# Patient Record
Sex: FEMALE | Race: WHITE | Employment: OTHER | URBAN - NONMETROPOLITAN AREA
[De-identification: names, ages, dates, MRNs, and addresses within clinical notes are randomized per-mention and may not be internally consistent; named-entity substitution may affect disease eponyms.]

---

## 2017-01-25 ENCOUNTER — COMMUNICATION - GICH (OUTPATIENT)
Dept: FAMILY MEDICINE | Facility: OTHER | Age: 82
End: 2017-01-25

## 2017-01-25 DIAGNOSIS — M19.90 OSTEOARTHRITIS: ICD-10-CM

## 2017-02-23 ENCOUNTER — OFFICE VISIT - GICH (OUTPATIENT)
Dept: FAMILY MEDICINE | Facility: OTHER | Age: 82
End: 2017-02-23

## 2017-02-23 ENCOUNTER — HISTORY (OUTPATIENT)
Dept: FAMILY MEDICINE | Facility: OTHER | Age: 82
End: 2017-02-23

## 2017-02-23 DIAGNOSIS — R05.9 COUGH: ICD-10-CM

## 2017-02-23 DIAGNOSIS — I10 ESSENTIAL (PRIMARY) HYPERTENSION: ICD-10-CM

## 2017-02-23 DIAGNOSIS — J06.9 ACUTE UPPER RESPIRATORY INFECTION: ICD-10-CM

## 2017-02-23 DIAGNOSIS — Z00.00 ENCOUNTER FOR GENERAL ADULT MEDICAL EXAMINATION WITHOUT ABNORMAL FINDINGS: ICD-10-CM

## 2017-02-23 DIAGNOSIS — R73.9 HYPERGLYCEMIA: ICD-10-CM

## 2017-02-23 DIAGNOSIS — M62.81 MUSCLE WEAKNESS (GENERALIZED): ICD-10-CM

## 2017-02-23 LAB
A/G RATIO - HISTORICAL: 1.1 (ref 1–2)
ABSOLUTE BASOPHILS - HISTORICAL: 0.1 THOU/CU MM
ABSOLUTE EOSINOPHILS - HISTORICAL: 0.3 THOU/CU MM
ABSOLUTE LYMPHOCYTES - HISTORICAL: 1.6 THOU/CU MM (ref 0.9–2.9)
ABSOLUTE MONOCYTES - HISTORICAL: 0.7 THOU/CU MM
ABSOLUTE NEUTROPHILS - HISTORICAL: 5.7 THOU/CU MM (ref 1.7–7)
ALBUMIN SERPL-MCNC: 3.8 G/DL (ref 3.5–5.7)
ALP SERPL-CCNC: 54 IU/L (ref 34–104)
ALT (SGPT) - HISTORICAL: 14 IU/L (ref 7–52)
ANION GAP - HISTORICAL: 8 (ref 5–18)
AST SERPL-CCNC: 16 IU/L (ref 13–39)
BASOPHILS # BLD AUTO: 1 %
BILIRUB SERPL-MCNC: 0.6 MG/DL (ref 0.3–1)
BUN SERPL-MCNC: 18 MG/DL (ref 7–25)
BUN/CREAT RATIO - HISTORICAL: 29
CALCIUM SERPL-MCNC: 10 MG/DL (ref 8.6–10.3)
CHLORIDE SERPLBLD-SCNC: 103 MMOL/L (ref 98–107)
CO2 SERPL-SCNC: 26 MMOL/L (ref 21–31)
CREAT SERPL-MCNC: 0.63 MG/DL (ref 0.7–1.3)
EOSINOPHIL NFR BLD AUTO: 3.6 %
ERYTHROCYTE [DISTWIDTH] IN BLOOD BY AUTOMATED COUNT: 14 % (ref 11.5–15.5)
GFR IF NOT AFRICAN AMERICAN - HISTORICAL: >60 ML/MIN/1.73M2
GLOBULIN - HISTORICAL: 3.5 G/DL (ref 2–3.7)
GLUCOSE SERPL-MCNC: 109 MG/DL (ref 70–105)
HCT VFR BLD AUTO: 45.1 % (ref 33–51)
HEMOGLOBIN: 14.4 G/DL (ref 12–16)
LYMPHOCYTES NFR BLD AUTO: 19.3 % (ref 20–44)
MCH RBC QN AUTO: 27.7 PG (ref 26–34)
MCHC RBC AUTO-ENTMCNC: 31.9 G/DL (ref 32–36)
MCV RBC AUTO: 87 FL (ref 80–100)
MONOCYTES NFR BLD AUTO: 8.8 %
NEUTROPHILS NFR BLD AUTO: 67.4 % (ref 42–72)
PLATELET # BLD AUTO: 308 THOU/CU MM (ref 140–440)
PMV BLD: 7.1 FL (ref 6.5–11)
POTASSIUM SERPL-SCNC: 4.3 MMOL/L (ref 3.5–5.1)
PROT SERPL-MCNC: 7.3 G/DL (ref 6.4–8.9)
RED BLOOD COUNT - HISTORICAL: 5.18 MIL/CU MM (ref 4–5.2)
SODIUM SERPL-SCNC: 137 MMOL/L (ref 133–143)
WHITE BLOOD COUNT - HISTORICAL: 8.4 THOU/CU MM (ref 4.5–11)

## 2017-03-06 ENCOUNTER — COMMUNICATION - GICH (OUTPATIENT)
Dept: FAMILY MEDICINE | Facility: OTHER | Age: 82
End: 2017-03-06

## 2017-03-06 DIAGNOSIS — I10 ESSENTIAL (PRIMARY) HYPERTENSION: ICD-10-CM

## 2017-03-30 ENCOUNTER — AMBULATORY - GICH (OUTPATIENT)
Dept: SCHEDULING | Facility: OTHER | Age: 82
End: 2017-03-30

## 2017-04-24 ENCOUNTER — COMMUNICATION - GICH (OUTPATIENT)
Dept: FAMILY MEDICINE | Facility: OTHER | Age: 82
End: 2017-04-24

## 2017-04-24 DIAGNOSIS — M19.90 OSTEOARTHRITIS: ICD-10-CM

## 2017-05-25 ENCOUNTER — OFFICE VISIT - GICH (OUTPATIENT)
Dept: FAMILY MEDICINE | Facility: OTHER | Age: 82
End: 2017-05-25

## 2017-05-25 ENCOUNTER — HISTORY (OUTPATIENT)
Dept: FAMILY MEDICINE | Facility: OTHER | Age: 82
End: 2017-05-25

## 2017-05-25 DIAGNOSIS — M79.601 PAIN OF RIGHT ARM: ICD-10-CM

## 2017-05-25 DIAGNOSIS — R53.83 OTHER FATIGUE: ICD-10-CM

## 2017-05-25 ASSESSMENT — PATIENT HEALTH QUESTIONNAIRE - PHQ9: SUM OF ALL RESPONSES TO PHQ QUESTIONS 1-9: 0

## 2017-06-27 ENCOUNTER — COMMUNICATION - GICH (OUTPATIENT)
Dept: FAMILY MEDICINE | Facility: OTHER | Age: 82
End: 2017-06-27

## 2017-06-27 DIAGNOSIS — I10 ESSENTIAL (PRIMARY) HYPERTENSION: ICD-10-CM

## 2017-06-28 ENCOUNTER — HISTORY (OUTPATIENT)
Dept: EMERGENCY MEDICINE | Facility: OTHER | Age: 82
End: 2017-06-28

## 2017-06-30 ENCOUNTER — HISTORY (OUTPATIENT)
Dept: MEDSURG UNIT | Facility: OTHER | Age: 82
End: 2017-06-30

## 2017-07-03 ENCOUNTER — HISTORY (OUTPATIENT)
Dept: MEDSURG UNIT | Facility: OTHER | Age: 82
End: 2017-07-03

## 2017-07-05 ENCOUNTER — AMBULATORY - GICH (OUTPATIENT)
Dept: SCHEDULING | Facility: OTHER | Age: 82
End: 2017-07-05

## 2017-07-06 ENCOUNTER — HISTORY (OUTPATIENT)
Dept: FAMILY MEDICINE | Facility: OTHER | Age: 82
End: 2017-07-06

## 2017-07-06 ENCOUNTER — OFFICE VISIT - GICH (OUTPATIENT)
Dept: FAMILY MEDICINE | Facility: OTHER | Age: 82
End: 2017-07-06

## 2017-07-06 ENCOUNTER — COMMUNICATION - GICH (OUTPATIENT)
Dept: FAMILY MEDICINE | Facility: OTHER | Age: 82
End: 2017-07-06

## 2017-07-06 DIAGNOSIS — R53.1 WEAKNESS: ICD-10-CM

## 2017-07-06 DIAGNOSIS — A41.51 SEPSIS DUE TO ESCHERICHIA COLI (E. COLI) (H): ICD-10-CM

## 2017-07-06 ASSESSMENT — PATIENT HEALTH QUESTIONNAIRE - PHQ9: SUM OF ALL RESPONSES TO PHQ QUESTIONS 1-9: 0

## 2017-07-07 ENCOUNTER — COMMUNICATION - GICH (OUTPATIENT)
Dept: FAMILY MEDICINE | Facility: OTHER | Age: 82
End: 2017-07-07

## 2017-07-19 ENCOUNTER — AMBULATORY - GICH (OUTPATIENT)
Dept: FAMILY MEDICINE | Facility: OTHER | Age: 82
End: 2017-07-19

## 2017-07-19 DIAGNOSIS — B96.20 UNSPECIFIED ESCHERICHIA COLI (E. COLI) AS THE CAUSE OF DISEASES CLASSIFIED ELSEWHERE: ICD-10-CM

## 2017-07-19 DIAGNOSIS — N39.0 URINARY TRACT INFECTION: ICD-10-CM

## 2017-07-27 ENCOUNTER — COMMUNICATION - GICH (OUTPATIENT)
Dept: FAMILY MEDICINE | Facility: OTHER | Age: 82
End: 2017-07-27

## 2017-09-01 ENCOUNTER — OFFICE VISIT - GICH (OUTPATIENT)
Dept: FAMILY MEDICINE | Facility: OTHER | Age: 82
End: 2017-09-01

## 2017-09-01 ENCOUNTER — HISTORY (OUTPATIENT)
Dept: FAMILY MEDICINE | Facility: OTHER | Age: 82
End: 2017-09-01

## 2017-09-01 DIAGNOSIS — N39.0 URINARY TRACT INFECTION: ICD-10-CM

## 2017-09-01 DIAGNOSIS — A41.51 SEPSIS DUE TO ESCHERICHIA COLI (E. COLI) (H): ICD-10-CM

## 2017-09-01 DIAGNOSIS — B96.20 UNSPECIFIED ESCHERICHIA COLI (E. COLI) AS THE CAUSE OF DISEASES CLASSIFIED ELSEWHERE: ICD-10-CM

## 2017-09-01 ASSESSMENT — PATIENT HEALTH QUESTIONNAIRE - PHQ9: SUM OF ALL RESPONSES TO PHQ QUESTIONS 1-9: 0

## 2017-09-26 ENCOUNTER — COMMUNICATION - GICH (OUTPATIENT)
Dept: FAMILY MEDICINE | Facility: OTHER | Age: 82
End: 2017-09-26

## 2017-09-26 DIAGNOSIS — I10 ESSENTIAL (PRIMARY) HYPERTENSION: ICD-10-CM

## 2017-10-13 ENCOUNTER — OFFICE VISIT - GICH (OUTPATIENT)
Dept: FAMILY MEDICINE | Facility: OTHER | Age: 82
End: 2017-10-13

## 2017-10-13 ENCOUNTER — COMMUNICATION - GICH (OUTPATIENT)
Dept: FAMILY MEDICINE | Facility: OTHER | Age: 82
End: 2017-10-13

## 2017-10-13 ENCOUNTER — HISTORY (OUTPATIENT)
Dept: FAMILY MEDICINE | Facility: OTHER | Age: 82
End: 2017-10-13

## 2017-10-13 DIAGNOSIS — N39.0 URINARY TRACT INFECTION: ICD-10-CM

## 2017-10-13 DIAGNOSIS — R10.2 PELVIC AND PERINEAL PAIN: ICD-10-CM

## 2017-10-13 DIAGNOSIS — B37.31 CANDIDIASIS OF VULVA AND VAGINA: ICD-10-CM

## 2017-10-13 LAB
BACTERIA URINE: ABNORMAL BACTERIA/HPF
BILIRUB UR QL: NEGATIVE
CLARITY, URINE: ABNORMAL CLARITY
COLOR UR: YELLOW COLOR
EPITHELIAL CELLS: ABNORMAL EPI/HPF
GLUCOSE URINE: NEGATIVE MG/DL
KETONES UR QL: NEGATIVE MG/DL
LEUKOCYTE ESTERASE URINE: ABNORMAL
NITRITE UR QL STRIP: POSITIVE
OCCULT BLOOD,URINE - HISTORICAL: ABNORMAL
PH UR: 7 [PH]
PROTEIN QUALITATIVE,URINE - HISTORICAL: NEGATIVE MG/DL
RBC - HISTORICAL: ABNORMAL /HPF
SP GR UR STRIP: <=1.005
UROBILINOGEN,QUALITATIVE - HISTORICAL: NORMAL EU/DL
WBC - HISTORICAL: ABNORMAL /HPF

## 2017-10-15 LAB
CULTURE - HISTORICAL: ABNORMAL
SUSCEPTIBILITY RESULT - HISTORICAL: ABNORMAL
SUSCEPTIBILITY RESULT - HISTORICAL: ABNORMAL

## 2017-10-16 ENCOUNTER — COMMUNICATION - GICH (OUTPATIENT)
Dept: FAMILY MEDICINE | Facility: OTHER | Age: 82
End: 2017-10-16

## 2017-10-16 DIAGNOSIS — N39.0 URINARY TRACT INFECTION: ICD-10-CM

## 2017-10-19 ENCOUNTER — COMMUNICATION - GICH (OUTPATIENT)
Dept: FAMILY MEDICINE | Facility: OTHER | Age: 82
End: 2017-10-19

## 2017-10-19 DIAGNOSIS — M19.90 OSTEOARTHRITIS: ICD-10-CM

## 2017-10-20 ENCOUNTER — HISTORY (OUTPATIENT)
Dept: EMERGENCY MEDICINE | Facility: OTHER | Age: 82
End: 2017-10-20

## 2017-10-21 ENCOUNTER — HISTORY (OUTPATIENT)
Dept: MEDSURG UNIT | Facility: OTHER | Age: 82
End: 2017-10-21

## 2017-10-22 ENCOUNTER — HISTORY (OUTPATIENT)
Dept: MEDSURG UNIT | Facility: OTHER | Age: 82
End: 2017-10-22

## 2017-10-22 ENCOUNTER — SURGERY (OUTPATIENT)
Dept: SURGERY | Facility: OTHER | Age: 82
End: 2017-10-22

## 2017-10-23 ENCOUNTER — TRANSFERRED RECORDS (OUTPATIENT)
Dept: HEALTH INFORMATION MANAGEMENT | Facility: CLINIC | Age: 82
End: 2017-10-23

## 2017-10-23 ENCOUNTER — MEDICAL CORRESPONDENCE (OUTPATIENT)
Facility: CLINIC | Age: 82
End: 2017-10-23
Payer: MEDICARE

## 2017-10-23 PROCEDURE — 93306 TTE W/DOPPLER COMPLETE: CPT | Mod: 26 | Performed by: INTERNAL MEDICINE

## 2017-10-24 ENCOUNTER — SURGERY (OUTPATIENT)
Dept: SURGERY | Facility: OTHER | Age: 82
End: 2017-10-24

## 2017-10-24 ENCOUNTER — AMBULATORY - GICH (OUTPATIENT)
Dept: SCHEDULING | Facility: OTHER | Age: 82
End: 2017-10-24

## 2017-10-26 ENCOUNTER — HISTORY (OUTPATIENT)
Dept: MEDSURG UNIT | Facility: OTHER | Age: 82
End: 2017-10-26

## 2017-10-30 ENCOUNTER — AMBULATORY - GICH (OUTPATIENT)
Dept: UROLOGY | Facility: OTHER | Age: 82
End: 2017-10-30

## 2017-10-30 ENCOUNTER — HISTORY (OUTPATIENT)
Dept: MEDSURG UNIT | Facility: OTHER | Age: 82
End: 2017-10-30

## 2017-10-30 DIAGNOSIS — N39.0 URINARY TRACT INFECTION: ICD-10-CM

## 2017-10-31 ENCOUNTER — HOSPITAL - GICH (OUTPATIENT)
Dept: LAB | Facility: OTHER | Age: 82
End: 2017-10-31

## 2017-10-31 ENCOUNTER — AMBULATORY - GICH (OUTPATIENT)
Dept: SCHEDULING | Facility: OTHER | Age: 82
End: 2017-10-31

## 2017-10-31 DIAGNOSIS — Z48.815 ENCOUNTER FOR SURGICAL AFTERCARE FOLLOWING SURGERY OF DIGESTIVE SYSTEM: ICD-10-CM

## 2017-10-31 LAB
ANION GAP - HISTORICAL: 8 (ref 5–18)
BUN SERPL-MCNC: 9 MG/DL (ref 7–25)
BUN/CREAT RATIO - HISTORICAL: 19
CALCIUM SERPL-MCNC: 9.6 MG/DL (ref 8.6–10.3)
CHLORIDE SERPLBLD-SCNC: 99 MMOL/L (ref 98–107)
CO2 SERPL-SCNC: 24 MMOL/L (ref 21–31)
CREAT SERPL-MCNC: 0.47 MG/DL (ref 0.7–1.3)
GFR IF NOT AFRICAN AMERICAN - HISTORICAL: >60 ML/MIN/1.73M2
GLUCOSE SERPL-MCNC: 120 MG/DL (ref 70–105)
POTASSIUM SERPL-SCNC: 3.9 MMOL/L (ref 3.5–5.1)
SODIUM SERPL-SCNC: 131 MMOL/L (ref 133–143)

## 2017-11-07 ENCOUNTER — HOSPITAL - GICH (OUTPATIENT)
Dept: LAB | Facility: OTHER | Age: 82
End: 2017-11-07

## 2017-11-07 DIAGNOSIS — K63.2 FISTULA OF INTESTINE: ICD-10-CM

## 2017-11-07 LAB
ANION GAP - HISTORICAL: 12 (ref 5–18)
BUN SERPL-MCNC: 18 MG/DL (ref 7–25)
BUN/CREAT RATIO - HISTORICAL: 35
CALCIUM SERPL-MCNC: 9.7 MG/DL (ref 8.6–10.3)
CHLORIDE SERPLBLD-SCNC: 103 MMOL/L (ref 98–107)
CO2 SERPL-SCNC: 25 MMOL/L (ref 21–31)
CREAT SERPL-MCNC: 0.51 MG/DL (ref 0.7–1.3)
GFR IF NOT AFRICAN AMERICAN - HISTORICAL: >60 ML/MIN/1.73M2
GLUCOSE SERPL-MCNC: 112 MG/DL (ref 70–105)
POTASSIUM SERPL-SCNC: 4.1 MMOL/L (ref 3.5–5.1)
SODIUM SERPL-SCNC: 140 MMOL/L (ref 133–143)

## 2017-11-08 ENCOUNTER — HISTORY (OUTPATIENT)
Dept: FAMILY MEDICINE | Facility: OTHER | Age: 82
End: 2017-11-08

## 2017-11-08 ENCOUNTER — COMMUNICATION - GICH (OUTPATIENT)
Dept: UROLOGY | Facility: OTHER | Age: 82
End: 2017-11-08

## 2017-11-08 ENCOUNTER — OFFICE VISIT - GICH (OUTPATIENT)
Dept: FAMILY MEDICINE | Facility: OTHER | Age: 82
End: 2017-11-08

## 2017-11-08 DIAGNOSIS — N32.1 VESICOINTESTINAL FISTULA: ICD-10-CM

## 2017-11-13 ENCOUNTER — HISTORY (OUTPATIENT)
Dept: SURGERY | Facility: OTHER | Age: 82
End: 2017-11-13

## 2017-11-13 ENCOUNTER — HISTORY (OUTPATIENT)
Dept: UROLOGY | Facility: OTHER | Age: 82
End: 2017-11-13

## 2017-11-13 ENCOUNTER — OFFICE VISIT - GICH (OUTPATIENT)
Dept: SURGERY | Facility: OTHER | Age: 82
End: 2017-11-13

## 2017-11-13 ENCOUNTER — AMBULATORY - GICH (OUTPATIENT)
Dept: UROLOGY | Facility: OTHER | Age: 82
End: 2017-11-13

## 2017-11-13 ENCOUNTER — HOSPITAL ENCOUNTER (OUTPATIENT)
Dept: RADIOLOGY | Facility: OTHER | Age: 82
End: 2017-11-13

## 2017-11-13 DIAGNOSIS — N32.1 VESICOINTESTINAL FISTULA: ICD-10-CM

## 2017-11-13 DIAGNOSIS — Z90.49 ACQUIRED ABSENCE OF OTHER SPECIFIED PARTS OF DIGESTIVE TRACT: ICD-10-CM

## 2017-11-14 ENCOUNTER — HISTORY (OUTPATIENT)
Dept: SURGERY | Facility: OTHER | Age: 82
End: 2017-11-14

## 2017-11-27 ENCOUNTER — HISTORY (OUTPATIENT)
Dept: UROLOGY | Facility: OTHER | Age: 82
End: 2017-11-27

## 2017-11-27 ENCOUNTER — AMBULATORY - GICH (OUTPATIENT)
Dept: UROLOGY | Facility: OTHER | Age: 82
End: 2017-11-27

## 2017-11-27 DIAGNOSIS — N32.1 VESICOINTESTINAL FISTULA: ICD-10-CM

## 2017-11-27 DIAGNOSIS — Z90.49 ACQUIRED ABSENCE OF OTHER SPECIFIED PARTS OF DIGESTIVE TRACT: ICD-10-CM

## 2017-12-06 ENCOUNTER — COMMUNICATION - GICH (OUTPATIENT)
Dept: FAMILY MEDICINE | Facility: OTHER | Age: 82
End: 2017-12-06

## 2017-12-06 ENCOUNTER — AMBULATORY - GICH (OUTPATIENT)
Dept: SCHEDULING | Facility: OTHER | Age: 82
End: 2017-12-06

## 2017-12-06 DIAGNOSIS — K21.9 GASTRO-ESOPHAGEAL REFLUX DISEASE WITHOUT ESOPHAGITIS: ICD-10-CM

## 2017-12-06 DIAGNOSIS — M19.90 OSTEOARTHRITIS: ICD-10-CM

## 2017-12-08 ENCOUNTER — OFFICE VISIT - GICH (OUTPATIENT)
Dept: FAMILY MEDICINE | Facility: OTHER | Age: 82
End: 2017-12-08

## 2017-12-08 ENCOUNTER — HISTORY (OUTPATIENT)
Dept: FAMILY MEDICINE | Facility: OTHER | Age: 82
End: 2017-12-08

## 2017-12-08 DIAGNOSIS — Z90.49 ACQUIRED ABSENCE OF OTHER SPECIFIED PARTS OF DIGESTIVE TRACT: ICD-10-CM

## 2017-12-08 ASSESSMENT — PATIENT HEALTH QUESTIONNAIRE - PHQ9: SUM OF ALL RESPONSES TO PHQ QUESTIONS 1-9: 0

## 2017-12-12 ENCOUNTER — COMMUNICATION - GICH (OUTPATIENT)
Dept: FAMILY MEDICINE | Facility: OTHER | Age: 82
End: 2017-12-12

## 2017-12-12 DIAGNOSIS — Z98.890 OTHER SPECIFIED POSTPROCEDURAL STATES: ICD-10-CM

## 2017-12-12 DIAGNOSIS — I10 ESSENTIAL (PRIMARY) HYPERTENSION: ICD-10-CM

## 2017-12-20 ENCOUNTER — COMMUNICATION - GICH (OUTPATIENT)
Dept: FAMILY MEDICINE | Facility: OTHER | Age: 82
End: 2017-12-20

## 2017-12-21 ENCOUNTER — COMMUNICATION - GICH (OUTPATIENT)
Dept: FAMILY MEDICINE | Facility: OTHER | Age: 82
End: 2017-12-21

## 2017-12-26 ENCOUNTER — HISTORY (OUTPATIENT)
Dept: FAMILY MEDICINE | Facility: OTHER | Age: 82
End: 2017-12-26

## 2017-12-26 ENCOUNTER — OFFICE VISIT - GICH (OUTPATIENT)
Dept: FAMILY MEDICINE | Facility: OTHER | Age: 82
End: 2017-12-26

## 2017-12-26 DIAGNOSIS — K21.9 GASTRO-ESOPHAGEAL REFLUX DISEASE WITHOUT ESOPHAGITIS: ICD-10-CM

## 2017-12-27 NOTE — PROGRESS NOTES
Patient Information     Patient Name MRN Sex Karey De La Rosa [id number] Female 10/17/1924      Progress Notes by Byron Huerta MD at 2017 12:45 PM     Author:  Byron Huerta MD Service:  (none) Author Type:  Physician     Filed:  2017  4:57 PM Encounter Date:  2017 Status:  Signed     :  Byron Huerta MD (Physician)            SUBJECTIVE:    Karey Paulson is a 89 y.o. female who presents for follow-up hospitalization    HPI Comments: Patient was recently hospitalized for UTI. Possible sepsis. She arrives here for a second follow-up. She continues to be weak. Especially in the legs and arms. She is seeing a therapist a couple times a week. Continues to also have a dry cough that is more chronic in nature. She has had x-rays in the past that have been unremarkable. Patient reports she is getting exercise and walking fairly frequently.      Allergies      Allergen   Reactions     Ciprofloxacin  *Unknown     Lipitor [Atorvastatin]  Nausea And Vomiting and Myalgia     Myalgia      Lisinopril  Cough     Simvastatin  Myalgia     Achey muscles.     ,   Family History      Problem  Relation Age of Onset     Cancer-breast No Family History    ,   Current Outpatient Prescriptions on File Prior to Visit       Medication  Sig Dispense Refill     amLODIPine (NORVASC) 10 mg tablet Take 1 tablet by mouth once daily. 90 tablet 3     aspirin 81 mg tablet Take 1 tablet by mouth once daily with a meal.       brimonidine (ALPHAGAN P) 0.1 % ophthalmic solution Place 1 Drop into right eye 3 times daily.       cholecalciferol (VITAMIN D) 1,000 unit tablet Take 1 tablet by mouth once daily. 90 tablet 2     codeine-guaiFENesin (ROBITUSSIN AC)  mg/5 mL liquid Take 5 mL by mouth every 4 hours if needed for Cough. Max dose 60 mL per 24 hrs. 120 mL 3     DME Side bed rail 1 unit 0     dorzolamide-timolol (COSOPT) 2-0.5 % ophthalmic solution Place 1 Drop into right eye 3 times daily.       latanoprost  (XALATAN) 0.005 % ophthalmic solution Place 1 Drop into right eye at bedtime.       prednisoLONE acetate 1% ophthalmic (ECONOPRED PLUS, PRED FORTE, OMNIPRED) suspension Place 1 Drop into right eye once daily.       ranitidine (ZANTAC 75) 75 mg tablet Take 1-2 tablets by mouth once daily. 90 tablet 4     timolol (TIMOPTIC -XE) 0.5 % ophthalmic gel-forming Place 1 Drop into left eye once daily.       valsartan (DIOVAN) 80 mg tablet Take 1 tablet by mouth once daily. 90 tablet 3     No current facility-administered medications on file prior to visit.    ,   Social History     Substance Use Topics       Smoking status: Never Smoker     Smokeless tobacco: Never Used     Alcohol use No    and   Social History     Substance Use Topics       Smoking status: Never Smoker     Smokeless tobacco: Never Used     Alcohol use No       REVIEW OF SYSTEMS:  ROS    OBJECTIVE:  /80  Pulse 68  Wt 71.6 kg (157 lb 12.8 oz)  BMI 25.47 kg/m2    EXAM:   Physical Exam   Constitutional: She is well-developed, well-nourished, and in no distress.   HENT:   Head: Normocephalic.   Cardiovascular: Normal rate, regular rhythm and normal heart sounds.    No murmur heard.  Pulmonary/Chest: Effort normal and breath sounds normal. No respiratory distress.       ASSESSMENT/PLAN:    ICD-10-CM    1. E. coli UTI (urinary tract infection) N39.0      B96.20    2. Sepsis due to Escherichia coli (HC) A41.51         Plan:  Patient appears to be making a slow come back. Continue with her activity. Follow-up as needed.

## 2017-12-27 NOTE — PROGRESS NOTES
Patient Information     Patient Name MRN Sex Karey De La Rosa [id number] Female 10/17/1924      Progress Notes by Trent Gill MD at 2017  3:30 PM     Author:  Trent Gill MD Service:  (none) Author Type:  Physician     Filed:  2017  1:57 PM Encounter Date:  2017 Status:  Signed     :  Trent Gill MD (Physician)            Patient presents for post surgical visit after laparoscopic assisted sigmoid colectomy, colovesical fistula takedown, and bladder repair 2 weeks ago. Patient has done well. No problems with incisions. Feels weak.  Unsteady.  Doing rehab.     /74  Pulse 64  Resp 18  Breastfeeding? No    General: NAD, pleasant and cooperative with exam and interview.  Abdomen: healing incisions. No sign of infection. Mild pain with palpation.  Psychiatry: awake, alert and oriented. Appropriate affect.    Assessment/Plan:  Discussed surgery and pathology results. Patient can return to normal activities. Patient will call with questions or concerns.  CT shows no bladder leak.     - bladder catheter removed per Dr Arambula  - ? Left ureteral stent removal at some point.   - Follow up PRN with general surgery.     Trent Gill MD ....................  2017   1:53 PM

## 2017-12-27 NOTE — PROGRESS NOTES
Patient Information     Patient Name MRN Sex Karey De La Rosa [id number] Female 10/17/1924      Progress Notes by James Arambula MD at 2017  9:30 AM     Author:  James Arambula MD Service:  (none) Author Type:  Physician     Filed:  2017  9:57 AM Encounter Date:  2017 Status:  Signed     :  James Arambula MD (Physician)            Preoperative diagnosis  colovesical fistula    Postoperative diagnosis  colovesical fistula    Procedure  Flexible cystourethroscopy with stent removal    Surgeon  James Arambula MD    Anesthesia  2% lidocaine jelly intraurethrally    Complications  None    Indications  89 y.o. female who is status post colovesical fistula repair who presents for stent removal.    Procedure  The patient was given one dose of antibiotics. The patient was placed in supine position and was prepped and draped in sterile fashion.  2% lidocaine jelly was bluntly injected per urethra without difficulty. I passed the 14 French flexible cystoscope through the urethra and into the bladder.  With the aid of a stent grasper I grasped and removed the stent in its entirety.  The patient tolerated the procedure well.    Plan  F/u as needed

## 2017-12-27 NOTE — PROGRESS NOTES
Patient Information     Patient Name MRN Sex Karey De La Rosa [id number] Female 10/17/1924      Progress Notes by Alejandra Madison MD at 2017 10:00 AM     Author:  Alejandra Madison MD Service:  (none) Author Type:  Physician     Filed:  2017 10:41 AM Encounter Date:  2017 Status:  Signed     :  Alejandra Madison MD (Physician)            SUBJECTIVE:    Karey Paulson is a 89 y.o. female who presents for hospital follow-up visit and was recently hospitalized here with a high-grade sigmoid stricture and a colovesical fistula that was repaired by Dr. Arambula and Jairo together. She was postoperative a follow-up visit this week on 2017 with Dr. Arambula and a CT cystogram but the family was not notified be needed to transport her and they missed that appointment because they did not know about it. This will need to be rescheduled. Both patient and I are confused as to why she is here today since discharge planning notes that she should be seen on the . She is currently residing at [facility] and had previously been at [facility] and she is hoping to return there with full recovery. She is accompanied today by her granddaughter.    Notes from staff at [facility] indicate that there is somewhat concerned about sediment in her catheter tube and I did encourage her to stay well hydrated.  HPI    Allergies      Allergen   Reactions     Ciprofloxacin  *Unknown     Lipitor [Atorvastatin]  Nausea And Vomiting and Myalgia     Myalgia      Lisinopril  Cough     Simvastatin  Myalgia     Achey muscles.     ,   Current Outpatient Prescriptions:      acetaminophen (TYLENOL EXTRA STRGTH) 500 mg tablet, Take 1 tablet by mouth 4 times daily. Max acetaminophen dose: 4000mg in 24 hrs., Disp: , Rfl: 0     amLODIPine (NORVASC) 10 mg tablet, TAKE ONE TABLET BY MOUTH EVERY DAY, Disp: 90 tablet, Rfl: 1     aspirin 81 mg tablet, Take 1 tablet by mouth once daily with a meal., Disp: , Rfl:       brimonidine (ALPHAGAN P) 0.1 % ophthalmic solution, Place 1 Drop into right eye 3 times daily., Disp: , Rfl:      cholecalciferol (VITAMIN D) 1,000 unit tablet, Take 1 tablet by mouth once daily., Disp: 90 tablet, Rfl: 2     DME, Side bed rail, Disp: 1 unit, Rfl: 0     dorzolamide-timolol (COSOPT) 2-0.5 % ophthalmic solution, Place 1 Drop into right eye 3 times daily., Disp: , Rfl:      latanoprost (XALATAN) 0.005 % ophthalmic solution, Place 1 Drop into right eye at bedtime., Disp: , Rfl:      omeprazole (PRILOSEC) 20 mg Delayed-Release capsule, Take 1 capsule by mouth once daily before a meal., Disp: 10 capsule, Rfl: 0     oxyCODONE (ROXICODONE) 5 mg immediate release tablet, Take 0.5 tablets by mouth every 4 hours if needed  for Pain, Disp: 30 tablet, Rfl: 0     prednisoLONE acetate 1% ophthalmic (ECONOPRED PLUS, PRED FORTE, OMNIPRED) suspension, Place 1 Drop into right eye once daily., Disp: , Rfl:      ranitidine (ZANTAC) 150 mg tablet, TAKE ONE-HALF TO ONE TAB (75-150MG) BY MOUTH ONCE DAILY IF NEEDED, Disp: , Rfl: 99     ranitidine (ZANTAC) 75 mg tablet, Take  mg by mouth once daily if needed., Disp: , Rfl:      timolol (TIMOPTIC -XE) 0.5 % ophthalmic gel-forming, Place 1 Drop into left eye once daily., Disp: , Rfl:      valsartan (DIOVAN) 80 mg tablet, Take 1 tablet by mouth once daily., Disp: 90 tablet, Rfl: 3  Medications have been reviewed by me and are current to the best of my knowledge and ability. and   Patient Active Problem List       Diagnosis  Date Noted     Nursing home resident  11/08/2017     [facility]-10/3/2017        Postoperative state  10/28/2017     Syncope  10/26/2017     Stricture of sigmoid colon  10/22/2017     S/p flex sig 10/22/2017          Diverticulosis of large intestine  10/22/2017     S/p fle sig, 10/22/2017        [place]-vesical fistula  10/21/2017               Recurrent UTI  10/21/2017     2 in 2017, 10/13/2017 and 6/28/2017          Pancreatic cyst  10/20/2017      Cystic nodules, multiple; s/p CT Abd Pelv        Gastroesophageal reflux disease without esophagitis  12/31/2015     ACP (advance care planning)  12/05/2013     Back pain  02/24/2013     ACTINIC KERATOSIS  04/18/2012     DEGENERATIVE JOINT DISEASE  11/08/2011     Essential hypertension       Hyperlipidemia  01/29/2009     Overview:   IMO Update 10/11        Borderline glaucoma with ocular hypertension  06/26/2007       REVIEW OF SYSTEMS:  ROS    OBJECTIVE:  /80  Pulse 80  Temp 97.8  F (36.6  C) (Oral)  Wt 65.4 kg (144 lb 3.2 oz)  BMI 23.27 kg/m2    EXAM:   Physical Exam   Constitutional: She is oriented to person, place, and time and well-developed, well-nourished, and in no distress. No distress.   Cardiovascular: Normal rate and regular rhythm.    Pulmonary/Chest: Effort normal and breath sounds normal.   Genitourinary:   Genitourinary Comments: Catheter in place. Urine is yellow with some sediment in the tube. No evidence of hematuria in the catheter bag.   Neurological: She is alert and oriented to person, place, and time.   Psychiatric: Mood and affect normal.   Nursing note and vitals reviewed.    Results for orders placed or performed in visit on 11/07/17      BASIC METABOLIC PANEL      Result  Value Ref Range    SODIUM 140 133 - 143 mmol/L    POTASSIUM 4.1 3.5 - 5.1 mmol/L    CHLORIDE 103 98 - 107 mmol/L    CO2,TOTAL 25 21 - 31 mmol/L    ANION GAP 12 5 - 18                    GLUCOSE 112 (H) 70 - 105 mg/dL    CALCIUM 9.7 8.6 - 10.3 mg/dL    BUN 18 7 - 25 mg/dL    CREATININE 0.51 (L) 0.70 - 1.30 mg/dL    BUN/CREAT RATIO           35                    GFR if African American >60 >60 ml/min/1.73m2    GFR if not African American >60 >60 ml/min/1.73m2     I have personally reviewed the labs listed above.      ASSESSMENT/PLAN:    ICD-10-CM    1. [place]-vesical fistula N32.1    2. Nursing home resident Z59.3         Plan:   Encourage water intake to maintain hydration.  Follow-up visits are rearranged  so that she can see Dr. Arabmula early next week. She has an appointment with Dr. Gill that afternoon.  Subsequent primary care will be Dr. Huerta once he returns.  Granddaughter plans to bring her to subsequent appointments and is made aware of when these are to occur.  Alejandra Madison MD  10:40 AM 11/8/2017   I spent approximately 25 minutes with the patient (exclusive of separately billed services/procedures), with greater than 50% spent in Counseling, Prognosis, Risks and benefits of management or follow-up, Importance of compliance with chosen management options, Instructions of management (treatment) and/or follow-up, Risk factor reduction and Patient education and Coordinating Care, Establishing and/or reviewing the patient's medical record. Reviewed operative reports and recent hospital stay as well as records from the nursing home.      Portions of this dictation were created using the Dragon Nuance voice recognition system. Proofreading was completed but there may be errors in text.

## 2017-12-28 NOTE — TELEPHONE ENCOUNTER
Patient Information     Patient Name MRN Karey White [id number] Female 10/17/1924      Telephone Encounter by More Hernandez, RN, BSN at 2017  3:54 PM     Author:  More Hernandez, RN, BSN Service:  (none) Author Type:  NURS- Registered Nurse     Filed:  2017  3:58 PM Encounter Date:  2017 Status:  Signed     :  More Hernandez RN, BSN (NURS- Registered Nurse)              S:    Pt was admitted to [facility] for PT/OT. Will have only one skilled nurse visit.     B:    Pt completes own medication administration. On admission a medication reconciliation was completed with an interaction report. The pt is adherent to the medication list noted on EPIC.     A:     Interaction report:  Aspirin & Dorzolamide HCl-Timolol Mal  Aspirin & PrednisoLONE Acetate  Please evaluate this information and indicate below whether or not changes are required.     R:    A copy of the patient's drug interaction/contraindications report is available upon request.     Thank you for your time. Please call with questions or concerns.   Breanna Hernandez, HAMLET, BSN ....................  2017   3:57 PM

## 2017-12-28 NOTE — PROGRESS NOTES
Patient Information     Patient Name MRN Sex Karey De La Rosa [id number] Female 10/17/1924      Progress Notes by Naomie Rodriguez at 2017  8:42 AM     Author:  Naomie Rodriguez Service:  (none) Author Type:  Other Clinical Staff     Filed:  2017  8:42 AM Date of Service:  2017  8:42 AM Status:  Signed     :  Naomie Rodriguez (Other Clinical Staff)            Falls Risk Criteria:    Age 65 and older or under age 4        Sensory deficits    Poor vision    Use of ambulatory aides    Impaired judgment    Unable to walk independently    Meets High Risk criteria for falls:  Yes             1.  Do you have dizziness or vertigo?    yes                    2.  Do you need help standing or walking?   yes                 3.  Have you fallen within the last 6 months?    yes           4.  Has the patient been fasting?      yes       If any risks are marked Yes, the following interventions are utilized:    Do not leave patient unattended     Assist patient in the dressing room and bathroom    Have ambulatory aides available throughout procedure    Involve patient s family if available

## 2017-12-28 NOTE — TELEPHONE ENCOUNTER
Patient Information     Patient Name MRN Karey White [id number] Female 10/17/1924      Telephone Encounter by Naomie Biggs at 10/16/2017 10:09 AM     Author:  Naomie Biggs Service:  (none) Author Type:  (none)     Filed:  10/16/2017 10:10 AM Encounter Date:  10/16/2017 Status:  Signed     :  Naomie Biggs            Patient notified of results and recommendations.  Naomie Biggs LPN ....................  10/16/2017   10:10 AM

## 2017-12-28 NOTE — TELEPHONE ENCOUNTER
Patient Information     Patient Name MRN Karey White [id number] Female 10/17/1924      Telephone Encounter by Keyla Mc at 2017  8:24 AM     Author:  Keyla Mc Service:  (none) Author Type:  NURS- Student Nurse     Filed:  2017  8:26 AM Encounter Date:  2017 Status:  Signed     :  Keyla Mc (NURS- Student Nurse)            Redundant Refill Request  Refused;    Medication:amLODIPine (NORVASC) 10 mg tablet    Qty:90 tablet   Ref:3  Start:2016       Unable to complete prescription refill per RN Medication Refill Policy.................... Keyla Mc RN ....................  2017   8:26 AM

## 2017-12-28 NOTE — TELEPHONE ENCOUNTER
"Patient Information     Patient Name MRN Karey White [id number] Female 10/17/1924      Telephone Encounter by Nemo Barrios at 2017 10:10 AM     Author:  Nemo Barrios Service:  (none) Author Type:  (none)     Filed:  2017 10:14 AM Encounter Date:  2017 Status:  Signed     :  Nemo Barrios            Request for Home Care Physical Therapy orders as follows:      Continuation frequency =  2 x week x __2__ weeks              Effective date = 17    Interventions include:    Therapeutic Exercise  Gait Training  Gait/Balance/Dysfunction  Home Exercise Program  Home Safety Assessment      Request for Home Care Occupational Therapy orders as follows:      Continuation frequency =  2 x week x __2__ weeks               Effective date = 17    Interventions include:    Therapeutic Exercise  Caregiver training  ADL training  Adaptive equipment  Home safety assessment                                     Acknowledging this order with an \"OK\" will suffice. Thank you for your time.  Please call if you have any questions or concerns.    For therapist: Suzan Weber, PT                                       "

## 2017-12-28 NOTE — TELEPHONE ENCOUNTER
Patient Information     Patient Name MRN Karey White [id number] Female 10/17/1924      Telephone Encounter by Byron Huerta MD at 2017 10:46 AM     Author:  Byron Huerta MD Service:  (none) Author Type:  Physician     Filed:  2017 10:46 AM Encounter Date:  2017 Status:  Signed     :  Byron Huerta MD (Physician)            ok

## 2017-12-28 NOTE — PROGRESS NOTES
Patient Information     Patient Name MRN Karey White [id number] Female 10/17/1924      Progress Notes by Byron Huerta MD at 2017  9:50 AM     Author:  Byron Huerta MD Service:  (none) Author Type:  Physician     Filed:  2017  9:59 AM Encounter Date:  2017 Status:  Signed     :  Byron Huerta MD (Physician)            Received home health certification plan of care for  to . Patient does have a history of sepsis recently hospitalized. Patient is being seen to monitor medications. Gait and balance. Pain control. A long-term safety issues. Continue with home care certification.

## 2017-12-28 NOTE — TELEPHONE ENCOUNTER
Patient Information     Patient Name MRN Karey White [id number] Female 10/17/1924      Telephone Encounter by Ana Williamson PA-C at 10/16/2017  9:55 AM     Author:  Ana Williamson PA-C Service:  (none) Author Type:  PHYS- Physician Assistant     Filed:  10/16/2017  9:56 AM Encounter Date:  10/16/2017 Status:  Signed     :  Ana Williamson PA-C (PHYS- Physician Assistant)            Your urine grew out bacteria that is resistant to the antibiotic that we treated you with.  I am switching your antibiotic to bactrim.  Please discontinue the old antibiotic (keflex) and start the new one.  Finish the whole course of the new antibiotic.  Please return after you are done with the antibiotic if you are still having urinary concerns, fevers, chills.  Ana Williamson PA-C ....................  10/16/2017   9:55 AM

## 2017-12-28 NOTE — TELEPHONE ENCOUNTER
"Patient Information     Patient Name MRN Karey White [id number] Female 10/17/1924      Telephone Encounter by Nemo Barrios at 2017  9:22 AM     Author:  Nemo Barrios Service:  (none) Author Type:  (none)     Filed:  2017  9:24 AM Encounter Date:  2017 Status:  Signed     :  Nemo Barrios            Request for Home Care Home Health Aide orders as follows:    Frequency =  ___2____ X week X ___8_____  weeks       Effective date = 7/10/17    Interventions include:      Assistance with bathing and activities of daily       Acknowledging this request with an \"OK\" will suffice, thank you for your time.    Breanna Hernandez RN        "

## 2017-12-28 NOTE — PATIENT INSTRUCTIONS
Patient Information     Patient Name MRN Karey White [id number] Female 10/17/1924      Patient Instructions by Lola Ibarra RN at 2017  9:30 AM     Author:  Lola Ibarra RN Service:  (none) Author Type:  NURS- Registered Nurse     Filed:  2017  9:41 AM Encounter Date:  2017 Status:  Signed     :  Lola Ibarra RN (NURS- Registered Nurse)            Home Care after Cystoscopy with Stent Removal  Follow these guidelines for your care after your procedure.    Activity  No limitations    Bathing or showering  No limitations    Symptoms  You may notice some burning with urination but this usually resolves after 1-2 days.  You may also notice small amounts of blood in your urine.  Please increase water intake for the next few days to help with these symptoms.    Contacts  General Questions: [phone]  Appointments:  [phone]  Emergencies:  [phone]    When to call the clinic  If you develop any of the following symptoms please call the clinic immediately.  If the clinic is closed please be seen at an urgent care clinic or the Emergency Department.  - Burning with urination that worsens after 2 days  - Unable to urinate causing severe pelvic pain  - Fevers of greater than 101 degrees F  - Flank pain that is not responding to pain medication    Follow up  Please follow up as discussed at the appointment.

## 2017-12-28 NOTE — PATIENT INSTRUCTIONS
Patient Information     Patient Name MRKarey Adams [id number] Female 10/17/1924      Patient Instructions by Ana Williamson PA-C at 10/13/2017 11:15 AM     Author:  Ana Williamson PA-C Service:  (none) Author Type:  PHYS- Physician Assistant     Filed:  10/13/2017 11:48 AM Encounter Date:  10/13/2017 Status:  Signed     :  Ana Williamson PA-C (SOUTH- Physician Assistant)            Completed urine check to rule out bladder infection concerns.     Yeast infection - Patient given fluconazole 150mg #2.  Take 1 tab of fluconazole now.  Repeat in 1 week. If you are still having persistent symptoms after 2 weeks, return for further testing.     If you have a vaginal yeast infection, follow these guidelines:   Follow the full treatment prescribed by your healthcare provider.   After urinating, wipe gently to avoid irritation.   Use unscented soaps.   Avoid using douches and other chemicals, such as bubble bath or hygiene spray, in the vaginal area unless recommended by your healthcare provider.   Take a shower instead of a bath. Pat the genital area dry.   Wear cotton underwear to allow ventilation and to keep the area drier.   Avoid sexual intercourse until the infection is gone.     Here are some things you can do to help prevent yeast infection:   Keep naturally moist areas of the body as cool and dry as possible.   Avoid wearing a wet bathing suit or damp clothing for long periods of time.   Avoid using douches, perfumed soaps, feminine sprays, feminine hygiene deodorants, or scented pads or tampons, and don't take bubble baths.   Wear underwear that is all cotton or has a cotton crotch. Pantyhose should also have a cotton crotch. Cotton allows better air circulation than nylon. Change underwear and pantyhose every day.   Avoid wearing tight pantyhose or tight pants.   Eat yogurt every day.   Avoid frequent or prolonged use of oral antibiotics, if possible. Understand that antibiotics are  often not needed and their use should be decided by your healthcare provider. When you are given antibiotics by your provider, take them as directed. Do not share antibiotics with others, and do not save antibiotics for another time.   Lose weight if you are overweight. (Yeast tends to grow in the skin folds of overweight people - especially the breasts, belly, and groin.)   If you have diabetes, keep your blood sugar under control.

## 2017-12-28 NOTE — TELEPHONE ENCOUNTER
Patient Information     Patient Name MRN Karey White [id number] Female 10/17/1924      Telephone Encounter by Nicol Faustin at 2017  2:48 PM     Author:  Nicol Faustin Service:  (none) Author Type:  (none)     Filed:  2017  2:55 PM Encounter Date:  2017 Status:  Signed     :  Nicol Faustin             She gave pt name and date of birth.Informed her what the appointment is all about.  Nicol Faustin LPN  2017  2:55 PM

## 2017-12-28 NOTE — PROGRESS NOTES
Patient Information     Patient Name MRN Sex Karey De La Rosa [id number] Female 10/17/1924      Progress Notes by Ana Williamson PA-C at 10/13/2017 11:15 AM     Author:  Ana Williamson PA-C Service:  (none) Author Type:  PHYS- Physician Assistant     Filed:  10/16/2017 12:08 PM Encounter Date:  10/13/2017 Status:  Signed     :  Ana Williamson PA-C (PHYS- Physician Assistant)            Nursing Notes:   Naomie Biggs  10/13/2017 11:32 AM  Signed  Patient presents to the clinic for a sharp pain across lower abdomen.  Patient also states that she has had some vaginal itching.   Naomie Biggs LPN........................10/13/2017  11:24 AM      HPI:    Karey Paulson is a 89 y.o. female who presents for sharp pain across lower abdomen.  Patient also states that she has had some vaginal itching. Pain on 10/10 for 3 hours.    Then got itchy scratchy feeling in crotch. No further belly pain.   Had dysuria yesterday but this is better today. No new back pain, fevers, chills, GI symptoms. Eating and drinking nl. Some frequency.       Past Medical History:     Diagnosis  Date     Acute MI 2010     DEGENERATIVE JOINT DISEASE 2011     DEPENDENT EDEMA 2011     Epigastric abdominal pain 2015     Essential hypertension [I10]      Gastroesophageal reflux disease without esophagitis 2015     Hx of breast cancer     Questionable 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      Hx of pregnancy           Sepsis (HC) 2017    E coli bacteremia; E coli UTI (resistant to Cipro)        Past Surgical History:      Procedure  Laterality Date     ANGIOPLASTY  2010    Angioplasty with 3 bare-metal stents RCA       APPENDECTOMY      Appendectomy       CATARACT REMOVAL      bilat       CHOLECYSTECTOMY      Cholecystectomy       MASTECTOMY      R mastectomy       TOTAL ABDOMINAL HYSTERECTOMY      RUEL, ovaries remaining         Social History     Substance Use Topics       Smoking status: Never Smoker      Smokeless tobacco: Never Used     Alcohol use No       Current Outpatient Prescriptions       Medication  Sig Dispense Refill     amLODIPine (NORVASC) 10 mg tablet TAKE ONE TABLET BY MOUTH EVERY DAY 90 tablet 1     aspirin 81 mg tablet Take 1 tablet by mouth once daily with a meal.       brimonidine (ALPHAGAN P) 0.1 % ophthalmic solution Place 1 Drop into right eye 3 times daily.       cholecalciferol (VITAMIN D) 1,000 unit tablet Take 1 tablet by mouth once daily. 90 tablet 2     codeine-guaiFENesin (ROBITUSSIN AC)  mg/5 mL liquid Take 5 mL by mouth every 4 hours if needed for Cough. Max dose 60 mL per 24 hrs. 120 mL 3     DME Side bed rail 1 unit 0     dorzolamide-timolol (COSOPT) 2-0.5 % ophthalmic solution Place 1 Drop into right eye 3 times daily.       latanoprost (XALATAN) 0.005 % ophthalmic solution Place 1 Drop into right eye at bedtime.       prednisoLONE acetate 1% ophthalmic (ECONOPRED PLUS, PRED FORTE, OMNIPRED) suspension Place 1 Drop into right eye once daily.       ranitidine (ZANTAC 75) 75 mg tablet Take 1-2 tablets by mouth once daily. 90 tablet 4     timolol (TIMOPTIC -XE) 0.5 % ophthalmic gel-forming Place 1 Drop into left eye once daily.       valsartan (DIOVAN) 80 mg tablet Take 1 tablet by mouth once daily. 90 tablet 3     No current facility-administered medications for this visit.      Medications have been reviewed by me and are current to the best of my knowledge and ability.      Allergies      Allergen   Reactions     Ciprofloxacin  *Unknown     Lipitor [Atorvastatin]  Nausea And Vomiting and Myalgia     Myalgia      Lisinopril  Cough     Simvastatin  Myalgia     Achey muscles.         REVIEW OF SYSTEMS:  Refer to HPI.    EXAM:   Vitals:    /70  Pulse 76  Temp 97.9  F (36.6  C) (Tympanic)  Wt 71 kg (156 lb 9.6 oz)  Breastfeeding? No  BMI 25.28 kg/m2    General Appearance: Pleasant, alert, appropriate appearance for age. No acute distress  Chest/Respiratory Exam: Normal  chest wall and respirations. Clear to auscultation.  Cardiovascular Exam: Regular rate and rhythm. S1, S2, no murmur, click, gallop, or rubs.  Gastrointestinal Exam: Soft, no masses or organomegaly. Abdomen non-tender. Normal BS x 4. No suprapubic tenderness. No CVA tenderness to palpation. No rebound tenderness or guarding.  Psychiatric Exam: Alert and oriented - appropriate affect.    PHQ Depression Screen  Date of PHQ exam: 10/13/17  Over the last 2 weeks, how often have you been bothered by any of the following problems?  1. Little interest or pleasure in doing things: 0 - Not at all  2. Feeling down, depressed, or hopeless: 0 - Not at all       Labs:   Results for orders placed or performed in visit on 10/13/17        URINALYSIS W REFLEX MICROSCOPIC IF POSITIVE        Result   Value  Ref Range    COLOR                      Yellow  Yellow Color    CLARITY                    Cloudy (A)  Clear Clarity    SPECIFIC GRAVITY,URINE     <=1.005 (A)  1.010, 1.015, 1.020, 1.025                    PH,URINE                   7.0  6.0, 7.0, 8.0, 5.5, 6.5, 7.5, 8.5                    UROBILINOGEN,QUALITATIVE   Normal  Normal EU/dl    PROTEIN, URINE  Negative  Negative mg/dL    GLUCOSE, URINE  Negative  Negative mg/dL    KETONES,URINE              Negative  Negative mg/dL    BILIRUBIN,URINE            Negative  Negative                    OCCULT BLOOD,URINE         Small (A)  Negative                    NITRITE                    Positive (A)  Negative                    LEUKOCYTE ESTERASE         Large (A)  Negative                   URINALYSIS MICROSCOPIC        Result   Value  Ref Range    RBC  3-5 (A)  0-2, None Seen /HPF    WBC   (A)  0-2, 3-5, None Seen /HPF    BACTERIA                   Many (A)  None Seen, Rare, Occasional, Few Bacteria/HPF    EPITHELIAL CELLS           Moderate (A)  None Seen, Few Epi/HPF   URINE CULTURE        Result   Value  Ref Range    CULTURE  RESULT (A)      CULTURE  >100,000 CFU/mL  Citrobacter braakii      CULTURE  50,000-100,000 CFU/mL Klebsiella pneumoniae         Susceptibility         Citrobacter braakii -  (no method available)         AMPICILLIN >16 R ug/mL     CEFEPIME <=8 S ug/mL     CIPROFLOXACIN <=1 S ug/mL     GENTAMICIN <=4 S ug/mL     IMIPENEM 2 I ug/mL     LEVOFLOXACIN <=2 S ug/mL     TETRACYCLINE <=4 S ug/mL     TRIMETHOPRIM/SULF <=2 S ug/mL     NITROFURANTOIN <=32 S ug/mL    Klebsiella pneumoniae -  (no method available)         AMPICILLIN >16 R ug/mL     AZTREONAM <=8 S ug/mL     CEFEPIME <=8 S ug/mL     CEFOTAXIME <=2 S ug/mL     CEFTAZIDIME <=1 S ug/mL     CEFTRIAXONE <=8 S ug/mL     CEFUROXIME <=4 S ug/mL     CIPROFLOXACIN <=1 S ug/mL     GENTAMICIN <=4 S ug/mL     IMIPENEM <=1 S ug/mL     LEVOFLOXACIN <=2 S ug/mL     PIPERACILLIN/TAZO <=16 S ug/mL     TETRACYCLINE <=4 S ug/mL     TRIMETHOPRIM/SULF <=2 S ug/mL     NITROFURANTOIN <=32 S ug/mL       ASSESSMENT AND PLAN:      ICD-10-CM    1. Vaginal yeast infection B37.3 fluconazole (DIFLUCAN) 150 mg tablet   2. Suprapubic pain R10.2 URINALYSIS W REFLEX MICROSCOPIC IF POSITIVE      URINALYSIS W REFLEX MICROSCOPIC IF POSITIVE      URINALYSIS MICROSCOPIC      URINALYSIS MICROSCOPIC   3. Acute urinary tract infection N39.0 URINE CULTURE      cephalexin (KEFLEX) 500 mg capsule      URINE CULTURE       Gave diflucan for a yeast infection.     Started on keflex for an acute UTI. UC pending.   Antibiotic has been sent to pharmacy. Please take full course of antibiotic even if symptoms have completely resolved. This helps prevent against antibiotic resistance.     We will culture the urine to see what bacteria grows out of the urine.  We will call the patient if a change of antibiotic is necessary per the culture.  Patient was instructed in increase fluids including water and cranberry juice.  Monitor for fevers, chills, back pain.  Return to clinic after antibiotic completion if symptoms are not resolved.  Call clinic if symptoms  change/worsen.        Patient Instructions   Completed urine check to rule out bladder infection concerns.     Yeast infection - Patient given fluconazole 150mg #2.  Take 1 tab of fluconazole now.  Repeat in 1 week. If you are still having persistent symptoms after 2 weeks, return for further testing.     If you have a vaginal yeast infection, follow these guidelines:   Follow the full treatment prescribed by your healthcare provider.   After urinating, wipe gently to avoid irritation.   Use unscented soaps.   Avoid using douches and other chemicals, such as bubble bath or hygiene spray, in the vaginal area unless recommended by your healthcare provider.   Take a shower instead of a bath. Pat the genital area dry.   Wear cotton underwear to allow ventilation and to keep the area drier.   Avoid sexual intercourse until the infection is gone.     Here are some things you can do to help prevent yeast infection:   Keep naturally moist areas of the body as cool and dry as possible.   Avoid wearing a wet bathing suit or damp clothing for long periods of time.   Avoid using douches, perfumed soaps, feminine sprays, feminine hygiene deodorants, or scented pads or tampons, and don't take bubble baths.   Wear underwear that is all cotton or has a cotton crotch. Pantyhose should also have a cotton crotch. Cotton allows better air circulation than nylon. Change underwear and pantyhose every day.   Avoid wearing tight pantyhose or tight pants.   Eat yogurt every day.   Avoid frequent or prolonged use of oral antibiotics, if possible. Understand that antibiotics are often not needed and their use should be decided by your healthcare provider. When you are given antibiotics by your provider, take them as directed. Do not share antibiotics with others, and do not save antibiotics for another time.   Lose weight if you are overweight. (Yeast tends to grow in the skin folds of overweight people - especially the breasts, belly, and  carmen.)   If you have diabetes, keep your blood sugar under control.           Ana Williamson PA-C..................10/13/2017 11:30 AM

## 2017-12-28 NOTE — PROGRESS NOTES
Patient Information     Patient Name MRN Sex Karey De La Rosa [id number] Female 10/17/1924      Progress Notes by Byron Huerta MD at 2017 10:15 AM     Author:  Byron Huerta MD Service:  (none) Author Type:  Physician     Filed:  2017  2:47 PM Encounter Date:  2017 Status:  Signed     :  Byron Huerta MD (Physician)            SUBJECTIVE:    Karey Paulson is a 89 y.o. female who presents for follow-up sepsis    HPI Comments: Hospital follow-up for sepsis. She was recently discharged from the hospital to [facility] after having sepsis presumed from a urinary tract infection. Patient reports that she is doing much better. No fevers chills. She continues to be very weak but is gaining her activity back. Patient is requesting a side bed rail. Due to her weakness. States she needs some help getting up out of bed.      Allergies      Allergen   Reactions     Lipitor [Atorvastatin]  Nausea And Vomiting and Myalgia     Myalgia      Lisinopril  Cough     Simvastatin  Myalgia     Achey muscles.     ,   Family History      Problem  Relation Age of Onset     Cancer-breast No Family History    ,   Current Outpatient Prescriptions on File Prior to Visit       Medication  Sig Dispense Refill     amLODIPine (NORVASC) 10 mg tablet Take 1 tablet by mouth once daily. 90 tablet 3     aspirin 81 mg tablet Take 1 tablet by mouth once daily with a meal.       brimonidine (ALPHAGAN P) 0.1 % ophthalmic solution Place 1 Drop into right eye 3 times daily.       cholecalciferol (VITAMIN D) 1,000 unit tablet Take 1 tablet by mouth once daily. 90 tablet 2     codeine-guaiFENesin (ROBITUSSIN AC)  mg/5 mL liquid Take 5 mL by mouth every 4 hours if needed for Cough. Max dose 60 mL per 24 hrs. 120 mL 3     dorzolamide-timolol (COSOPT) 2-0.5 % ophthalmic solution Place 1 Drop into right eye 3 times daily.       latanoprost (XALATAN) 0.005 % ophthalmic solution Place 1 Drop into right eye at bedtime.        prednisoLONE acetate 1% ophthalmic (ECONOPRED PLUS, PRED FORTE, OMNIPRED) suspension Place 1 Drop into right eye once daily.       ranitidine (ZANTAC 75) 75 mg tablet Take 1-2 tablets by mouth once daily. 90 tablet 4     timolol (TIMOPTIC -XE) 0.5 % ophthalmic gel-forming Place 1 Drop into left eye once daily.       valsartan (DIOVAN) 80 mg tablet Take 1 tablet by mouth once daily. 90 tablet 3     No current facility-administered medications on file prior to visit.    ,   Past Surgical History:      Procedure  Laterality Date     ANGIOPLASTY  12-    Angioplasty with 3 bare-metal stents RCA       APPENDECTOMY      Appendectomy       CATARACT REMOVAL      bilat       CHOLECYSTECTOMY      Cholecystectomy       MASTECTOMY      R mastectomy       TOTAL ABDOMINAL HYSTERECTOMY      RUEL, ovaries remaining     ,   Social History     Substance Use Topics       Smoking status: Never Smoker     Smokeless tobacco: Never Used     Alcohol use No    and   Social History     Substance Use Topics       Smoking status: Never Smoker     Smokeless tobacco: Never Used     Alcohol use No       REVIEW OF SYSTEMS:  ROS    OBJECTIVE:  /60  Pulse 60  Temp 97.7  F (36.5  C) (Temporal)  Wt 71.2 kg (157 lb)  BMI 25.34 kg/m2    EXAM:   Physical Exam   Constitutional: She is well-developed, well-nourished, and in no distress.   Pulmonary/Chest: Effort normal.   Neurological: She is alert.   Psychiatric: Affect normal.       ASSESSMENT/PLAN:    ICD-10-CM    1. Sepsis due to Escherichia coli (HC) A41.51 DME   2. Generalized weakness R53.1 DME        Plan:  Status post sepsis. Doing better. Side were rail. Follow-up when necessary.

## 2017-12-28 NOTE — TELEPHONE ENCOUNTER
Patient Information     Patient Name MRN Karey White [id number] Female 10/17/1924      Telephone Encounter by Byron Huerta MD at 2017  4:23 PM     Author:  Byron Huerta MD Service:  (none) Author Type:  Physician     Filed:  2017  4:24 PM Encounter Date:  2017 Status:  Signed     :  Byron Huerta MD (Physician)            Reviewed no changes needed

## 2017-12-28 NOTE — TELEPHONE ENCOUNTER
Patient Information     Patient Name MRN Karey White [id number] Female 10/17/1924      Telephone Encounter by Suzy Bruce RN at 2017  3:19 PM     Author:  Suzy Bruce RN Service:  (none) Author Type:  NURS- Registered Nurse     Filed:  2017  3:22 PM Encounter Date:  2017 Status:  Signed     :  Suzy Bruce RN (NURS- Registered Nurse)            Calcium Channel Blockers    Office visit in the past 12 months or per provider note.    Last visit with OMEGA STAFFORD was on: 2017 in [facility]  Next visit with OMEGA STAFFORD is on: No future appointment listed with this provider  Next visit with Family Practice is on: No future appointment listed in this department  BP Readings from Last 4 Encounters:    17 122/80   17 148/60   17 141/72   17 120/74       Review last provider visit note.  If BP reviewed and plan is noted, can refill.  Max refill for 12 months from last office visit or per provider note.    Prescription refilled per RN Medication Refill Policy.................... Suzy Bruce RN ....................  2017   3:21 PM

## 2017-12-28 NOTE — PROGRESS NOTES
Patient Information     Patient Name MRN Sex Karey De La Rosa [id number] Female 10/17/1924      Progress Notes by James Arambula MD at 2017  9:45 AM     Author:  James Arambula MD Service:  (none) Author Type:  Physician     Filed:  2017 10:31 AM Encounter Date:  2017 Status:  Signed     :  James Arambula MD (Physician)            Type of Visit  Established    Chief Complaint  colovesical fistula    HPI  Ms. Paulson is a 89 y.o. female who follows up 2 weeks after repair of a colovesical fistula.  Her postoperative courses been uncomplicated.  She underwent CT cystogram earlier today.  She presents for a void trial      Review of Systems  I reviewed the ROS the patient today.    Nursing Notes:   Nicol Faustin  2017 10:15 AM  Unsigned  Here for follow up on Cystogram.  Nicol Faustin LPN  2017  9:41 AM  Void Trial  Verbal order read back by James Arambula MD to perform void trial and post void residual bladder scan.  Patient's bladder was filled under gravity with 150mL of sterile saline.  Patient voided on pad and floor when catheter was removed.  Post-void residual was measured by ultrasonic bladder scanner: 37 mL  Nicol Faustin LPN  2017  10:15 AM      Family History  Family History      Problem  Relation Age of Onset     Cancer-breast No Family History        Physical Exam  Vitals:     17 0938   BP: 124/70   Pulse: 68   Resp: 14     Constitutional: NAD, WDWN.  Cardiovascular: Regular rate.  Pulmonary/Chest: Respirations are even and non-labored bilaterally.  Abdominal: Soft. No distension, tenderness, masses or guarding. No CVA tenderness.  Extremities: NEVIN x 4, Warm. No clubbing.  No cyanosis.    Skin: Pink, warm and dry.  No rashes noted.  : catheter in place draining clear urine    Assessment  Ms. Paulson is a 89 y.o. female status post colovesical fistula repair.  She passed void trial and her CT cystogram revealed no leak.    Plan  Follow up with   Jairo as planned today  follow up with me as needed

## 2017-12-28 NOTE — TELEPHONE ENCOUNTER
Patient Information     Patient Name MRN Karey White [id number] Female 10/17/1924      Telephone Encounter by Naomie Biggs at 10/13/2017  2:40 PM     Author:  Naomie Biggs Service:  (none) Author Type:  (none)     Filed:  10/13/2017  2:41 PM Encounter Date:  10/13/2017 Status:  Signed     :  Naomie Biggs            See result note.  Naomie Biggs LPN........................10/13/2017  2:40 PM

## 2017-12-30 NOTE — NURSING NOTE
Patient Information     Patient Name MRN Karey White [id number] Female 10/17/1924      Nursing Note by Poppy Wang at 2017 10:15 AM     Author:  Poppy Wang Service:  (none) Author Type:  (none)     Filed:  2017 10:50 AM Encounter Date:  2017 Status:  Signed     :  Poppy Wang            Patient here for hospital follow up for sepsis, requesting an order for bedrail to assist with transfers to [place]. Feeling better after hospitalization. Poppy Wang LPN .......................2017  10:47 AM

## 2017-12-30 NOTE — NURSING NOTE
Patient Information     Patient Name MRN Karey White [id number] Female 10/17/1924      Nursing Note by Nicol Faustin at 2017  9:45 AM     Author:  Nicol Faustin Service:  (none) Author Type:  (none)     Filed:  2017 10:31 AM Encounter Date:  2017 Status:  Signed     :  Nicol Faustin            Here for follow up on Cystogram.  Nicol Faustin LPN  2017  9:41 AM  Void Trial  Verbal order read back by James Arambula MD to perform void trial and post void residual bladder scan.  Patient's bladder was filled under gravity with 150mL of sterile saline.  Patient voided on pad and floor when catheter was removed.  Post-void residual was measured by ultrasonic bladder scanner: 37 mL  Nicol Faustin LPN  2017  10:15 AM

## 2017-12-30 NOTE — NURSING NOTE
Patient Information     Patient Name MRN Karey White [id number] Female 10/17/1924      Nursing Note by Lola Ibarra RN at 2017  9:30 AM     Author:  Lola Ibarra RN Service:  (none) Author Type:  NURS- Registered Nurse     Filed:  2017  9:48 AM Encounter Date:  2017 Status:  Signed     :  Lola Ibarra RN (NURS- Registered Nurse)            Cephalexin 500mg (2-250mg) capsule by mouth one time now ordered by James Arambula MD.  Medication administered per verbal order.  (same lot# and expiration date for both capsules)  Lot # [id number]  Exp. 18  Patient tolerated well.  Lola Ibarra RN..................2017  9:42 AM

## 2017-12-30 NOTE — NURSING NOTE
Patient Information     Patient Name MRN Karey White [id number] Female 10/17/1924      Nursing Note by Poppy Wang at 2017 12:45 PM     Author:  Poppy Wang Service:  (none) Author Type:  (none)     Filed:  2017 12:49 PM Encounter Date:  2017 Status:  Signed     :  Poppy Wang            Patient here for hospital follow up. The last hospital seen was in 2017. Poppy Wang LPN .......................2017  12:46 PM

## 2017-12-30 NOTE — NURSING NOTE
Patient Information     Patient Name MRN Karey White [id number] Female 10/17/1924      Nursing Note by Naomie Biggs at 10/13/2017 11:15 AM     Author:  Naomie Biggs Service:  (none) Author Type:  (none)     Filed:  10/13/2017 11:32 AM Encounter Date:  10/13/2017 Status:  Signed     :  Naomie Biggs            Patient presents to the clinic for a sharp pain across lower abdomen.  Patient also states that she has had some vaginal itching.   Naomie Biggs LPN........................10/13/2017  11:24 AM

## 2017-12-30 NOTE — NURSING NOTE
Patient Information     Patient Name MRN Karey White [id number] Female 10/17/1924      Nursing Note by Saumya Goodman at 2017 10:00 AM     Author:  Saumya Goodman Service:  (none) Author Type:  (none)     Filed:  2017 10:08 AM Encounter Date:  2017 Status:  Signed     :  Saumya Goodman            Karey Paulson is a 89 y.o. Female here for hospital f/u, from [facility].  Ashley Goodman LPN ...... 2017 10:00 AM

## 2017-12-30 NOTE — NURSING NOTE
Patient Information     Patient Name MRN Karey White [id number] Female 10/17/1924      Nursing Note by Cara Shabazz at 2017  3:30 PM     Author:  Cara Shabazz Service:  (none) Author Type:  (none)     Filed:  2017  3:31 PM Encounter Date:  2017 Status:  Signed     :  Cara Shabazz            Patient is here today for a post op from a sigmoid colectomy. She feels shaky today and feels like she has no strength.    Cara Shabazz LPN.......................... 2017  3:26 PM

## 2018-01-01 ENCOUNTER — APPOINTMENT (OUTPATIENT)
Dept: PHYSICAL THERAPY | Facility: OTHER | Age: 83
DRG: 177 | End: 2018-01-01
Payer: MEDICARE

## 2018-01-01 ENCOUNTER — APPOINTMENT (OUTPATIENT)
Dept: OCCUPATIONAL THERAPY | Facility: OTHER | Age: 83
DRG: 177 | End: 2018-01-01
Payer: MEDICARE

## 2018-01-01 ENCOUNTER — HOSPITAL ENCOUNTER (INPATIENT)
Facility: OTHER | Age: 83
LOS: 5 days | Discharge: HOME | DRG: 177 | End: 2018-12-18
Attending: PHYSICIAN ASSISTANT | Admitting: INTERNAL MEDICINE
Payer: MEDICARE

## 2018-01-01 ENCOUNTER — APPOINTMENT (OUTPATIENT)
Dept: PHYSICAL THERAPY | Facility: OTHER | Age: 83
DRG: 177 | End: 2018-01-01
Attending: FAMILY MEDICINE
Payer: MEDICARE

## 2018-01-01 ENCOUNTER — APPOINTMENT (OUTPATIENT)
Dept: GENERAL RADIOLOGY | Facility: OTHER | Age: 83
DRG: 177 | End: 2018-01-01
Attending: PHYSICIAN ASSISTANT
Payer: MEDICARE

## 2018-01-01 ENCOUNTER — OFFICE VISIT (OUTPATIENT)
Dept: FAMILY MEDICINE | Facility: OTHER | Age: 83
End: 2018-01-01
Attending: FAMILY MEDICINE
Payer: MEDICARE

## 2018-01-01 ENCOUNTER — OFFICE VISIT (OUTPATIENT)
Dept: CARDIOLOGY | Facility: OTHER | Age: 83
End: 2018-01-01
Attending: FAMILY MEDICINE
Payer: MEDICARE

## 2018-01-01 ENCOUNTER — HOSPITAL ENCOUNTER (EMERGENCY)
Facility: OTHER | Age: 83
Discharge: NURSING, REHAB OR LONG-TERM CARE | End: 2018-12-20
Attending: EMERGENCY MEDICINE | Admitting: EMERGENCY MEDICINE
Payer: MEDICARE

## 2018-01-01 ENCOUNTER — APPOINTMENT (OUTPATIENT)
Dept: CT IMAGING | Facility: OTHER | Age: 83
DRG: 177 | End: 2018-01-01
Attending: PHYSICIAN ASSISTANT
Payer: MEDICARE

## 2018-01-01 ENCOUNTER — APPOINTMENT (OUTPATIENT)
Dept: GENERAL RADIOLOGY | Facility: OTHER | Age: 83
End: 2018-01-01
Attending: EMERGENCY MEDICINE
Payer: MEDICARE

## 2018-01-01 VITALS
DIASTOLIC BLOOD PRESSURE: 70 MMHG | TEMPERATURE: 97.7 F | SYSTOLIC BLOOD PRESSURE: 136 MMHG | OXYGEN SATURATION: 95 % | HEIGHT: 66 IN | BODY MASS INDEX: 19.93 KG/M2 | HEART RATE: 68 BPM | WEIGHT: 124 LBS

## 2018-01-01 VITALS
HEIGHT: 66 IN | BODY MASS INDEX: 22.66 KG/M2 | TEMPERATURE: 98.6 F | OXYGEN SATURATION: 96 % | RESPIRATION RATE: 18 BRPM | DIASTOLIC BLOOD PRESSURE: 131 MMHG | SYSTOLIC BLOOD PRESSURE: 143 MMHG | WEIGHT: 141 LBS | HEART RATE: 95 BPM

## 2018-01-01 VITALS
HEART RATE: 74 BPM | OXYGEN SATURATION: 94 % | SYSTOLIC BLOOD PRESSURE: 168 MMHG | BODY MASS INDEX: 22.78 KG/M2 | RESPIRATION RATE: 22 BRPM | DIASTOLIC BLOOD PRESSURE: 80 MMHG | TEMPERATURE: 98.8 F | HEIGHT: 66 IN | WEIGHT: 141.76 LBS

## 2018-01-01 VITALS
WEIGHT: 139 LBS | BODY MASS INDEX: 23.16 KG/M2 | SYSTOLIC BLOOD PRESSURE: 160 MMHG | DIASTOLIC BLOOD PRESSURE: 80 MMHG | HEIGHT: 65 IN | HEART RATE: 60 BPM

## 2018-01-01 DIAGNOSIS — R55 SYNCOPE, UNSPECIFIED SYNCOPE TYPE: ICD-10-CM

## 2018-01-01 DIAGNOSIS — M62.81 GENERALIZED MUSCLE WEAKNESS: ICD-10-CM

## 2018-01-01 DIAGNOSIS — R19.7 DIARRHEA OF PRESUMED INFECTIOUS ORIGIN: ICD-10-CM

## 2018-01-01 DIAGNOSIS — Z98.890 HX OF CARDIAC CATH: ICD-10-CM

## 2018-01-01 DIAGNOSIS — J18.9 PNEUMONIA DUE TO INFECTIOUS ORGANISM, UNSPECIFIED LATERALITY, UNSPECIFIED PART OF LUNG: ICD-10-CM

## 2018-01-01 DIAGNOSIS — R19.7 NAUSEA VOMITING AND DIARRHEA: ICD-10-CM

## 2018-01-01 DIAGNOSIS — R55 SYNCOPE AND COLLAPSE: ICD-10-CM

## 2018-01-01 DIAGNOSIS — I44.0 FIRST DEGREE HEART BLOCK: ICD-10-CM

## 2018-01-01 DIAGNOSIS — K21.9 GASTROESOPHAGEAL REFLUX DISEASE WITHOUT ESOPHAGITIS: Primary | ICD-10-CM

## 2018-01-01 DIAGNOSIS — I25.2 HISTORY OF MYOCARDIAL INFARCTION: ICD-10-CM

## 2018-01-01 DIAGNOSIS — I45.5 SINUS PAUSE: ICD-10-CM

## 2018-01-01 DIAGNOSIS — J96.01 ACUTE RESPIRATORY FAILURE WITH HYPOXIA (H): ICD-10-CM

## 2018-01-01 DIAGNOSIS — I10 ESSENTIAL HYPERTENSION, MALIGNANT: ICD-10-CM

## 2018-01-01 DIAGNOSIS — R00.1 BRADYCARDIA: Primary | ICD-10-CM

## 2018-01-01 DIAGNOSIS — M15.0 PRIMARY OSTEOARTHRITIS INVOLVING MULTIPLE JOINTS: ICD-10-CM

## 2018-01-01 DIAGNOSIS — K21.9 GASTROESOPHAGEAL REFLUX DISEASE WITHOUT ESOPHAGITIS: ICD-10-CM

## 2018-01-01 DIAGNOSIS — E87.6 HYPOKALEMIA: Primary | ICD-10-CM

## 2018-01-01 DIAGNOSIS — I10 ESSENTIAL HYPERTENSION: ICD-10-CM

## 2018-01-01 DIAGNOSIS — R11.2 NAUSEA AND VOMITING, INTRACTABILITY OF VOMITING NOT SPECIFIED, UNSPECIFIED VOMITING TYPE: ICD-10-CM

## 2018-01-01 DIAGNOSIS — E78.2 MIXED HYPERLIPIDEMIA: ICD-10-CM

## 2018-01-01 DIAGNOSIS — I25.2 OLD MYOCARDIAL INFARCTION: ICD-10-CM

## 2018-01-01 DIAGNOSIS — J69.0 PNEUMONITIS DUE TO INHALATION OF FOOD OR VOMITUS (H): ICD-10-CM

## 2018-01-01 DIAGNOSIS — R11.2 NAUSEA VOMITING AND DIARRHEA: ICD-10-CM

## 2018-01-01 DIAGNOSIS — Z95.5 STENTED CORONARY ARTERY: ICD-10-CM

## 2018-01-01 DIAGNOSIS — Z74.09 IMPAIRED MOBILITY AND ADLS: ICD-10-CM

## 2018-01-01 DIAGNOSIS — J69.0 ASPIRATION PNEUMONITIS (H): ICD-10-CM

## 2018-01-01 DIAGNOSIS — Z78.9 IMPAIRED MOBILITY AND ADLS: ICD-10-CM

## 2018-01-01 DIAGNOSIS — J69.0 ASPIRATION PNEUMONIA DUE TO VOMIT, UNSPECIFIED LATERALITY, UNSPECIFIED PART OF LUNG (H): Primary | ICD-10-CM

## 2018-01-01 LAB
ALBUMIN SERPL-MCNC: 2.7 G/DL (ref 3.5–5.7)
ALBUMIN SERPL-MCNC: 2.9 G/DL (ref 3.5–5.7)
ALBUMIN SERPL-MCNC: 3.1 G/DL (ref 3.5–5.7)
ALBUMIN SERPL-MCNC: 3.7 G/DL (ref 3.5–5.7)
ALBUMIN UR-MCNC: NEGATIVE MG/DL
ALBUMIN UR-MCNC: NEGATIVE MG/DL
ALP SERPL-CCNC: 29 U/L (ref 34–104)
ALP SERPL-CCNC: 34 U/L (ref 34–104)
ALP SERPL-CCNC: 44 U/L (ref 34–104)
ALP SERPL-CCNC: 44 U/L (ref 34–104)
ALT SERPL W P-5'-P-CCNC: 14 U/L (ref 7–52)
ALT SERPL W P-5'-P-CCNC: 15 U/L (ref 7–52)
ALT SERPL W P-5'-P-CCNC: 16 U/L (ref 7–52)
ALT SERPL W P-5'-P-CCNC: 19 U/L (ref 7–52)
ANION GAP SERPL CALCULATED.3IONS-SCNC: 11 MMOL/L (ref 3–14)
ANION GAP SERPL CALCULATED.3IONS-SCNC: 11 MMOL/L (ref 6–17)
ANION GAP SERPL CALCULATED.3IONS-SCNC: 7 MMOL/L (ref 3–14)
ANION GAP SERPL CALCULATED.3IONS-SCNC: 8 MMOL/L (ref 3–14)
ANION GAP SERPL CALCULATED.3IONS-SCNC: 9 MMOL/L (ref 3–14)
ANION GAP SERPL CALCULATED.3IONS-SCNC: 9 MMOL/L (ref 3–14)
APPEARANCE UR: CLEAR
APPEARANCE UR: CLEAR
AST SERPL W P-5'-P-CCNC: 18 U/L (ref 13–39)
AST SERPL W P-5'-P-CCNC: 21 U/L (ref 13–39)
AST SERPL W P-5'-P-CCNC: 23 U/L (ref 13–39)
AST SERPL W P-5'-P-CCNC: 24 U/L (ref 13–39)
BACTERIA SPEC CULT: NORMAL
BASOPHILS # BLD AUTO: 0 10E9/L (ref 0–0.2)
BASOPHILS # BLD AUTO: 0.1 10E9/L (ref 0–0.2)
BASOPHILS NFR BLD AUTO: 0.3 %
BASOPHILS NFR BLD AUTO: 0.6 %
BILIRUB SERPL-MCNC: 0.5 MG/DL (ref 0.3–1)
BILIRUB SERPL-MCNC: 0.5 MG/DL (ref 0.3–1)
BILIRUB SERPL-MCNC: 0.6 MG/DL (ref 0.3–1)
BILIRUB SERPL-MCNC: 0.7 MG/DL (ref 0.3–1)
BILIRUB UR QL STRIP: NEGATIVE
BILIRUB UR QL STRIP: NEGATIVE
BUN SERPL-MCNC: 14 MG/DL (ref 7–25)
BUN SERPL-MCNC: 19 MG/DL (ref 7–25)
BUN SERPL-MCNC: 21 MG/DL (ref 7–25)
BUN SERPL-MCNC: 27 MG/DL (ref 7–25)
BUN SERPL-MCNC: 30 MG/DL (ref 7–25)
BUN SERPL-MCNC: 33 MG/DL (ref 7–25)
C COLI+JEJUNI+LARI FUSA STL QL NAA+PROBE: NOT DETECTED
C DIFF TOX B STL QL: NEGATIVE
CALCIUM SERPL-MCNC: 8.2 MG/DL (ref 8.6–10.3)
CALCIUM SERPL-MCNC: 8.5 MG/DL (ref 8.6–10.3)
CALCIUM SERPL-MCNC: 8.6 MG/DL (ref 8.6–10.3)
CALCIUM SERPL-MCNC: 8.9 MG/DL (ref 8.6–10.3)
CALCIUM SERPL-MCNC: 9.2 MG/DL (ref 8.6–10.3)
CALCIUM SERPL-MCNC: 9.5 MG/DL (ref 8.6–10.3)
CHLORIDE SERPL-SCNC: 108 MMOL/L (ref 98–107)
CHLORIDE SERPL-SCNC: 109 MMOL/L (ref 98–107)
CHLORIDE SERPL-SCNC: 109 MMOL/L (ref 98–107)
CHLORIDE SERPL-SCNC: 114 MMOL/L (ref 98–107)
CHLORIDE SERPL-SCNC: 117 MMOL/L (ref 98–107)
CHLORIDE SERPL-SCNC: 118 MMOL/L (ref 98–107)
CO2 SERPL-SCNC: 15 MMOL/L (ref 21–31)
CO2 SERPL-SCNC: 19 MMOL/L (ref 21–31)
CO2 SERPL-SCNC: 19 MMOL/L (ref 21–31)
CO2 SERPL-SCNC: 20 MMOL/L (ref 21–31)
CO2 SERPL-SCNC: 20 MMOL/L (ref 21–31)
CO2 SERPL-SCNC: 21 MMOL/L (ref 21–31)
COLOR UR AUTO: YELLOW
COLOR UR AUTO: YELLOW
CREAT SERPL-MCNC: 0.43 MG/DL (ref 0.6–1.2)
CREAT SERPL-MCNC: 0.45 MG/DL (ref 0.6–1.2)
CREAT SERPL-MCNC: 0.49 MG/DL (ref 0.6–1.2)
CREAT SERPL-MCNC: 0.67 MG/DL (ref 0.6–1.2)
CREAT SERPL-MCNC: 0.69 MG/DL (ref 0.6–1.2)
CREAT SERPL-MCNC: 0.98 MG/DL (ref 0.6–1.2)
DIFFERENTIAL METHOD BLD: ABNORMAL
DIFFERENTIAL METHOD BLD: NORMAL
EC STX1 GENE STL QL NAA+PROBE: NOT DETECTED
EC STX2 GENE STL QL NAA+PROBE: NOT DETECTED
ENTERIC PATHOGEN COMMENT: ABNORMAL
EOSINOPHIL # BLD AUTO: 0.1 10E9/L (ref 0–0.7)
EOSINOPHIL # BLD AUTO: 0.1 10E9/L (ref 0–0.7)
EOSINOPHIL NFR BLD AUTO: 1.3 %
EOSINOPHIL NFR BLD AUTO: 1.4 %
ERYTHROCYTE [DISTWIDTH] IN BLOOD BY AUTOMATED COUNT: 14.5 % (ref 10–15)
ERYTHROCYTE [DISTWIDTH] IN BLOOD BY AUTOMATED COUNT: 14.5 % (ref 10–15)
ERYTHROCYTE [DISTWIDTH] IN BLOOD BY AUTOMATED COUNT: 14.7 % (ref 10–15)
ERYTHROCYTE [DISTWIDTH] IN BLOOD BY AUTOMATED COUNT: 14.9 % (ref 10–15)
ERYTHROCYTE [DISTWIDTH] IN BLOOD BY AUTOMATED COUNT: 15 % (ref 10–15)
ERYTHROCYTE [DISTWIDTH] IN BLOOD BY AUTOMATED COUNT: 15.5 % (ref 10–15)
GFR SERPL CREATININE-BSD FRML MDRD: 53 ML/MIN/1.7M2
GFR SERPL CREATININE-BSD FRML MDRD: 79 ML/MIN/1.7M2
GFR SERPL CREATININE-BSD FRML MDRD: 82 ML/MIN/1.7M2
GFR SERPL CREATININE-BSD FRML MDRD: >90 ML/MIN/1.7M2
GFR SERPL CREATININE-BSD FRML MDRD: >90 ML/MIN/1.7M2
GFR SERPL CREATININE-BSD FRML MDRD: >90 ML/MIN/{1.73_M2}
GLUCOSE SERPL-MCNC: 118 MG/DL (ref 70–105)
GLUCOSE SERPL-MCNC: 143 MG/DL (ref 70–105)
GLUCOSE SERPL-MCNC: 188 MG/DL (ref 70–105)
GLUCOSE SERPL-MCNC: 47 MG/DL (ref 70–105)
GLUCOSE SERPL-MCNC: 74 MG/DL (ref 70–105)
GLUCOSE SERPL-MCNC: 85 MG/DL (ref 70–105)
GLUCOSE UR STRIP-MCNC: NEGATIVE MG/DL
GLUCOSE UR STRIP-MCNC: NEGATIVE MG/DL
HCT VFR BLD AUTO: 33.4 % (ref 35–47)
HCT VFR BLD AUTO: 33.7 % (ref 35–47)
HCT VFR BLD AUTO: 37.8 % (ref 35–47)
HCT VFR BLD AUTO: 38.2 % (ref 35–47)
HCT VFR BLD AUTO: 38.8 % (ref 35–47)
HCT VFR BLD AUTO: 42.1 % (ref 35–47)
HGB BLD-MCNC: 11 G/DL (ref 11.7–15.7)
HGB BLD-MCNC: 11.1 G/DL (ref 11.7–15.7)
HGB BLD-MCNC: 12.1 G/DL (ref 11.7–15.7)
HGB BLD-MCNC: 12.2 G/DL (ref 11.7–15.7)
HGB BLD-MCNC: 12.8 G/DL (ref 11.7–15.7)
HGB BLD-MCNC: 13.3 G/DL (ref 11.7–15.7)
HGB UR QL STRIP: NEGATIVE
HGB UR QL STRIP: NEGATIVE
IMM GRANULOCYTES # BLD: 0 10E9/L (ref 0–0.4)
IMM GRANULOCYTES # BLD: 0.1 10E9/L (ref 0–0.4)
IMM GRANULOCYTES NFR BLD: 0.2 %
IMM GRANULOCYTES NFR BLD: 0.6 %
KETONES UR STRIP-MCNC: ABNORMAL MG/DL
KETONES UR STRIP-MCNC: NEGATIVE MG/DL
LACTATE SERPL-SCNC: 2.2 MMOL/L (ref 0.5–2.2)
LEUKOCYTE ESTERASE UR QL STRIP: NEGATIVE
LEUKOCYTE ESTERASE UR QL STRIP: NEGATIVE
LYMPHOCYTES # BLD AUTO: 0.2 10E9/L (ref 0.8–5.3)
LYMPHOCYTES # BLD AUTO: 1 10E9/L (ref 0.8–5.3)
LYMPHOCYTES NFR BLD AUTO: 11.6 %
LYMPHOCYTES NFR BLD AUTO: 2.3 %
MAGNESIUM SERPL-MCNC: 2 MG/DL (ref 1.9–2.7)
MCH RBC QN AUTO: 28.7 PG (ref 26.5–33)
MCH RBC QN AUTO: 28.8 PG (ref 26.5–33)
MCH RBC QN AUTO: 28.9 PG (ref 26.5–33)
MCH RBC QN AUTO: 29.5 PG (ref 26.5–33)
MCHC RBC AUTO-ENTMCNC: 31.6 G/DL (ref 31.5–36.5)
MCHC RBC AUTO-ENTMCNC: 31.7 G/DL (ref 31.5–36.5)
MCHC RBC AUTO-ENTMCNC: 32.3 G/DL (ref 31.5–36.5)
MCHC RBC AUTO-ENTMCNC: 32.9 G/DL (ref 31.5–36.5)
MCHC RBC AUTO-ENTMCNC: 32.9 G/DL (ref 31.5–36.5)
MCHC RBC AUTO-ENTMCNC: 33 G/DL (ref 31.5–36.5)
MCV RBC AUTO: 87 FL (ref 78–100)
MCV RBC AUTO: 87 FL (ref 78–100)
MCV RBC AUTO: 88 FL (ref 78–100)
MCV RBC AUTO: 91 FL (ref 78–100)
MCV RBC AUTO: 92 FL (ref 78–100)
MCV RBC AUTO: 92 FL (ref 78–100)
MONOCYTES # BLD AUTO: 0.2 10E9/L (ref 0–1.3)
MONOCYTES # BLD AUTO: 0.7 10E9/L (ref 0–1.3)
MONOCYTES NFR BLD AUTO: 2.8 %
MONOCYTES NFR BLD AUTO: 7.5 %
NEUTROPHILS # BLD AUTO: 6.9 10E9/L (ref 1.6–8.3)
NEUTROPHILS # BLD AUTO: 8 10E9/L (ref 1.6–8.3)
NEUTROPHILS NFR BLD AUTO: 78.3 %
NEUTROPHILS NFR BLD AUTO: 93.1 %
NITRATE UR QL: NEGATIVE
NITRATE UR QL: NEGATIVE
NOROV GI+II ORF1-ORF2 JNC STL QL NAA+PR: ABNORMAL
PH UR STRIP: 6.5 PH (ref 5–9)
PH UR STRIP: 7 PH (ref 5–9)
PLATELET # BLD AUTO: 156 10E9/L (ref 150–450)
PLATELET # BLD AUTO: 164 10E9/L (ref 150–450)
PLATELET # BLD AUTO: 172 10E9/L (ref 150–450)
PLATELET # BLD AUTO: 178 10E9/L (ref 150–450)
PLATELET # BLD AUTO: 244 10E9/L (ref 150–450)
PLATELET # BLD AUTO: 290 10E9/L (ref 150–450)
POTASSIUM SERPL-SCNC: 2.9 MMOL/L (ref 3.5–5.1)
POTASSIUM SERPL-SCNC: 3 MMOL/L (ref 3.5–5.1)
POTASSIUM SERPL-SCNC: 3 MMOL/L (ref 3.5–5.1)
POTASSIUM SERPL-SCNC: 3.1 MMOL/L (ref 3.5–5.1)
POTASSIUM SERPL-SCNC: 3.1 MMOL/L (ref 3.5–5.1)
POTASSIUM SERPL-SCNC: 3.3 MMOL/L (ref 3.5–5.1)
POTASSIUM SERPL-SCNC: 3.4 MMOL/L (ref 3.5–5.1)
POTASSIUM SERPL-SCNC: 3.5 MMOL/L (ref 3.5–5.1)
POTASSIUM SERPL-SCNC: 3.6 MMOL/L (ref 3.5–5.1)
POTASSIUM SERPL-SCNC: 3.8 MMOL/L (ref 3.5–5.1)
POTASSIUM SERPL-SCNC: 3.9 MMOL/L (ref 3.5–5.1)
PROT SERPL-MCNC: 5.2 G/DL (ref 6.4–8.9)
PROT SERPL-MCNC: 5.5 G/DL (ref 6.4–8.9)
PROT SERPL-MCNC: 6.3 G/DL (ref 6.4–8.9)
PROT SERPL-MCNC: 6.7 G/DL (ref 6.4–8.9)
RBC # BLD AUTO: 3.82 10E12/L (ref 3.8–5.2)
RBC # BLD AUTO: 3.84 10E12/L (ref 3.8–5.2)
RBC # BLD AUTO: 4.13 10E12/L (ref 3.8–5.2)
RBC # BLD AUTO: 4.19 10E12/L (ref 3.8–5.2)
RBC # BLD AUTO: 4.46 10E12/L (ref 3.8–5.2)
RBC # BLD AUTO: 4.6 10E12/L (ref 3.8–5.2)
RVA NSP5 STL QL NAA+PROBE: NOT DETECTED
SALMONELLA SP RPOD STL QL NAA+PROBE: NOT DETECTED
SHIGELLA SP+EIEC IPAH STL QL NAA+PROBE: NOT DETECTED
SODIUM SERPL-SCNC: 137 MMOL/L (ref 134–144)
SODIUM SERPL-SCNC: 138 MMOL/L (ref 134–144)
SODIUM SERPL-SCNC: 139 MMOL/L (ref 134–144)
SODIUM SERPL-SCNC: 140 MMOL/L (ref 134–144)
SODIUM SERPL-SCNC: 144 MMOL/L (ref 134–144)
SODIUM SERPL-SCNC: 146 MMOL/L (ref 134–144)
SOURCE: ABNORMAL
SOURCE: NORMAL
SP GR UR STRIP: 1.01 (ref 1–1.03)
SP GR UR STRIP: 1.02 (ref 1–1.03)
SPECIMEN SOURCE: NORMAL
TROPONIN I SERPL-MCNC: <0.03 UG/L (ref 0–0.03)
UROBILINOGEN UR STRIP-ACNC: 0.2 EU/DL (ref 0.2–1)
UROBILINOGEN UR STRIP-ACNC: 0.2 EU/DL (ref 0.2–1)
V CHOL+PARA RFBL+TRKH+TNAA STL QL NAA+PR: NOT DETECTED
WBC # BLD AUTO: 17 10E9/L (ref 4–11)
WBC # BLD AUTO: 19.1 10E9/L (ref 4–11)
WBC # BLD AUTO: 19.8 10E9/L (ref 4–11)
WBC # BLD AUTO: 21.3 10E9/L (ref 4–11)
WBC # BLD AUTO: 8.6 10E9/L (ref 4–11)
WBC # BLD AUTO: 8.8 10E9/L (ref 4–11)
Y ENTERO RECN STL QL NAA+PROBE: NOT DETECTED

## 2018-01-01 PROCEDURE — 25000128 H RX IP 250 OP 636: Performed by: FAMILY MEDICINE

## 2018-01-01 PROCEDURE — 25000128 H RX IP 250 OP 636: Performed by: INTERNAL MEDICINE

## 2018-01-01 PROCEDURE — 93010 ELECTROCARDIOGRAM REPORT: CPT | Performed by: INTERNAL MEDICINE

## 2018-01-01 PROCEDURE — 12000000 ZZH R&B MED SURG/OB

## 2018-01-01 PROCEDURE — 85027 COMPLETE CBC AUTOMATED: CPT | Performed by: INTERNAL MEDICINE

## 2018-01-01 PROCEDURE — 40000275 ZZH STATISTIC RCP TIME EA 10 MIN

## 2018-01-01 PROCEDURE — A9270 NON-COVERED ITEM OR SERVICE: HCPCS | Mod: GY | Performed by: FAMILY MEDICINE

## 2018-01-01 PROCEDURE — G0463 HOSPITAL OUTPT CLINIC VISIT: HCPCS

## 2018-01-01 PROCEDURE — 27210995 ZZH RX 272: Performed by: FAMILY MEDICINE

## 2018-01-01 PROCEDURE — 25500064 ZZH RX 255 OP 636: Performed by: PHYSICIAN ASSISTANT

## 2018-01-01 PROCEDURE — 99284 EMERGENCY DEPT VISIT MOD MDM: CPT | Mod: Z6 | Performed by: EMERGENCY MEDICINE

## 2018-01-01 PROCEDURE — 80053 COMPREHEN METABOLIC PANEL: CPT | Performed by: EMERGENCY MEDICINE

## 2018-01-01 PROCEDURE — A9270 NON-COVERED ITEM OR SERVICE: HCPCS | Mod: GY | Performed by: EMERGENCY MEDICINE

## 2018-01-01 PROCEDURE — 99291 CRITICAL CARE FIRST HOUR: CPT | Mod: 25 | Performed by: PHYSICIAN ASSISTANT

## 2018-01-01 PROCEDURE — 36415 COLL VENOUS BLD VENIPUNCTURE: CPT | Performed by: PHYSICIAN ASSISTANT

## 2018-01-01 PROCEDURE — 97161 PT EVAL LOW COMPLEX 20 MIN: CPT | Mod: GP

## 2018-01-01 PROCEDURE — 25000132 ZZH RX MED GY IP 250 OP 250 PS 637: Mod: GY | Performed by: FAMILY MEDICINE

## 2018-01-01 PROCEDURE — 25000132 ZZH RX MED GY IP 250 OP 250 PS 637: Mod: GY | Performed by: INTERNAL MEDICINE

## 2018-01-01 PROCEDURE — 97530 THERAPEUTIC ACTIVITIES: CPT | Mod: GP

## 2018-01-01 PROCEDURE — 85027 COMPLETE CBC AUTOMATED: CPT | Performed by: FAMILY MEDICINE

## 2018-01-01 PROCEDURE — 99233 SBSQ HOSP IP/OBS HIGH 50: CPT | Performed by: FAMILY MEDICINE

## 2018-01-01 PROCEDURE — 93005 ELECTROCARDIOGRAM TRACING: CPT | Performed by: INTERNAL MEDICINE

## 2018-01-01 PROCEDURE — 25000132 ZZH RX MED GY IP 250 OP 250 PS 637: Mod: GY | Performed by: EMERGENCY MEDICINE

## 2018-01-01 PROCEDURE — 87493 C DIFF AMPLIFIED PROBE: CPT | Performed by: PHYSICIAN ASSISTANT

## 2018-01-01 PROCEDURE — 87506 IADNA-DNA/RNA PROBE TQ 6-11: CPT | Performed by: PHYSICIAN ASSISTANT

## 2018-01-01 PROCEDURE — 80048 BASIC METABOLIC PNL TOTAL CA: CPT | Performed by: FAMILY MEDICINE

## 2018-01-01 PROCEDURE — 80053 COMPREHEN METABOLIC PANEL: CPT | Performed by: PHYSICIAN ASSISTANT

## 2018-01-01 PROCEDURE — 40000193 ZZH STATISTIC PT WARD VISIT

## 2018-01-01 PROCEDURE — 36415 COLL VENOUS BLD VENIPUNCTURE: CPT | Performed by: FAMILY MEDICINE

## 2018-01-01 PROCEDURE — 99285 EMERGENCY DEPT VISIT HI MDM: CPT | Mod: 25 | Performed by: EMERGENCY MEDICINE

## 2018-01-01 PROCEDURE — 20000006 ZZH R&B ICU - GICH

## 2018-01-01 PROCEDURE — 25000128 H RX IP 250 OP 636: Performed by: PHYSICIAN ASSISTANT

## 2018-01-01 PROCEDURE — 87081 CULTURE SCREEN ONLY: CPT | Performed by: INTERNAL MEDICINE

## 2018-01-01 PROCEDURE — 97535 SELF CARE MNGMENT TRAINING: CPT | Mod: GO | Performed by: OCCUPATIONAL THERAPIST

## 2018-01-01 PROCEDURE — 99223 1ST HOSP IP/OBS HIGH 75: CPT | Mod: AI | Performed by: INTERNAL MEDICINE

## 2018-01-01 PROCEDURE — 99232 SBSQ HOSP IP/OBS MODERATE 35: CPT | Performed by: FAMILY MEDICINE

## 2018-01-01 PROCEDURE — C9113 INJ PANTOPRAZOLE SODIUM, VIA: HCPCS | Performed by: FAMILY MEDICINE

## 2018-01-01 PROCEDURE — 84132 ASSAY OF SERUM POTASSIUM: CPT | Performed by: FAMILY MEDICINE

## 2018-01-01 PROCEDURE — 83605 ASSAY OF LACTIC ACID: CPT | Performed by: PHYSICIAN ASSISTANT

## 2018-01-01 PROCEDURE — 84132 ASSAY OF SERUM POTASSIUM: CPT | Performed by: INTERNAL MEDICINE

## 2018-01-01 PROCEDURE — 71045 X-RAY EXAM CHEST 1 VIEW: CPT

## 2018-01-01 PROCEDURE — 74177 CT ABD & PELVIS W/CONTRAST: CPT

## 2018-01-01 PROCEDURE — 80053 COMPREHEN METABOLIC PANEL: CPT | Performed by: INTERNAL MEDICINE

## 2018-01-01 PROCEDURE — 40000133 ZZH STATISTIC OT WARD VISIT: Performed by: OCCUPATIONAL THERAPIST

## 2018-01-01 PROCEDURE — 93005 ELECTROCARDIOGRAM TRACING: CPT | Mod: 76 | Performed by: PHYSICIAN ASSISTANT

## 2018-01-01 PROCEDURE — 84484 ASSAY OF TROPONIN QUANT: CPT | Performed by: PHYSICIAN ASSISTANT

## 2018-01-01 PROCEDURE — 80053 COMPREHEN METABOLIC PANEL: CPT | Performed by: FAMILY MEDICINE

## 2018-01-01 PROCEDURE — 71046 X-RAY EXAM CHEST 2 VIEWS: CPT

## 2018-01-01 PROCEDURE — 25000125 ZZHC RX 250: Performed by: FAMILY MEDICINE

## 2018-01-01 PROCEDURE — 99291 CRITICAL CARE FIRST HOUR: CPT | Mod: Z6 | Performed by: PHYSICIAN ASSISTANT

## 2018-01-01 PROCEDURE — 99205 OFFICE O/P NEW HI 60 MIN: CPT | Performed by: INTERNAL MEDICINE

## 2018-01-01 PROCEDURE — 93005 ELECTROCARDIOGRAM TRACING: CPT | Performed by: PHYSICIAN ASSISTANT

## 2018-01-01 PROCEDURE — 96375 TX/PRO/DX INJ NEW DRUG ADDON: CPT | Performed by: PHYSICIAN ASSISTANT

## 2018-01-01 PROCEDURE — 96365 THER/PROPH/DIAG IV INF INIT: CPT | Performed by: PHYSICIAN ASSISTANT

## 2018-01-01 PROCEDURE — 36415 COLL VENOUS BLD VENIPUNCTURE: CPT | Performed by: INTERNAL MEDICINE

## 2018-01-01 PROCEDURE — 25000125 ZZHC RX 250: Performed by: PHYSICIAN ASSISTANT

## 2018-01-01 PROCEDURE — 94640 AIRWAY INHALATION TREATMENT: CPT

## 2018-01-01 PROCEDURE — 87040 BLOOD CULTURE FOR BACTERIA: CPT | Mod: 91 | Performed by: PHYSICIAN ASSISTANT

## 2018-01-01 PROCEDURE — 99232 SBSQ HOSP IP/OBS MODERATE 35: CPT | Performed by: INTERNAL MEDICINE

## 2018-01-01 PROCEDURE — A9270 NON-COVERED ITEM OR SERVICE: HCPCS | Mod: GY | Performed by: INTERNAL MEDICINE

## 2018-01-01 PROCEDURE — 96361 HYDRATE IV INFUSION ADD-ON: CPT | Performed by: PHYSICIAN ASSISTANT

## 2018-01-01 PROCEDURE — 81003 URINALYSIS AUTO W/O SCOPE: CPT | Performed by: EMERGENCY MEDICINE

## 2018-01-01 PROCEDURE — 87040 BLOOD CULTURE FOR BACTERIA: CPT | Performed by: PHYSICIAN ASSISTANT

## 2018-01-01 PROCEDURE — 93288 INTERROG EVL PM/LDLS PM IP: CPT | Mod: TC | Performed by: INTERNAL MEDICINE

## 2018-01-01 PROCEDURE — 85025 COMPLETE CBC W/AUTO DIFF WBC: CPT | Performed by: PHYSICIAN ASSISTANT

## 2018-01-01 PROCEDURE — 36415 COLL VENOUS BLD VENIPUNCTURE: CPT | Performed by: EMERGENCY MEDICINE

## 2018-01-01 PROCEDURE — G0463 HOSPITAL OUTPT CLINIC VISIT: HCPCS | Mod: 25

## 2018-01-01 PROCEDURE — 25000131 ZZH RX MED GY IP 250 OP 636 PS 637: Mod: GY | Performed by: INTERNAL MEDICINE

## 2018-01-01 PROCEDURE — 83735 ASSAY OF MAGNESIUM: CPT | Performed by: PHYSICIAN ASSISTANT

## 2018-01-01 PROCEDURE — 99213 OFFICE O/P EST LOW 20 MIN: CPT | Performed by: FAMILY MEDICINE

## 2018-01-01 PROCEDURE — 81003 URINALYSIS AUTO W/O SCOPE: CPT | Performed by: PHYSICIAN ASSISTANT

## 2018-01-01 PROCEDURE — 97165 OT EVAL LOW COMPLEX 30 MIN: CPT | Mod: GO | Performed by: OCCUPATIONAL THERAPIST

## 2018-01-01 PROCEDURE — 99239 HOSP IP/OBS DSCHRG MGMT >30: CPT | Performed by: INTERNAL MEDICINE

## 2018-01-01 PROCEDURE — 85025 COMPLETE CBC W/AUTO DIFF WBC: CPT | Performed by: EMERGENCY MEDICINE

## 2018-01-01 RX ORDER — SODIUM CHLORIDE 9 MG/ML
INJECTION, SOLUTION INTRAVENOUS CONTINUOUS
Status: DISCONTINUED | OUTPATIENT
Start: 2018-01-01 | End: 2018-01-01 | Stop reason: DRUGHIGH

## 2018-01-01 RX ORDER — ONDANSETRON 2 MG/ML
4 INJECTION INTRAMUSCULAR; INTRAVENOUS EVERY 30 MIN PRN
Status: COMPLETED | OUTPATIENT
Start: 2018-01-01 | End: 2018-01-01

## 2018-01-01 RX ORDER — CEFOTAXIME INJECTION 2 G/1
2 POWDER, FOR SOLUTION INTRAMUSCULAR; INTRAVENOUS EVERY 8 HOURS
Status: DISCONTINUED | OUTPATIENT
Start: 2018-01-01 | End: 2018-01-01

## 2018-01-01 RX ORDER — ALBUTEROL SULFATE 0.83 MG/ML
2.5 SOLUTION RESPIRATORY (INHALATION) ONCE
Status: COMPLETED | OUTPATIENT
Start: 2018-01-01 | End: 2018-01-01

## 2018-01-01 RX ORDER — LOSARTAN POTASSIUM 50 MG/1
100 TABLET ORAL DAILY
Status: DISCONTINUED | OUTPATIENT
Start: 2018-01-01 | End: 2018-01-01 | Stop reason: HOSPADM

## 2018-01-01 RX ORDER — PROCHLORPERAZINE MALEATE 5 MG
5 TABLET ORAL EVERY 6 HOURS PRN
Status: DISCONTINUED | OUTPATIENT
Start: 2018-01-01 | End: 2018-01-01 | Stop reason: HOSPADM

## 2018-01-01 RX ORDER — AMLODIPINE BESYLATE 5 MG/1
5 TABLET ORAL AT BEDTIME
COMMUNITY
End: 2019-01-01

## 2018-01-01 RX ORDER — ACETAMINOPHEN 325 MG/1
650 TABLET ORAL EVERY 4 HOURS PRN
COMMUNITY
End: 2019-01-01

## 2018-01-01 RX ORDER — SODIUM CHLORIDE 9 MG/ML
1000 INJECTION, SOLUTION INTRAVENOUS CONTINUOUS
Status: DISCONTINUED | OUTPATIENT
Start: 2018-01-01 | End: 2018-01-01

## 2018-01-01 RX ORDER — ONDANSETRON 2 MG/ML
4 INJECTION INTRAMUSCULAR; INTRAVENOUS EVERY 6 HOURS PRN
Status: DISCONTINUED | OUTPATIENT
Start: 2018-01-01 | End: 2018-01-01 | Stop reason: HOSPADM

## 2018-01-01 RX ORDER — ACETAZOLAMIDE 250 MG/1
250 TABLET ORAL 2 TIMES DAILY
Refills: 5 | COMMUNITY
Start: 2018-01-01 | End: 2019-01-01

## 2018-01-01 RX ORDER — POTASSIUM CHLORIDE 7.45 MG/ML
10 INJECTION INTRAVENOUS
Status: DISCONTINUED | OUTPATIENT
Start: 2018-01-01 | End: 2018-01-01 | Stop reason: HOSPADM

## 2018-01-01 RX ORDER — QUETIAPINE FUMARATE 25 MG/1
12.5-25 TABLET, FILM COATED ORAL EVERY 6 HOURS PRN
Status: DISCONTINUED | OUTPATIENT
Start: 2018-01-01 | End: 2018-01-01 | Stop reason: DRUGHIGH

## 2018-01-01 RX ORDER — SODIUM CHLORIDE 450 MG/100ML
INJECTION, SOLUTION INTRAVENOUS CONTINUOUS
Status: DISCONTINUED | OUTPATIENT
Start: 2018-01-01 | End: 2018-01-01 | Stop reason: HOSPADM

## 2018-01-01 RX ORDER — ACETAMINOPHEN 500 MG
500 TABLET ORAL 4 TIMES DAILY
COMMUNITY
End: 2019-01-01

## 2018-01-01 RX ORDER — AMLODIPINE BESYLATE 5 MG/1
5 TABLET ORAL DAILY
Status: DISCONTINUED | OUTPATIENT
Start: 2018-01-01 | End: 2018-01-01 | Stop reason: HOSPADM

## 2018-01-01 RX ORDER — BENZTROPINE MESYLATE 1 MG/1
1-2 TABLET ORAL 3 TIMES DAILY PRN
Status: DISCONTINUED | OUTPATIENT
Start: 2018-01-01 | End: 2018-01-01 | Stop reason: HOSPADM

## 2018-01-01 RX ORDER — LOSARTAN POTASSIUM 100 MG/1
100 TABLET ORAL DAILY
Qty: 93 TABLET | Refills: 3 | Status: SHIPPED | OUTPATIENT
Start: 2018-01-01 | End: 2019-01-01

## 2018-01-01 RX ORDER — AMPICILLIN AND SULBACTAM 2; 1 G/1; G/1
3 INJECTION, POWDER, FOR SOLUTION INTRAMUSCULAR; INTRAVENOUS ONCE
Status: COMPLETED | OUTPATIENT
Start: 2018-01-01 | End: 2018-01-01

## 2018-01-01 RX ORDER — NALOXONE HYDROCHLORIDE 0.4 MG/ML
.1-.4 INJECTION, SOLUTION INTRAMUSCULAR; INTRAVENOUS; SUBCUTANEOUS
Status: DISCONTINUED | OUTPATIENT
Start: 2018-01-01 | End: 2018-01-01 | Stop reason: HOSPADM

## 2018-01-01 RX ORDER — LATANOPROST 50 UG/ML
1 SOLUTION/ DROPS OPHTHALMIC AT BEDTIME
COMMUNITY

## 2018-01-01 RX ORDER — PROCHLORPERAZINE 25 MG
12.5 SUPPOSITORY, RECTAL RECTAL EVERY 12 HOURS PRN
Status: DISCONTINUED | OUTPATIENT
Start: 2018-01-01 | End: 2018-01-01 | Stop reason: HOSPADM

## 2018-01-01 RX ORDER — POTASSIUM CHLORIDE 1500 MG/1
20-40 TABLET, EXTENDED RELEASE ORAL
Status: DISCONTINUED | OUTPATIENT
Start: 2018-01-01 | End: 2018-01-01 | Stop reason: HOSPADM

## 2018-01-01 RX ORDER — LATANOPROST 50 UG/ML
1 SOLUTION/ DROPS OPHTHALMIC AT BEDTIME
Status: DISCONTINUED | OUTPATIENT
Start: 2018-01-01 | End: 2018-01-01 | Stop reason: HOSPADM

## 2018-01-01 RX ORDER — PREDNISOLONE ACETATE 10 MG/ML
1 SUSPENSION/ DROPS OPHTHALMIC DAILY
COMMUNITY

## 2018-01-01 RX ORDER — ONDANSETRON 4 MG/1
4 TABLET, ORALLY DISINTEGRATING ORAL EVERY 6 HOURS PRN
Status: DISCONTINUED | OUTPATIENT
Start: 2018-01-01 | End: 2018-01-01 | Stop reason: HOSPADM

## 2018-01-01 RX ORDER — POTASSIUM CHLORIDE 1500 MG/1
40 TABLET, EXTENDED RELEASE ORAL ONCE
Status: COMPLETED | OUTPATIENT
Start: 2018-01-01 | End: 2018-01-01

## 2018-01-01 RX ORDER — PREDNISOLONE ACETATE 10 MG/ML
1 SUSPENSION/ DROPS OPHTHALMIC DAILY
Status: DISCONTINUED | OUTPATIENT
Start: 2018-01-01 | End: 2018-01-01 | Stop reason: HOSPADM

## 2018-01-01 RX ORDER — ALBUTEROL SULFATE 5 MG/ML
2.5 SOLUTION RESPIRATORY (INHALATION) ONCE
Status: DISCONTINUED | OUTPATIENT
Start: 2018-01-01 | End: 2018-01-01

## 2018-01-01 RX ORDER — MAGNESIUM HYDROXIDE/ALUMINUM HYDROXICE/SIMETHICONE 120; 1200; 1200 MG/30ML; MG/30ML; MG/30ML
30 SUSPENSION ORAL 3 TIMES DAILY PRN
COMMUNITY
End: 2019-01-01

## 2018-01-01 RX ORDER — ACETAMINOPHEN 325 MG/1
650 TABLET ORAL EVERY 4 HOURS PRN
Status: DISCONTINUED | OUTPATIENT
Start: 2018-01-01 | End: 2018-01-01 | Stop reason: HOSPADM

## 2018-01-01 RX ORDER — MORPHINE SULFATE 2 MG/ML
1-2 INJECTION, SOLUTION INTRAMUSCULAR; INTRAVENOUS EVERY 4 HOURS PRN
Status: DISCONTINUED | OUTPATIENT
Start: 2018-01-01 | End: 2018-01-01 | Stop reason: HOSPADM

## 2018-01-01 RX ORDER — BRIMONIDINE TARTRATE AND TIMOLOL MALEATE 2; 5 MG/ML; MG/ML
1 SOLUTION OPHTHALMIC 2 TIMES DAILY
COMMUNITY

## 2018-01-01 RX ORDER — QUETIAPINE FUMARATE 25 MG/1
25 TABLET, FILM COATED ORAL EVERY 6 HOURS PRN
Status: DISCONTINUED | OUTPATIENT
Start: 2018-01-01 | End: 2018-01-01 | Stop reason: HOSPADM

## 2018-01-01 RX ORDER — BRIMONIDINE TARTRATE AND TIMOLOL MALEATE 2; 5 MG/ML; MG/ML
1 SOLUTION OPHTHALMIC 2 TIMES DAILY
Status: DISCONTINUED | OUTPATIENT
Start: 2018-01-01 | End: 2018-01-01 | Stop reason: HOSPADM

## 2018-01-01 RX ADMIN — TAZOBACTAM SODIUM AND PIPERACILLIN SODIUM 3.38 G: 375; 3 INJECTION, SOLUTION INTRAVENOUS at 23:53

## 2018-01-01 RX ADMIN — SODIUM CHLORIDE 500 ML: 900 INJECTION, SOLUTION INTRAVENOUS at 10:30

## 2018-01-01 RX ADMIN — ONDANSETRON 4 MG: 2 INJECTION INTRAMUSCULAR; INTRAVENOUS at 11:54

## 2018-01-01 RX ADMIN — TAZOBACTAM SODIUM AND PIPERACILLIN SODIUM 3.38 G: 375; 3 INJECTION, SOLUTION INTRAVENOUS at 18:09

## 2018-01-01 RX ADMIN — SODIUM CHLORIDE: 450 INJECTION, SOLUTION INTRAVENOUS at 01:23

## 2018-01-01 RX ADMIN — TAZOBACTAM SODIUM AND PIPERACILLIN SODIUM 3.38 G: 375; 3 INJECTION, SOLUTION INTRAVENOUS at 05:43

## 2018-01-01 RX ADMIN — SODIUM CHLORIDE: 900 INJECTION, SOLUTION INTRAVENOUS at 22:00

## 2018-01-01 RX ADMIN — BRIMONIDINE TARTRATE AND TIMOLOL MALEATE 1 DROP: 2; 5 SOLUTION OPHTHALMIC at 09:28

## 2018-01-01 RX ADMIN — ALBUTEROL SULFATE 2.5 MG: 2.5 SOLUTION RESPIRATORY (INHALATION) at 15:43

## 2018-01-01 RX ADMIN — POTASSIUM CHLORIDE 20 MEQ: 1500 TABLET, EXTENDED RELEASE ORAL at 16:00

## 2018-01-01 RX ADMIN — ONDANSETRON 4 MG: 2 INJECTION INTRAMUSCULAR; INTRAVENOUS at 19:42

## 2018-01-01 RX ADMIN — TAZOBACTAM SODIUM AND PIPERACILLIN SODIUM 3.38 G: 375; 3 INJECTION, SOLUTION INTRAVENOUS at 06:05

## 2018-01-01 RX ADMIN — POTASSIUM CHLORIDE 40 MEQ: 1500 TABLET, EXTENDED RELEASE ORAL at 07:50

## 2018-01-01 RX ADMIN — TAZOBACTAM SODIUM AND PIPERACILLIN SODIUM 3.38 G: 375; 3 INJECTION, SOLUTION INTRAVENOUS at 11:47

## 2018-01-01 RX ADMIN — QUETIAPINE 25 MG: 25 TABLET ORAL at 22:45

## 2018-01-01 RX ADMIN — PANTOPRAZOLE SODIUM 40 MG: 40 INJECTION, POWDER, FOR SOLUTION INTRAVENOUS at 11:35

## 2018-01-01 RX ADMIN — BENZTROPINE MESYLATE 1 MG: 1 TABLET ORAL at 15:43

## 2018-01-01 RX ADMIN — POTASSIUM CHLORIDE 40 MEQ: 1500 TABLET, EXTENDED RELEASE ORAL at 21:44

## 2018-01-01 RX ADMIN — SODIUM CHLORIDE: 450 INJECTION, SOLUTION INTRAVENOUS at 19:31

## 2018-01-01 RX ADMIN — AMPICILLIN SODIUM AND SULBACTAM SODIUM 3 G: 2; 1 INJECTION, POWDER, FOR SOLUTION INTRAMUSCULAR; INTRAVENOUS at 16:01

## 2018-01-01 RX ADMIN — SODIUM CHLORIDE: 450 INJECTION, SOLUTION INTRAVENOUS at 00:34

## 2018-01-01 RX ADMIN — TAZOBACTAM SODIUM AND PIPERACILLIN SODIUM 3.38 G: 375; 3 INJECTION, SOLUTION INTRAVENOUS at 18:36

## 2018-01-01 RX ADMIN — SODIUM CHLORIDE: 450 INJECTION, SOLUTION INTRAVENOUS at 17:52

## 2018-01-01 RX ADMIN — TAZOBACTAM SODIUM AND PIPERACILLIN SODIUM 3.38 G: 375; 3 INJECTION, SOLUTION INTRAVENOUS at 05:26

## 2018-01-01 RX ADMIN — BRIMONIDINE TARTRATE AND TIMOLOL MALEATE 1 DROP: 2; 5 SOLUTION OPHTHALMIC at 10:23

## 2018-01-01 RX ADMIN — SODIUM CHLORIDE: 450 INJECTION, SOLUTION INTRAVENOUS at 10:25

## 2018-01-01 RX ADMIN — PANTOPRAZOLE SODIUM 40 MG: 40 INJECTION, POWDER, FOR SOLUTION INTRAVENOUS at 07:52

## 2018-01-01 RX ADMIN — SODIUM CHLORIDE 1000 ML: 900 INJECTION, SOLUTION INTRAVENOUS at 14:23

## 2018-01-01 RX ADMIN — PREDNISOLONE ACETATE 1 DROP: 10 SUSPENSION/ DROPS OPHTHALMIC at 09:31

## 2018-01-01 RX ADMIN — POTASSIUM CHLORIDE 20 MEQ: 1500 TABLET, EXTENDED RELEASE ORAL at 23:50

## 2018-01-01 RX ADMIN — ONDANSETRON 4 MG: 2 INJECTION INTRAMUSCULAR; INTRAVENOUS at 04:06

## 2018-01-01 RX ADMIN — POTASSIUM CHLORIDE 40 MEQ: 1500 TABLET, EXTENDED RELEASE ORAL at 14:07

## 2018-01-01 RX ADMIN — BRIMONIDINE TARTRATE AND TIMOLOL MALEATE 1 DROP: 2; 5 SOLUTION OPHTHALMIC at 21:11

## 2018-01-01 RX ADMIN — ONDANSETRON 4 MG: 2 INJECTION INTRAMUSCULAR; INTRAVENOUS at 21:17

## 2018-01-01 RX ADMIN — LATANOPROST 1 DROP: 50 SOLUTION/ DROPS OPHTHALMIC at 21:12

## 2018-01-01 RX ADMIN — LOSARTAN POTASSIUM 100 MG: 50 TABLET, FILM COATED ORAL at 09:27

## 2018-01-01 RX ADMIN — TAZOBACTAM SODIUM AND PIPERACILLIN SODIUM 3.38 G: 375; 3 INJECTION, SOLUTION INTRAVENOUS at 06:10

## 2018-01-01 RX ADMIN — TAZOBACTAM SODIUM AND PIPERACILLIN SODIUM 3.38 G: 375; 3 INJECTION, SOLUTION INTRAVENOUS at 17:42

## 2018-01-01 RX ADMIN — TAZOBACTAM SODIUM AND PIPERACILLIN SODIUM 3.38 G: 375; 3 INJECTION, SOLUTION INTRAVENOUS at 12:29

## 2018-01-01 RX ADMIN — TAZOBACTAM SODIUM AND PIPERACILLIN SODIUM 3.38 G: 375; 3 INJECTION, SOLUTION INTRAVENOUS at 18:30

## 2018-01-01 RX ADMIN — BENZTROPINE MESYLATE 2 MG: 1 TABLET ORAL at 22:45

## 2018-01-01 RX ADMIN — TAZOBACTAM SODIUM AND PIPERACILLIN SODIUM 3.38 G: 375; 3 INJECTION, SOLUTION INTRAVENOUS at 18:31

## 2018-01-01 RX ADMIN — TAZOBACTAM SODIUM AND PIPERACILLIN SODIUM 3.38 G: 375; 3 INJECTION, SOLUTION INTRAVENOUS at 23:51

## 2018-01-01 RX ADMIN — TAZOBACTAM SODIUM AND PIPERACILLIN SODIUM 3.38 G: 375; 3 INJECTION, SOLUTION INTRAVENOUS at 23:49

## 2018-01-01 RX ADMIN — TAZOBACTAM SODIUM AND PIPERACILLIN SODIUM 3.38 G: 375; 3 INJECTION, SOLUTION INTRAVENOUS at 23:13

## 2018-01-01 RX ADMIN — PREDNISOLONE ACETATE 1 DROP: 10 SUSPENSION/ DROPS OPHTHALMIC at 11:35

## 2018-01-01 RX ADMIN — AMLODIPINE BESYLATE 5 MG: 5 TABLET ORAL at 10:54

## 2018-01-01 RX ADMIN — SODIUM CHLORIDE: 900 INJECTION, SOLUTION INTRAVENOUS at 10:29

## 2018-01-01 RX ADMIN — ONDANSETRON 4 MG: 4 TABLET, ORALLY DISINTEGRATING ORAL at 09:18

## 2018-01-01 RX ADMIN — PREDNISOLONE ACETATE 1 DROP: 10 SUSPENSION/ DROPS OPHTHALMIC at 10:27

## 2018-01-01 RX ADMIN — PANTOPRAZOLE SODIUM 40 MG: 40 INJECTION, POWDER, FOR SOLUTION INTRAVENOUS at 07:51

## 2018-01-01 RX ADMIN — PROCHLORPERAZINE EDISYLATE 5 MG: 5 INJECTION INTRAMUSCULAR; INTRAVENOUS at 15:19

## 2018-01-01 RX ADMIN — PREDNISOLONE ACETATE 1 DROP: 10 SUSPENSION/ DROPS OPHTHALMIC at 10:58

## 2018-01-01 RX ADMIN — LATANOPROST 1 DROP: 50 SOLUTION/ DROPS OPHTHALMIC at 21:35

## 2018-01-01 RX ADMIN — SODIUM CHLORIDE: 450 INJECTION, SOLUTION INTRAVENOUS at 03:52

## 2018-01-01 RX ADMIN — SODIUM CHLORIDE: 450 INJECTION, SOLUTION INTRAVENOUS at 14:06

## 2018-01-01 RX ADMIN — SODIUM CHLORIDE: 450 INJECTION, SOLUTION INTRAVENOUS at 16:26

## 2018-01-01 RX ADMIN — SODIUM CHLORIDE: 450 INJECTION, SOLUTION INTRAVENOUS at 02:52

## 2018-01-01 RX ADMIN — ONDANSETRON 4 MG: 2 INJECTION INTRAMUSCULAR; INTRAVENOUS at 06:00

## 2018-01-01 RX ADMIN — POTASSIUM CHLORIDE 40 MEQ: 1500 TABLET, EXTENDED RELEASE ORAL at 14:12

## 2018-01-01 RX ADMIN — BRIMONIDINE TARTRATE AND TIMOLOL MALEATE 1 DROP: 2; 5 SOLUTION OPHTHALMIC at 11:36

## 2018-01-01 RX ADMIN — SODIUM CHLORIDE 250 ML: 900 INJECTION, SOLUTION INTRAVENOUS at 14:07

## 2018-01-01 RX ADMIN — MORPHINE SULFATE 2 MG: 2 INJECTION, SOLUTION INTRAMUSCULAR; INTRAVENOUS at 22:08

## 2018-01-01 RX ADMIN — IOHEXOL 100 ML: 350 INJECTION, SOLUTION INTRAVENOUS at 14:40

## 2018-01-01 RX ADMIN — POTASSIUM CHLORIDE 20 MEQ: 1500 TABLET, EXTENDED RELEASE ORAL at 08:32

## 2018-01-01 RX ADMIN — ONDANSETRON 4 MG: 2 INJECTION INTRAMUSCULAR; INTRAVENOUS at 05:58

## 2018-01-01 RX ADMIN — QUETIAPINE 25 MG: 25 TABLET ORAL at 15:43

## 2018-01-01 RX ADMIN — ONDANSETRON 4 MG: 2 INJECTION INTRAMUSCULAR; INTRAVENOUS at 13:43

## 2018-01-01 RX ADMIN — BRIMONIDINE TARTRATE AND TIMOLOL MALEATE 1 DROP: 2; 5 SOLUTION OPHTHALMIC at 21:35

## 2018-01-01 RX ADMIN — BRIMONIDINE TARTRATE AND TIMOLOL MALEATE 1 DROP: 2; 5 SOLUTION OPHTHALMIC at 22:45

## 2018-01-01 RX ADMIN — AMLODIPINE BESYLATE 5 MG: 5 TABLET ORAL at 10:24

## 2018-01-01 RX ADMIN — TAZOBACTAM SODIUM AND PIPERACILLIN SODIUM 3.38 G: 375; 3 INJECTION, SOLUTION INTRAVENOUS at 23:59

## 2018-01-01 RX ADMIN — POTASSIUM CHLORIDE 10 MEQ: 10 INJECTION, SOLUTION INTRAVENOUS at 09:48

## 2018-01-01 RX ADMIN — TAZOBACTAM SODIUM AND PIPERACILLIN SODIUM 3.38 G: 375; 3 INJECTION, SOLUTION INTRAVENOUS at 06:23

## 2018-01-01 RX ADMIN — LATANOPROST 1 DROP: 50 SOLUTION/ DROPS OPHTHALMIC at 22:50

## 2018-01-01 RX ADMIN — LOSARTAN POTASSIUM 100 MG: 50 TABLET, FILM COATED ORAL at 10:54

## 2018-01-01 RX ADMIN — POTASSIUM CHLORIDE 10 MEQ: 10 INJECTION, SOLUTION INTRAVENOUS at 07:51

## 2018-01-01 RX ADMIN — POTASSIUM CHLORIDE 20 MEQ: 1500 TABLET, EXTENDED RELEASE ORAL at 12:08

## 2018-01-01 RX ADMIN — LOSARTAN POTASSIUM 100 MG: 50 TABLET, FILM COATED ORAL at 10:24

## 2018-01-01 RX ADMIN — POTASSIUM CHLORIDE 10 MEQ: 10 INJECTION, SOLUTION INTRAVENOUS at 13:47

## 2018-01-01 RX ADMIN — AMLODIPINE BESYLATE 5 MG: 5 TABLET ORAL at 09:27

## 2018-01-01 RX ADMIN — TAZOBACTAM SODIUM AND PIPERACILLIN SODIUM 3.38 G: 375; 3 INJECTION, SOLUTION INTRAVENOUS at 12:53

## 2018-01-01 RX ADMIN — TAZOBACTAM SODIUM AND PIPERACILLIN SODIUM 3.38 G: 375; 3 INJECTION, SOLUTION INTRAVENOUS at 11:35

## 2018-01-01 RX ADMIN — SODIUM CHLORIDE 500 ML: 0.9 INJECTION, SOLUTION INTRAVENOUS at 06:26

## 2018-01-01 RX ADMIN — POTASSIUM CHLORIDE 40 MEQ: 1500 TABLET, EXTENDED RELEASE ORAL at 06:28

## 2018-01-01 RX ADMIN — PANTOPRAZOLE SODIUM 40 MG: 40 INJECTION, POWDER, FOR SOLUTION INTRAVENOUS at 08:32

## 2018-01-01 RX ADMIN — POTASSIUM CHLORIDE 10 MEQ: 10 INJECTION, SOLUTION INTRAVENOUS at 06:36

## 2018-01-01 RX ADMIN — BRIMONIDINE TARTRATE AND TIMOLOL MALEATE 1 DROP: 2; 5 SOLUTION OPHTHALMIC at 10:58

## 2018-01-01 ASSESSMENT — ACTIVITIES OF DAILY LIVING (ADL)
ADLS_ACUITY_SCORE: 29
ADLS_ACUITY_SCORE: 29
COGNITION: 0 - NO COGNITION ISSUES REPORTED
ADLS_ACUITY_SCORE: 31
ADLS_ACUITY_SCORE: 30
ADLS_ACUITY_SCORE: 27
ADLS_ACUITY_SCORE: 31
ADLS_ACUITY_SCORE: 31
WHICH_OF_THE_ABOVE_FUNCTIONAL_RISKS_HAD_A_RECENT_ONSET_OR_CHANGE?: COGNITION
ADLS_ACUITY_SCORE: 28
ADLS_ACUITY_SCORE: 29
ADLS_ACUITY_SCORE: 30
ADLS_ACUITY_SCORE: 29
AMBULATION: 1-->ASSISTIVE EQUIPMENT
TOILETING: 1-->ASSISTIVE EQUIPMENT
ADLS_ACUITY_SCORE: 31
ADLS_ACUITY_SCORE: 29
ADLS_ACUITY_SCORE: 29
ADLS_ACUITY_SCORE: 28
RETIRED_COMMUNICATION: 2-->DIFFICULTY UNDERSTANDING (NOT RELATED TO LANGUAGE BARRIER)
ADLS_ACUITY_SCORE: 31
ADLS_ACUITY_SCORE: 31
DRESS: 1-->ASSISTIVE EQUIPMENT
ADLS_ACUITY_SCORE: 31
ADLS_ACUITY_SCORE: 31
FALL_HISTORY_WITHIN_LAST_SIX_MONTHS: NO
ADLS_ACUITY_SCORE: 27
ADLS_ACUITY_SCORE: 27
ADLS_ACUITY_SCORE: 25
TRANSFERRING: 1-->ASSISTIVE EQUIPMENT
ADLS_ACUITY_SCORE: 31
ADLS_ACUITY_SCORE: 25
RETIRED_EATING: 0-->INDEPENDENT
ADLS_ACUITY_SCORE: 29
ADLS_ACUITY_SCORE: 29
ADLS_ACUITY_SCORE: 25
BATHING: 3-->ASSISTIVE EQUIPMENT AND PERSON
SWALLOWING: 0-->SWALLOWS FOODS/LIQUIDS WITHOUT DIFFICULTY
ADLS_ACUITY_SCORE: 31
ADLS_ACUITY_SCORE: 21

## 2018-01-01 ASSESSMENT — MIFFLIN-ST. JEOR
SCORE: 1041.75
SCORE: 1056.32
SCORE: 1054.75
SCORE: 1059.75
SCORE: 1038.18
SCORE: 1003.25
SCORE: 1066.75
SCORE: 979.21

## 2018-01-01 ASSESSMENT — ENCOUNTER SYMPTOMS
BACK PAIN: 0
CONFUSION: 0
ABDOMINAL PAIN: 1
CHEST TIGHTNESS: 0
HEMATURIA: 0
ADENOPATHY: 0
FEVER: 0
WOUND: 0
CHILLS: 0
BRUISES/BLEEDS EASILY: 0
SHORTNESS OF BREATH: 1

## 2018-01-01 ASSESSMENT — PAIN DESCRIPTION - DESCRIPTORS: DESCRIPTORS: PATIENT UNABLE TO DESCRIBE

## 2018-01-01 ASSESSMENT — PAIN SCALES - GENERAL
PAINLEVEL: NO PAIN (0)
PAINLEVEL: NO PAIN (0)

## 2018-01-03 ENCOUNTER — COMMUNICATION - GICH (OUTPATIENT)
Dept: FAMILY MEDICINE | Facility: OTHER | Age: 83
End: 2018-01-03

## 2018-01-03 DIAGNOSIS — Z98.890 OTHER SPECIFIED POSTPROCEDURAL STATES: ICD-10-CM

## 2018-01-03 NOTE — TELEPHONE ENCOUNTER
Patient Information     Patient Name MRN Karey White [id number] Female 10/17/1924      Telephone Encounter by Suzy Bruce RN at 2017 10:28 AM     Author:  Suzy Bruce RN Service:  (none) Author Type:  NURS- Registered Nurse     Filed:  2017 10:34 AM Encounter Date:  2017 Status:  Signed     :  Suzy Bruce RN (NURS- Registered Nurse)            Vitamin D Over the Counter only    Office visit in the past 12 months or per provider note.    Last visit with OMEGA STAFFORD was on: 2016 in [facility]  Next visit with OMEGA STAFFORD is on: No future appointment listed with this provider  Next visit with Family Practice is on: No future appointment listed in this department    Max refill for 12 months from last office visit or per provider note.    Patient is due for medication management appointment. Limited refill provided at this time and letter sent for reminder to patient. Prescription refilled per RN Medication Refill Policy.................... Suzy Bruce RN ....................  2017   10:30 AM

## 2018-01-03 NOTE — PROGRESS NOTES
Patient Information     Patient Name MRN Sex Karey De La Rosa [id number] Female 10/17/1924      Progress Notes by Byron Huerta MD at 2017 10:15 AM     Author:  Byron Huerta MD Service:  (none) Author Type:  Physician     Filed:  2017  7:39 PM Encounter Date:  2017 Status:  Signed     :  Byron Huerta MD (Physician)            SUBJECTIVE:    Karey Paulson is a 89 y.o. female who presents for physical.    HPI Comments: Patient arrives here for a physical. But also has concerns about nasal congestion leg and arms weak ears are plugged. Symptoms have been going on for about 2-1/2 weeks. She feels she may have come down with the flu. Otherwise she is tolerating her medications well. She currently does not need any refills. We will rule out chart shows that she does need laboratory.      Allergies     Allergen  Reactions     Lipitor [Atorvastatin] Nausea And Vomiting and Myalgia     Lisinopril Cough     Simvastatin Myalgia   ,   Family History      Problem  Relation Age of Onset     Cancer-breast No Family History    ,   Current Outpatient Prescriptions on File Prior to Visit       Medication  Sig Dispense Refill     ALPHAGAN P 0.1 % ophthalmic solution   5     amLODIPine (NORVASC) 10 mg tablet Take 1 tablet by mouth once daily. 90 tablet 3     aspirin 81 mg tablet Take 1 tablet by mouth once daily with a meal.       atenolol (TENORMIN) 25 mg tablet TAKE 1 TABLET BY MOUTH ONCE DAILY. 90 tablet 3     dorzolamide-timolol (COSOPT) 2-0.5 % ophthalmic solution   6     latanoprost (XALATAN) 0.005 % ophthalmic solution   5     LOTEMAX 0.5 % ophthalmic suspension   1     ranitidine (ZANTAC 75) 75 mg tablet Take 1-2 tablets by mouth once daily. 90 tablet 4     sucralfate (CARAFATE) 1 gram tablet Take 1 tablet by mouth 4 times daily before meals and at bedtime. 60 tablet 0     timolol (TIMOPTIC -XE) 0.5 % ophthalmic gel-forming   12     TOBRADEX ophthalmic ointment   0     valsartan (DIOVAN)  80 mg tablet TAKE 1 TABLET BY MOUTH EVERY DAY 90 tablet 2     VITAMIN D 1,000 unit tablet TAKE 1 TABLET BY MOUTH EVERY DAY 90 tablet 0     No current facility-administered medications on file prior to visit.    ,   Past Surgical History       Procedure   Laterality Date     Appendectomy        Appendectomy       Cholecystectomy        Cholecystectomy       Total abdominal hysterectomy        RUEL, ovaries remaining       Mastectomy        R mastectomy       Angioplasty   12-     Angioplasty with 3 bare-metal stents RCA       Cataract removal        bilat     ,   Social History     Substance Use Topics       Smoking status: Never Smoker     Smokeless tobacco: Never Used     Alcohol use No    and   Social History     Substance Use Topics       Smoking status: Never Smoker     Smokeless tobacco: Never Used     Alcohol use No       REVIEW OF SYSTEMS:  Review of Systems   Constitutional: Positive for malaise/fatigue. Negative for chills, fever and weight loss.   HENT:        Congestion   Respiratory: Positive for cough.    Cardiovascular: Negative for chest pain.   Neurological: Positive for weakness.       OBJECTIVE:  /68  Pulse 52  Wt 73 kg (161 lb)  BMI 27.64 kg/m2    EXAM:   Physical Exam   Constitutional: She is well-developed, well-nourished, and in no distress.   HENT:   Mild rhinorrhea bilateral   Eyes: Pupils are equal, round, and reactive to light.   Cardiovascular: Normal rate, regular rhythm and normal heart sounds.    No murmur heard.  Pulmonary/Chest: Effort normal and breath sounds normal.   Abdominal: Soft.   Musculoskeletal: Normal range of motion.   Neurological: She is alert.   Patient is in the wheelchair   Psychiatric: Affect normal.     Results for orders placed or performed in visit on 02/23/17       COMP METABOLIC PANEL       Result  Value Ref Range Status    SODIUM 137 133 - 143 mmol/L Final    POTASSIUM 4.3 3.5 - 5.1 mmol/L Final    CHLORIDE 103 98 - 107 mmol/L Final    CO2,TOTAL 26  21 - 31 mmol/L Final    ANION GAP 8 5 - 18                 Final    GLUCOSE 109 (H) 70 - 105 mg/dL Final    CALCIUM 10.0 8.6 - 10.3 mg/dL Final    BUN 18 7 - 25 mg/dL Final    CREATININE 0.63 (L) 0.70 - 1.30 mg/dL Final    BUN/CREAT RATIO           29                 Final    GFR if African American >60 >60 ml/min/1.73m2 Final    GFR if not African American >60 >60 ml/min/1.73m2 Final    ALBUMIN 3.8 3.5 - 5.7 g/dL Final    PROTEIN,TOTAL 7.3 6.4 - 8.9 g/dL Final    GLOBULIN                  3.5 2.0 - 3.7 g/dL Final    A/G RATIO 1.1 1.0 - 2.0                 Final    BILIRUBIN,TOTAL 0.6 0.3 - 1.0 mg/dL Final    ALK PHOSPHATASE 54 34 - 104 IU/L Final    ALT (SGPT) 14 7 - 52 IU/L Final    AST (SGOT) 16 13 - 39 IU/L Final   CBC WITH AUTO DIFFERENTIAL       Result  Value Ref Range Status    WHITE BLOOD COUNT         8.4 4.5 - 11.0 thou/cu mm Final    RED BLOOD COUNT           5.18 4.00 - 5.20 mil/cu mm Final    HEMOGLOBIN                14.4 12.0 - 16.0 g/dL Final    HEMATOCRIT                45.1 33.0 - 51.0 % Final    MCV                       87 80 - 100 fL Final    MCH                       27.7 26.0 - 34.0 pg Final    MCHC                      31.9 (L) 32.0 - 36.0 g/dL Final    RDW                       14.0 11.5 - 15.5 % Final    PLATELET COUNT            308 140 - 440 thou/cu mm Final    MPV                       7.1 6.5 - 11.0 fL Final    NEUTROPHILS               67.4 42.0 - 72.0 % Final    LYMPHOCYTES               19.3 (L) 20.0 - 44.0 % Final    MONOCYTES                 8.8 <12.0 % Final    EOSINOPHILS               3.6 <8.0 % Final    BASOPHILS                 1.0 <3.0 % Final    ABSOLUTE NEUTROPHILS      5.7 1.7 - 7.0 thou/cu mm Final    ABSOLUTE LYMPHOCYTES      1.6 0.9 - 2.9 thou/cu mm Final    ABSOLUTE MONOCYTES        0.7 <0.9 thou/cu mm Final    ABSOLUTE EOSINOPHILS      0.3 <0.5 thou/cu mm Final    ABSOLUTE BASOPHILS        0.1 <0.3 thou/cu mm Final       ASSESSMENT/PLAN:    ICD-10-CM    1. Physical  exam Z00.00    2. Acute URI J06.9 codeine-guaiFENesin (ROBITUSSIN AC)  mg/5 mL liquid   3. HYPERTENSION I10 CBC AND DIFFERENTIAL      COMP METABOLIC PANEL      CBC AND DIFFERENTIAL      COMP METABOLIC PANEL      CBC WITH AUTO DIFFERENTIAL   4. Elevated blood sugar R73.9    5. Weakness of both lower extremities M62.81 CBC AND DIFFERENTIAL      COMP METABOLIC PANEL      CBC AND DIFFERENTIAL      COMP METABOLIC PANEL      CBC WITH AUTO DIFFERENTIAL   6. Cough R05         Plan:  Laboratory satisfactory. Medications refilled when she needs them. She is given a prescription for Robitussin with codeine. URI likely viral in origin. Follow-up if getting worse. Blood pressure is under good control.

## 2018-01-03 NOTE — TELEPHONE ENCOUNTER
Patient Information     Patient Name MRN Karey White [id number] Female 10/17/1924      Telephone Encounter by Suzy Bruce RN at 3/6/2017  3:21 PM     Author:  Suzy Bruce RN Service:  (none) Author Type:  NURS- Registered Nurse     Filed:  3/6/2017  3:22 PM Encounter Date:  3/6/2017 Status:  Signed     :  Suzy Burce RN (NURS- Registered Nurse)            Angiotensin Receptor Blockers (ARB)    Office visit in the past 12 months or per provider note.    Last visit with OMEGA STAFFORD was on: 2017 in [facility]  Next visit with OMEGA STAFFORD is on: No future appointment listed with this provider  Next visit with Family Practice is on: No future appointment listed in this department    Lab test requirements:  Creatinine and Potassium annually, if ordering lab, order BMP.  CREATININE (mg/dL)    Date Value   2017 0.63 (L)     POTASSIUM (mmol/L)    Date Value   2017 4.3       Max refill for 12 months from last office visit or per provider note    Prescription refilled per RN Medication Refill Policy.................... Suzy Bruce RN ....................  3/6/2017   3:21 PM

## 2018-01-03 NOTE — NURSING NOTE
Patient Information     Patient Name MRN Karey White [id number] Female 10/17/1924      Nursing Note by Poppy Wang at 2017 10:15 AM     Author:  Poppy Wang Service:  (none) Author Type:  (none)     Filed:  2017 10:37 AM Encounter Date:  2017 Status:  Signed     :  Poppy Wang            Patient here for medication review and she has had the flu 2 1/2 weeks ago. She has lingering effects form the flu. Runny nose,  Ears feel plugged achey thighs and arms, cough.She is very tired after the flu. Poppy Wang LPN .......................2017  10:34 AM

## 2018-01-04 NOTE — TELEPHONE ENCOUNTER
Patient Information     Patient Name MRN Karey White [id number] Female 10/17/1924      Telephone Encounter by Suzy Bruce RN at 2017 10:03 AM     Author:  Suzy Bruce RN Service:  (none) Author Type:  NURS- Registered Nurse     Filed:  2017 10:04 AM Encounter Date:  2017 Status:  Signed     :  Suzy Bruce RN (NURS- Registered Nurse)            Vitamin D Over the Counter only    Office visit in the past 12 months or per provider note.    Last visit with OMEGA STAFFORD was on: 2017 in [facility]  Next visit with OMEGA STAFFORD is on: No future appointment listed with this provider  Next visit with Family Practice is on: No future appointment listed in this department    Max refill for 12 months from last office visit or per provider note.    Prescription refilled per RN Medication Refill Policy.................... Suzy Bruce RN ....................  2017   10:03 AM

## 2018-01-05 NOTE — NURSING NOTE
Patient Information     Patient Name MRN Karey White [id number] Female 10/17/1924      Nursing Note by Poppy Wang at 2017  9:30 AM     Author:  Poppy Wang Service:  (none) Author Type:  (none)     Filed:  2017  9:43 AM Encounter Date:  2017 Status:  Signed     :  Poppy Wang            Patient here for blood pressure check with home monitor and she is complaining of fatigue. Poppy Wang LPN .......................2017  9:37 AM

## 2018-01-05 NOTE — PROGRESS NOTES
Patient Information     Patient Name MRN Sex Karey De La Rosa [id number] Female 10/17/1924      Progress Notes by Byron Huerta MD at 2017  9:30 AM     Author:  Byron Huerta MD Service:  (none) Author Type:  Physician     Filed:  2017  1:58 PM Encounter Date:  2017 Status:  Signed     :  Byron Huerta MD (Physician)            SUBJECTIVE:    Karey Paulson is a 89 y.o. female who presents for right arm check and fatigue    HPI Comments: Patient arrives here for right arm check and fatigue. She reports pain in involving the right arm. Also noticed a little bulge. She also reports for 2 weeks she's not been eating. She feels full quickly. His been no weight loss but she feels like she has no energy. She also reports some ear discomfort. No coughing no changes in bowel or bladder habits. She denies a rash. She has not started any new medication. Denies any trauma to her right upper extremity. Patient is also concerned about a bulging vein in her right hand. Nontender. No swelling noted      Allergies      Allergen   Reactions     Lipitor [Atorvastatin]  Nausea And Vomiting and Myalgia     Myalgia      Lisinopril  Cough     Simvastatin  Myalgia     Achey muscles.     ,   Family History      Problem  Relation Age of Onset     Cancer-breast No Family History    ,   Current Outpatient Prescriptions on File Prior to Visit       Medication  Sig Dispense Refill     ALPHAGAN P 0.1 % ophthalmic solution   5     amLODIPine (NORVASC) 10 mg tablet Take 1 tablet by mouth once daily. 90 tablet 3     aspirin 81 mg tablet Take 1 tablet by mouth once daily with a meal.       atenolol (TENORMIN) 25 mg tablet TAKE 1 TABLET BY MOUTH ONCE DAILY. 90 tablet 3     cholecalciferol (VITAMIN D) 1,000 unit tablet Take 1 tablet by mouth once daily. 90 tablet 2     codeine-guaiFENesin (ROBITUSSIN AC)  mg/5 mL liquid Take 5 mL by mouth every 4 hours if needed for Cough. Max dose 60 mL per 24 hrs. 120 mL 3      dorzolamide-timolol (COSOPT) 2-0.5 % ophthalmic solution   6     latanoprost (XALATAN) 0.005 % ophthalmic solution   5     LOTEMAX 0.5 % ophthalmic suspension   1     ranitidine (ZANTAC 75) 75 mg tablet Take 1-2 tablets by mouth once daily. 90 tablet 4     sucralfate (CARAFATE) 1 gram tablet Take 1 tablet by mouth 4 times daily before meals and at bedtime. 60 tablet 0     timolol (TIMOPTIC -XE) 0.5 % ophthalmic gel-forming   12     TOBRADEX ophthalmic ointment   0     valsartan (DIOVAN) 80 mg tablet Take 1 tablet by mouth once daily. 90 tablet 3     No current facility-administered medications on file prior to visit.    ,   Past Surgical History:      Procedure  Laterality Date     ANGIOPLASTY  12-    Angioplasty with 3 bare-metal stents RCA       APPENDECTOMY      Appendectomy       CATARACT REMOVAL      bilat       CHOLECYSTECTOMY      Cholecystectomy       MASTECTOMY      R mastectomy       TOTAL ABDOMINAL HYSTERECTOMY      RUEL, ovaries remaining     ,   Social History     Substance Use Topics       Smoking status: Never Smoker     Smokeless tobacco: Never Used     Alcohol use No    and   Social History     Substance Use Topics       Smoking status: Never Smoker     Smokeless tobacco: Never Used     Alcohol use No       REVIEW OF SYSTEMS:  Review of Systems   Constitutional: Negative for fever.       OBJECTIVE:  /74  Pulse 56  Temp 98.2  F (36.8  C) (Temporal)  Wt 72.6 kg (160 lb)  BMI 27.46 kg/m2    EXAM:   Physical Exam   HENT:   Head: Normocephalic and atraumatic.   Right Ear: External ear normal.   Left Ear: External ear normal.   Eyes: Pupils are equal, round, and reactive to light.   Cardiovascular: Normal rate, regular rhythm and normal heart sounds.    No murmur heard.  Pulmonary/Chest: Effort normal. No respiratory distress. She has no wheezes. She has no rales.   Abdominal: Soft.   Musculoskeletal: Normal range of motion.   Right arm does not show any bulge consistent with a  ruptured tendon.   Neurological: She is alert.   Skin: Skin is warm.   And exam was normal with normal sized veins   Psychiatric: Affect normal.       ASSESSMENT/PLAN:    ICD-10-CM    1. Other fatigue R53.83    2. Right arm pain M79.601         Plan:  Fatigue likely viral in origin. Currently I cannot see any other focus where she required be having fatigue. I did reassure her that the pain is nothing to worry about. I doubt a upper extremity clot. There is no swelling. She has good peripheral pulses. No evidence of cyanosis. I doubt a bicep tendon rupture. Recommended observation. If symptoms should worsen or change follow-up or she should develop any other symptoms.

## 2018-01-16 ENCOUNTER — COMMUNICATION - GICH (OUTPATIENT)
Dept: FAMILY MEDICINE | Facility: OTHER | Age: 83
End: 2018-01-16

## 2018-01-16 DIAGNOSIS — K21.9 GASTRO-ESOPHAGEAL REFLUX DISEASE WITHOUT ESOPHAGITIS: ICD-10-CM

## 2018-01-25 ENCOUNTER — AMBULATORY - GICH (OUTPATIENT)
Dept: SCHEDULING | Facility: OTHER | Age: 83
End: 2018-01-25

## 2018-01-26 VITALS
SYSTOLIC BLOOD PRESSURE: 122 MMHG | SYSTOLIC BLOOD PRESSURE: 120 MMHG | DIASTOLIC BLOOD PRESSURE: 68 MMHG | DIASTOLIC BLOOD PRESSURE: 80 MMHG | HEART RATE: 64 BPM | HEART RATE: 68 BPM | DIASTOLIC BLOOD PRESSURE: 74 MMHG | HEART RATE: 52 BPM | RESPIRATION RATE: 18 BRPM | WEIGHT: 161 LBS | WEIGHT: 157.8 LBS | BODY MASS INDEX: 27.49 KG/M2 | BODY MASS INDEX: 26.94 KG/M2 | SYSTOLIC BLOOD PRESSURE: 152 MMHG

## 2018-01-26 VITALS
DIASTOLIC BLOOD PRESSURE: 60 MMHG | TEMPERATURE: 97.7 F | SYSTOLIC BLOOD PRESSURE: 138 MMHG | DIASTOLIC BLOOD PRESSURE: 70 MMHG | TEMPERATURE: 97.9 F | SYSTOLIC BLOOD PRESSURE: 148 MMHG | WEIGHT: 156.6 LBS | HEART RATE: 76 BPM | HEART RATE: 60 BPM | WEIGHT: 157 LBS

## 2018-01-26 VITALS
HEART RATE: 80 BPM | TEMPERATURE: 97.8 F | DIASTOLIC BLOOD PRESSURE: 80 MMHG | SYSTOLIC BLOOD PRESSURE: 162 MMHG | BODY MASS INDEX: 23.27 KG/M2 | WEIGHT: 144.2 LBS

## 2018-01-26 VITALS
SYSTOLIC BLOOD PRESSURE: 120 MMHG | BODY MASS INDEX: 23.49 KG/M2 | RESPIRATION RATE: 16 BRPM | HEART RATE: 60 BPM | HEART RATE: 56 BPM | WEIGHT: 141 LBS | DIASTOLIC BLOOD PRESSURE: 74 MMHG | WEIGHT: 160 LBS | BODY MASS INDEX: 25.82 KG/M2 | HEIGHT: 65 IN | TEMPERATURE: 98.2 F

## 2018-01-26 VITALS — RESPIRATION RATE: 14 BRPM | DIASTOLIC BLOOD PRESSURE: 70 MMHG | HEART RATE: 68 BPM | SYSTOLIC BLOOD PRESSURE: 124 MMHG

## 2018-01-28 ASSESSMENT — PATIENT HEALTH QUESTIONNAIRE - PHQ9
SUM OF ALL RESPONSES TO PHQ QUESTIONS 1-9: 0

## 2018-02-09 VITALS
DIASTOLIC BLOOD PRESSURE: 70 MMHG | SYSTOLIC BLOOD PRESSURE: 138 MMHG | WEIGHT: 147 LBS | TEMPERATURE: 97 F | HEART RATE: 64 BPM | BODY MASS INDEX: 24.46 KG/M2

## 2018-02-09 VITALS
SYSTOLIC BLOOD PRESSURE: 128 MMHG | WEIGHT: 143 LBS | DIASTOLIC BLOOD PRESSURE: 62 MMHG | BODY MASS INDEX: 23.8 KG/M2 | HEART RATE: 60 BPM

## 2018-02-11 ASSESSMENT — PATIENT HEALTH QUESTIONNAIRE - PHQ9: SUM OF ALL RESPONSES TO PHQ QUESTIONS 1-9: 0

## 2018-02-12 NOTE — TELEPHONE ENCOUNTER
Patient Information     Patient Name MRN Karey White [id number] Female 10/17/1924      Telephone Encounter by Amy Mosquera RN at 2017  8:30 AM     Author:  Amy Mosquera RN Service:  (none) Author Type:  NURS- Registered Nurse     Filed:  2017  8:35 AM Encounter Date:  2017 Status:  Signed     :  Amy Mosquera RN (NURS- Registered Nurse)            Office visit in the past 12 months or per provider note.    Last visit with OMEGA STAFFORD was on: 2017 in [facility]  Next visit with OMEGA STAFFORD is on: No future appointment listed with this provider  Next visit with Family Practice is on: No future appointment listed in this department    Max refill for 12 months from last office visit or per provider note.    Office visit in the past 12 months or per provider note.    Last visit with OMEGA STAFFORD was on: 2017 in [facility]  Next visit with OMEGA STAFFORD is on: No future appointment listed with this provider  Next visit with Family Practice is on: No future appointment listed in this department    Max refill for 12 months from last office visit or per provider note.    Prescription refilled per RN Medication Refill Policy.................... Amy Mosquera RN ....................  2017   8:32 AM

## 2018-02-12 NOTE — TELEPHONE ENCOUNTER
Patient Information     Patient Name MRN Karey White [id number] Female 10/17/1924      Telephone Encounter by Poppy Steve CNA at 2017 12:32 PM     Author:  Poppy Steve CNA Service:  (none) Author Type:  NURS- Nurse Assist/Care Tech     Filed:  2017 12:34 PM Encounter Date:  2017 Status:  Signed     :  Poppy Steve CNA (NURS- Nurse Assist/Care Tech)            This writer received a home care referral for skilled nursing visits. This writer contact [facility] and spoke with the nurse and she states that there are no skilled services needed for home care.     Monica Steve LPN  Clinical Coordinator

## 2018-02-12 NOTE — PROGRESS NOTES
Patient Information     Patient Name MRN Sex Karey De La Rosa [id number] Female 10/17/1924      Progress Notes by Byron Huerta MD at 2017 10:45 AM     Author:  Byron Huerta MD Service:  (none) Author Type:  Physician     Filed:  2017 11:10 AM Encounter Date:  2017 Status:  Signed     :  Byron Huerta MD (Physician)            SUBJECTIVE:    Karey Paulson is a 89 y.o. female who presents for request for skilled nursing    HPI Comments: Patient currently is at the [facility]. Was recently discharged from the nursing home. She was hospitalized for laparoscopic assisted sigmoid colectomy and a colovesical fistula takedown. She is doing fine. States that the nursing home feels that she should have skilled nursing.       Allergies      Allergen   Reactions     Ciprofloxacin  *Unknown     Lipitor [Atorvastatin]  Nausea And Vomiting and Myalgia     Myalgia      Lisinopril  Cough     Simvastatin  Myalgia     Achey muscles.     ,   Family History      Problem  Relation Age of Onset     Cancer-breast No Family History    ,   Current Outpatient Prescriptions on File Prior to Visit       Medication  Sig Dispense Refill     acetaminophen (TYLENOL EXTRA STRGTH) 500 mg tablet Take 1 tablet by mouth 4 times daily. Max acetaminophen dose: 4000mg in 24 hrs.  0     amLODIPine (NORVASC) 10 mg tablet TAKE ONE TABLET BY MOUTH EVERY DAY 90 tablet 1     aspirin 81 mg tablet Take 1 tablet by mouth once daily with a meal.       brimonidine (ALPHAGAN P) 0.1 % ophthalmic solution Place 1 Drop into right eye 3 times daily.       DME Side bed rail 1 unit 0     dorzolamide-timolol (COSOPT) 2-0.5 % ophthalmic solution Place 1 Drop into right eye 3 times daily.       latanoprost (XALATAN) 0.005 % ophthalmic solution Place 1 Drop into right eye at bedtime.       omeprazole (PRILOSEC) 20 mg Delayed-Release capsule TAKE 1 CAPSULE BY MOUTH EVERY DAY 90 capsule 4     omeprazole (PRILOSEC) 20 mg Delayed-Release  capsule Take 1 capsule by mouth once daily before a meal. 10 capsule 0     oxyCODONE (ROXICODONE) 5 mg immediate release tablet Take 0.5 tablets by mouth every 4 hours if needed  for Pain 30 tablet 0     prednisoLONE acetate 1% ophthalmic (ECONOPRED PLUS, PRED FORTE, OMNIPRED) suspension Place 1 Drop into right eye once daily.       ranitidine (ZANTAC) 150 mg tablet TAKE ONE-HALF TO ONE TAB (75-150MG) BY MOUTH ONCE DAILY IF NEEDED  99     ranitidine (ZANTAC) 75 mg tablet Take  mg by mouth once daily if needed.       timolol (TIMOPTIC -XE) 0.5 % ophthalmic gel-forming Place 1 Drop into left eye once daily.       valsartan (DIOVAN) 80 mg tablet Take 1 tablet by mouth once daily. 90 tablet 3     VITAMIN D 1,000 unit tablet TAKE 1 TABLET BY MOUTH ONCE DAILY 90 tablet 2     No current facility-administered medications on file prior to visit.    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     S/P laparoscopic assisted sigmoid colectomy with colovecical fistula takedown Z90.49   ,   Past Surgical History:      Procedure  Laterality Date     ANGIOPLASTY  12-    Angioplasty with 3 bare-metal stents RCA       APPENDECTOMY  1948    Appendectomy       CATARACT REMOVAL  12/2016    bilat       CHOLE[place]STECTOMY  1995    [place]       MASTECTOMY Right 1993    [place]       REPAIR OF COLO-VESICAL FISTULA  10/24/2017    [facility]       TOTAL ABDOMINAL HYSTERECTOMY  1967    RUEL, ovaries remaining     ,   Social History     Substance  Use Topics       Smoking status: Never Smoker     Smokeless tobacco: Never Used     Alcohol use No    and   Social History     Substance Use Topics       Smoking status: Never Smoker     Smokeless tobacco: Never Used     Alcohol use No       REVIEW OF SYSTEMS:  ROS    OBJECTIVE:  /62  Pulse 60  Wt 64.9 kg (143 lb)  BMI 23.8 kg/m2    EXAM:   Physical Exam   Constitutional: She is well-developed, well-nourished, and in no distress.   Patient's wheelchair bound   Eyes: Pupils are equal, round, and reactive to light.   Cardiovascular: Normal rate, regular rhythm and normal heart sounds.    Pulmonary/Chest: Effort normal.   Neurological: She is alert.   Skin: Skin is warm.   Psychiatric: Affect normal.       ASSESSMENT/PLAN:    ICD-10-CM    1. S/P laparoscopic assisted sigmoid colectomy with colovecical fistula takedown Z90.49 AMB CONSULT TO HOME CARE - [facility]        Plan:  Follow-up from recent discharge from nursing home. Patient is doing well and back to the [facility]. Patient is in need of skilled nursing visits for short period of time. She is homebound. Orders written.

## 2018-02-12 NOTE — NURSING NOTE
Patient Information     Patient Name MRN Karey White [id number] Female 10/17/1924      Nursing Note by Veronique Betts at 2017  9:45 AM     Author:  Veronique Betts Service:  (none) Author Type:  (none)     Filed:  2017  9:58 AM Encounter Date:  2017 Status:  Signed     :  Veronique Betts            Patient presents in the clinic with concerns of stomach discomfort after taking her morning medications. Patient states she gets her medication around 7 am and by 10 am her stomach is upset. Patient states this has been going on for a couple of weeks.  Veronique Betts LPN............................ 2017 9:42 AM

## 2018-02-12 NOTE — PROGRESS NOTES
"Patient Information     Patient Name MRN Sex Karey De La Rosa [id number] Female 10/17/1924      Progress Notes by Byron Huerta MD at 2017  9:45 AM     Author:  Byron Huerta MD Service:  (none) Author Type:  Physician     Filed:  2017 11:38 AM Encounter Date:  2017 Status:  Signed     :  Byron Huerta MD (Physician)            SUBJECTIVE:    Karey Paulson is a 89 y.o. female who presents for abdominal pain    HPI Comments: Patient arrives here for abdominal pain. She states it starts about 10 to 10:15 in the morning. She contacts nursing staff that gives her a little \"white pill\". Pain lasts for half an hour to an hour and then resolves. Currently does not have any complaints of changes in bowel or bladder function. Nausea vomiting. Is very consistent when the pain occurs. She is on Prilosec and Zantac but she does not know how often she takes it or when she takes it. She is also on multiple eyedrops. Currently does not have any pain.      Allergies      Allergen   Reactions     Ciprofloxacin  *Unknown     Lipitor [Atorvastatin]  Nausea And Vomiting and Myalgia     Myalgia      Lisinopril  Cough     Simvastatin  Myalgia     Achey muscles.     ,   Family History      Problem  Relation Age of Onset     Cancer-breast No Family History    ,   Current Outpatient Prescriptions on File Prior to Visit       Medication  Sig Dispense Refill     amLODIPine (NORVASC) 10 mg tablet TAKE ONE TABLET BY MOUTH EVERY DAY 90 tablet 1     aspirin (ECOTRIN) 81 mg enteric coated tablet TAKE 1 TABLET BY MOUTH EVERY EVENING 32 tablet 9     brimonidine (ALPHAGAN P) 0.1 % ophthalmic solution Place 1 Drop into right eye 3 times daily.       DME Side bed rail 1 unit 0     dorzolamide-timolol (COSOPT) 2-0.5 % ophthalmic solution Place 1 Drop into right eye 3 times daily.       latanoprost (XALATAN) 0.005 % ophthalmic solution Place 1 Drop into right eye at bedtime.       MAPAP EXTRA STRENGTH 500 mg " tablet TAKE 1 TABLET BY MOUTH FOUR TIMES DAILY (AM,NOON,PM,HS) 129 tablet 9     oxyCODONE (ROXICODONE) 5 mg immediate release tablet Take 0.5 tablets by mouth every 4 hours if needed  for Pain 30 tablet 0     prednisoLONE acetate 1% ophthalmic (ECONOPRED PLUS, PRED FORTE, OMNIPRED) suspension Place 1 Drop into right eye once daily.       timolol (TIMOPTIC -XE) 0.5 % ophthalmic gel-forming Place 1 Drop into left eye once daily.       valsartan (DIOVAN) 80 mg tablet Take 1 tablet by mouth once daily. 90 tablet 3     VITAMIN D 1,000 unit tablet TAKE 1 TABLET BY MOUTH ONCE DAILY 90 tablet 2     No current facility-administered medications on file prior to visit.    ,   Past Surgical History:      Procedure  Laterality Date     ANGIOPLASTY  12-    Angioplasty with 3 bare-metal stents RCA       APPENDECTOMY  1948    Appendectomy       CATARACT REMOVAL  12/2016    bilat       CHOLECYSTECTOMY  1995    [facility]       MASTECTOMY Right 1993    [facility]       REPAIR OF COLO-VESICAL FISTULA  10/24/2017    [facility]       TOTAL ABDOMINAL HYSTERECTOMY  1967    RUEL, ovaries remaining     ,   Social History     Substance Use Topics       Smoking status: Never Smoker     Smokeless tobacco: Never Used     Alcohol use No    and   Social History     Substance Use Topics       Smoking status: Never Smoker     Smokeless tobacco: Never Used     Alcohol use No       REVIEW OF SYSTEMS:  Review of Systems   Gastrointestinal: Positive for abdominal pain. Negative for blood in stool, constipation, diarrhea, heartburn, melena, nausea and vomiting.       OBJECTIVE:  /70 (Cuff Site: Left Arm, Position: Sitting, Cuff Size: Adult Large)  Pulse 64  Temp 97  F (36.1  C) (Tympanic)  Wt 66.7 kg (147 lb)  Breastfeeding? No  BMI 24.46 kg/m2    EXAM:   Physical Exam   Constitutional: She is well-developed, well-nourished, and in no distress.   HENT:   Right Ear: External ear normal.   Left Ear: External ear normal.   Mouth/Throat:  No oropharyngeal exudate.   Eyes: Pupils are equal, round, and reactive to light.   Pulmonary/Chest: Effort normal and breath sounds normal.   Abdominal: Soft. Bowel sounds are normal. She exhibits no distension and no mass. There is no tenderness. There is no rebound and no guarding.   Musculoskeletal:   In the wheelchair   Neurological: She is alert.   Psychiatric: Affect normal.       ASSESSMENT/PLAN:    ICD-10-CM    1. Gastroesophageal reflux disease without esophagitis K21.9 ranitidine (ZANTAC) 150 mg tablet        Plan:  Abdominal pain etiology is unclear. We'll try discontinuing the  Prilosec. Continue Zantac for now. Follow-up if symptoms should worsen.

## 2018-02-12 NOTE — TELEPHONE ENCOUNTER
Patient Information     Patient Name MRN Karey White [id number] Female 10/17/1924      Telephone Encounter by Kelly Conner RN at 2017  9:23 AM     Author:  Kelly Conner RN Service:  (none) Author Type:  NURS- Registered Nurse     Filed:  2017  9:47 AM Encounter Date:  2017 Status:  Signed     :  Kelly Conner RN (NURS- Registered Nurse)            PLEASE REVIEW, SIGN AND SEND AS APPROPRIATE: THANK YOU.    A request came in for a refill Prilosec. Patient was prescribed this medication during an ED visit on 10/21 for abdominal pain, followed by surgery for digestive system. It was a prescription for 10 capsules.    Contacted [facility] and spoke with Taylor in the nursing office and she states that this Patient just returned to their facility from a LTC.     When she looked at readmission orders and chart for this Patient, it shows that Dr. Perla wrote orders for Prilosec OTC, 20 mg 1 tab before meals for abdominal pain and upset.    Note from Pharmacy states they are short for Patient's blister packs at this time.     This is a Refill request from: [facility]  Name of Medication: omeprazole (PRILOSEC)  Quantity requested: 30 caps  Last fill date: 10/21/17  Due for refill: Yes, per pharmacy for filling blister packs  Last visit with BYRON STAFFORD was on: 2017 in [facility]  PCP:  Byron Stafford MD  Controlled Substance Agreement:  N/a   Diagnosis r/t this medication request: GERD without esophagitis     Unable to complete prescription refill per RN Medication Refill Policy.................... Kelly Conner RN ....................  2017   9:41 AM    Vitamin D Over the Counter only    Office visit in the past 12 months or per provider note.    Last visit with BYRON STAFFORD was on: 2017 in [facility]  Next visit with BYRON STAFFORD is on: 2017 in [facility]  Next visit with Family Practice is on:  12/08/2017 in [facility]    Max refill for 12 months from last office visit or per provider note.    Prescription refilled per RN Medication Refill Policy.................... Kelly Conner RN ....................  12/7/2017   9:42 AM

## 2018-02-12 NOTE — NURSING NOTE
Patient Information     Patient Name MRN Karey White [id number] Female 10/17/1924      Nursing Note by Poppy Wang at 2017 10:45 AM     Author:  Poppy Wang Service:  (none) Author Type:  (none)     Filed:  2017 11:02 AM Encounter Date:  2017 Status:  Signed     :  Poppy Wang            Patient here for face to face for skilled nursing care after being at the hospital, ED and the lodge for 1 month. She is now back to [facility]. Poppy Wang LPN .......................2017  11:02 AM

## 2018-02-12 NOTE — TELEPHONE ENCOUNTER
Patient Information     Patient Name MRN Karey White [id number] Female 10/17/1924      Telephone Encounter by Byron Huerta MD at 1/3/2018  8:35 AM     Author:  Byron Huerta MD Service:  (none) Author Type:  Physician     Filed:  1/3/2018  8:36 AM Encounter Date:  1/3/2018 Status:  Signed     :  Byron Huerta MD (Physician)            Should be seen

## 2018-02-12 NOTE — TELEPHONE ENCOUNTER
"Patient Information     Patient Name MRN Karey White [id number] Female 10/17/1924      Telephone Encounter by Kelly Conner RN at 1/3/2018  8:04 AM     Author:  Kelly Conner RN Service:  (none) Author Type:  NURS- Registered Nurse     Filed:  1/3/2018  8:17 AM Encounter Date:  1/3/2018 Status:  Signed     :  Kelly Conner RN (NURS- Registered Nurse)            PLEASE REVIEW, SIGN AND SEND AS APPROPRIATE: THANK YOU.    Note from Pharmacy: \"Her care facility is looking for oxycodone. Can you please let us know when an Rx is ready to be picked up?\"    This is a Refill request from: [facility]  Name of Medication: oxycodone (ROXICODONE)  Quantity requested: 30 tabs  Last fill date: 10/30/2017 (#30, R-0)  Due for refill: Yes  Last visit with BYRON HUERTA was on: 2017 in [facility]  PCP:  Byron Huerta MD  Controlled Substance Agreement:  None for this medication  Diagnosis r/t this medication request: Postoperative State    Additional note from [facility]:  7.5 tabs sent home, staples removed to abdomen 1117 at f/u appt, discharge to [facility].     Unable to complete prescription refill per RN Medication Refill Policy.................... Kelly Conner RN ....................  1/3/2018   8:11 AM          "

## 2018-02-12 NOTE — TELEPHONE ENCOUNTER
Patient Information     Patient Name MRN Karey White [id number] Female 10/17/1924      Telephone Encounter by Kelly Conner RN at 2017  9:31 AM     Author:  Kelly Conner RN Service:  (none) Author Type:  NURS- Registered Nurse     Filed:  2017  9:32 AM Encounter Date:  2017 Status:  Signed     :  Kelly Conner RN (NURS- Registered Nurse)            Error-please disregard. Kelly Conner RN .............. 2017  9:32 AM

## 2018-02-13 NOTE — TELEPHONE ENCOUNTER
Patient Information     Patient Name MRN Karey White [id number] Female 10/17/1924      Telephone Encounter by Emigdio Figueroa RN at 2018  8:50 AM     Author:  Emigdio Figueroa RN Service:  (none) Author Type:  NURS- Registered Nurse     Filed:  2018  9:02 AM Encounter Date:  2018 Status:  Signed     :  Emigdio Figueroa RN (NURS- Registered Nurse)            H2 Blockers    Office visit in the past 12 months or per provider note.    Last visit with OMEGA HUERTA was on: 2017 in [facility]  Next visit with OMEGA HUERTA is on: No future appointment listed with this provider  Next visit with Family Practice is on: No future appointment listed in this department    Max refill for 12 months from last office visit or per provider note.    Chart review shows that patient was seen by PCP last on 17. Office visit notes on that date indicate that patient was to continue on her zantac as ordered, with rx written as noted below:    Prescribing Provider: Omega Huerta MD                 Order Date: 2017  Ordered by: OMEGA HUERTA  Medication:ranitidine (ZANTAC) 150 mg tablet    Qty:120 tablet  Ref:5  Start:2017  End:              Route:Oral                Class:Print    Sig:Take 1 tablet by mouth 2 times daily.    Pharmacy:[facility] - [place] [address]    However, rx as written above was printed and not Erx'd. Writer will resend rx as requested to [email or web address] via erx at this time.    Prescription refilled per RN Medication Refill Policy.................... Emigdio Figueroa RN ....................  2018   9:00 AM

## 2018-02-13 NOTE — TELEPHONE ENCOUNTER
Patient Information     Patient Name MRN Karey White [id number] Female 10/17/1924      Telephone Encounter by Kelly Conner RN at 1/3/2018 10:16 AM     Author:  Kelly Conner RN Service:  (none) Author Type:  NURS- Registered Nurse     Filed:  1/3/2018 10:18 AM Encounter Date:  1/3/2018 Status:  Signed     :  Kelly Conner RN (NURS- Registered Nurse)            Spoke with Taylor at [facility] and she states this refill request was sent over automatically and Patient has not been using the medication, so has plenty. We can disregard at this time. Refill request refused.      Unable to complete prescription refill per RN Medication Refill Policy.................... Kelly Conner RN ....................  1/3/2018   10:17 AM

## 2018-02-20 DIAGNOSIS — I10 ESSENTIAL HYPERTENSION: Primary | ICD-10-CM

## 2018-02-20 RX ORDER — VALSARTAN 80 MG/1
80 TABLET ORAL DAILY
COMMUNITY
Start: 2017-03-06 | End: 2018-02-20

## 2018-02-21 RX ORDER — VALSARTAN 80 MG/1
80 TABLET ORAL DAILY
Qty: 90 TABLET | Refills: 3 | Status: SHIPPED | OUTPATIENT
Start: 2018-02-21 | End: 2018-05-08

## 2018-02-21 NOTE — TELEPHONE ENCOUNTER
Routing refill request to provider for review/approval because:  Labs out of range:  Creatinine (0.51 on 11/17/17)  Due for refill. Unable to fill per RN refill protocol.   Kelly Mccray RN on 2/21/2018 at 9:59 AM

## 2018-03-09 ENCOUNTER — TRANSFERRED RECORDS (OUTPATIENT)
Dept: HEALTH INFORMATION MANAGEMENT | Facility: OTHER | Age: 83
End: 2018-03-09

## 2018-03-13 ENCOUNTER — OFFICE VISIT (OUTPATIENT)
Dept: FAMILY MEDICINE | Facility: OTHER | Age: 83
End: 2018-03-13
Attending: NURSE PRACTITIONER
Payer: MEDICARE

## 2018-03-13 ENCOUNTER — DOCUMENTATION ONLY (OUTPATIENT)
Dept: FAMILY MEDICINE | Facility: OTHER | Age: 83
End: 2018-03-13

## 2018-03-13 VITALS — TEMPERATURE: 97.7 F | SYSTOLIC BLOOD PRESSURE: 140 MMHG | HEART RATE: 64 BPM | DIASTOLIC BLOOD PRESSURE: 80 MMHG

## 2018-03-13 DIAGNOSIS — R41.3 MEMORY IMPAIRMENT: ICD-10-CM

## 2018-03-13 DIAGNOSIS — N39.0 URINARY TRACT INFECTION WITHOUT HEMATURIA, SITE UNSPECIFIED: ICD-10-CM

## 2018-03-13 DIAGNOSIS — R30.0 DYSURIA: Primary | ICD-10-CM

## 2018-03-13 PROBLEM — K57.30 DIVERTICULOSIS OF LARGE INTESTINE: Status: ACTIVE | Noted: 2017-10-22

## 2018-03-13 PROBLEM — Z90.49 S/P LAPAROSCOPIC COLECTOMY: Status: ACTIVE | Noted: 2017-11-14

## 2018-03-13 PROBLEM — I10 ESSENTIAL HYPERTENSION: Status: ACTIVE | Noted: 2018-03-13

## 2018-03-13 PROBLEM — K86.2 PANCREATIC CYST: Status: ACTIVE | Noted: 2017-10-20

## 2018-03-13 PROBLEM — N32.1 COLO-VESICAL FISTULA: Status: ACTIVE | Noted: 2017-10-21

## 2018-03-13 PROBLEM — K56.699 STRICTURE OF SIGMOID COLON (H): Status: ACTIVE | Noted: 2017-10-22

## 2018-03-13 PROBLEM — R55 SYNCOPE: Status: ACTIVE | Noted: 2017-10-26

## 2018-03-13 LAB
ALBUMIN UR-MCNC: NEGATIVE MG/DL
ANION GAP SERPL CALCULATED.3IONS-SCNC: 5 MMOL/L (ref 3–14)
APPEARANCE UR: ABNORMAL
BACTERIA #/AREA URNS HPF: ABNORMAL /HPF
BASOPHILS # BLD AUTO: 0.1 10E9/L (ref 0–0.2)
BASOPHILS NFR BLD AUTO: 0.9 %
BILIRUB UR QL STRIP: NEGATIVE
BUN SERPL-MCNC: 24 MG/DL (ref 7–25)
CALCIUM SERPL-MCNC: 9.8 MG/DL (ref 8.6–10.3)
CHLORIDE SERPL-SCNC: 108 MMOL/L (ref 98–107)
CO2 SERPL-SCNC: 24 MMOL/L (ref 21–31)
COLOR UR AUTO: YELLOW
CREAT SERPL-MCNC: 0.63 MG/DL (ref 0.6–1.2)
DIFFERENTIAL METHOD BLD: ABNORMAL
EOSINOPHIL # BLD AUTO: 0.2 10E9/L (ref 0–0.7)
EOSINOPHIL NFR BLD AUTO: 2 %
ERYTHROCYTE [DISTWIDTH] IN BLOOD BY AUTOMATED COUNT: 15.9 % (ref 10–15)
GFR SERPL CREATININE-BSD FRML MDRD: 88 ML/MIN/1.7M2
GLUCOSE SERPL-MCNC: 102 MG/DL (ref 70–105)
GLUCOSE UR STRIP-MCNC: NEGATIVE MG/DL
HCT VFR BLD AUTO: 38.7 % (ref 35–47)
HGB BLD-MCNC: 12.4 G/DL (ref 11.7–15.7)
HGB UR QL STRIP: NEGATIVE
IMM GRANULOCYTES # BLD: 0 10E9/L (ref 0–0.4)
IMM GRANULOCYTES NFR BLD: 0.4 %
KETONES UR STRIP-MCNC: NEGATIVE MG/DL
LEUKOCYTE ESTERASE UR QL STRIP: ABNORMAL
LYMPHOCYTES # BLD AUTO: 1.3 10E9/L (ref 0.8–5.3)
LYMPHOCYTES NFR BLD AUTO: 15.4 %
MCH RBC QN AUTO: 27.6 PG (ref 26.5–33)
MCHC RBC AUTO-ENTMCNC: 32 G/DL (ref 31.5–36.5)
MCV RBC AUTO: 86 FL (ref 78–100)
MONOCYTES # BLD AUTO: 0.6 10E9/L (ref 0–1.3)
MONOCYTES NFR BLD AUTO: 7.1 %
NEUTROPHILS # BLD AUTO: 6.3 10E9/L (ref 1.6–8.3)
NEUTROPHILS NFR BLD AUTO: 74.2 %
NITRATE UR QL: POSITIVE
PH UR STRIP: 7 PH (ref 5–7)
PLATELET # BLD AUTO: 266 10E9/L (ref 150–450)
POTASSIUM SERPL-SCNC: 3.5 MMOL/L (ref 3.5–5.1)
RBC # BLD AUTO: 4.49 10E12/L (ref 3.8–5.2)
RBC #/AREA URNS AUTO: ABNORMAL /HPF
SODIUM SERPL-SCNC: 137 MMOL/L (ref 134–144)
SOURCE: ABNORMAL
SP GR UR STRIP: 1.01 (ref 1–1.03)
UROBILINOGEN UR STRIP-ACNC: 1 EU/DL (ref 0.2–1)
WBC # BLD AUTO: 8.4 10E9/L (ref 4–11)
WBC #/AREA URNS AUTO: ABNORMAL /HPF

## 2018-03-13 PROCEDURE — G0463 HOSPITAL OUTPT CLINIC VISIT: HCPCS | Mod: 25

## 2018-03-13 PROCEDURE — 85025 COMPLETE CBC W/AUTO DIFF WBC: CPT | Performed by: NURSE PRACTITIONER

## 2018-03-13 PROCEDURE — 87088 URINE BACTERIA CULTURE: CPT | Performed by: NURSE PRACTITIONER

## 2018-03-13 PROCEDURE — 36415 COLL VENOUS BLD VENIPUNCTURE: CPT | Performed by: NURSE PRACTITIONER

## 2018-03-13 PROCEDURE — 51798 US URINE CAPACITY MEASURE: CPT | Performed by: NURSE PRACTITIONER

## 2018-03-13 PROCEDURE — G0463 HOSPITAL OUTPT CLINIC VISIT: HCPCS

## 2018-03-13 PROCEDURE — 99214 OFFICE O/P EST MOD 30 MIN: CPT | Performed by: NURSE PRACTITIONER

## 2018-03-13 PROCEDURE — 81001 URINALYSIS AUTO W/SCOPE: CPT | Performed by: NURSE PRACTITIONER

## 2018-03-13 PROCEDURE — 87086 URINE CULTURE/COLONY COUNT: CPT | Performed by: NURSE PRACTITIONER

## 2018-03-13 PROCEDURE — 80048 BASIC METABOLIC PNL TOTAL CA: CPT | Performed by: NURSE PRACTITIONER

## 2018-03-13 RX ORDER — ACETAZOLAMIDE 250 MG/1
250 TABLET ORAL 2 TIMES DAILY
Refills: 0 | COMMUNITY
Start: 2018-03-07 | End: 2018-10-12

## 2018-03-13 RX ORDER — CEFUROXIME AXETIL 250 MG/1
250 TABLET ORAL 2 TIMES DAILY
Qty: 20 TABLET | Refills: 0 | Status: SHIPPED | OUTPATIENT
Start: 2018-03-13 | End: 2018-03-15 | Stop reason: ALTCHOICE

## 2018-03-13 RX ORDER — BRIMONIDINE TARTRATE 1 MG/ML
1 SOLUTION/ DROPS OPHTHALMIC
COMMUNITY
End: 2018-06-11

## 2018-03-13 RX ORDER — CHOLECALCIFEROL (VITAMIN D3) 25 MCG
TABLET ORAL
Refills: 0 | COMMUNITY
Start: 2017-12-06

## 2018-03-13 RX ORDER — DORZOLAMIDE HYDROCHLORIDE AND TIMOLOL MALEATE 20; 5 MG/ML; MG/ML
SOLUTION/ DROPS OPHTHALMIC
Refills: 5 | COMMUNITY
Start: 2018-01-31 | End: 2018-03-26

## 2018-03-13 RX ORDER — AMLODIPINE BESYLATE 10 MG/1
10 TABLET ORAL
COMMUNITY
Start: 2017-09-27 | End: 2018-03-17

## 2018-03-13 RX ORDER — BRIMONIDINE TARTRATE, TIMOLOL MALEATE 2; 5 MG/ML; MG/ML
SOLUTION/ DROPS OPHTHALMIC
Refills: 12 | Status: ON HOLD | COMMUNITY
Start: 2018-03-01 | End: 2018-01-01

## 2018-03-13 RX ORDER — TIMOLOL MALEATE 5 MG/ML
1 SOLUTION OPHTHALMIC
COMMUNITY
End: 2018-10-12

## 2018-03-13 RX ORDER — LATANOPROST 50 UG/ML
SOLUTION/ DROPS OPHTHALMIC
Refills: 12 | Status: ON HOLD | COMMUNITY
Start: 2018-01-18 | End: 2018-01-01

## 2018-03-13 RX ORDER — ASPIRIN 81 MG
TABLET, DELAYED RELEASE (ENTERIC COATED) ORAL
Refills: 0 | COMMUNITY
Start: 2017-12-06 | End: 2018-10-01

## 2018-03-13 ASSESSMENT — PAIN SCALES - GENERAL: PAINLEVEL: NO PAIN (0)

## 2018-03-13 NOTE — PATIENT INSTRUCTIONS
Monitor urine output for a few days to make sure she is emptying completely--no need to wake up at night for this    Push clear fluids    Will start ceftin 250 mg BID x 10 days--pending culture result---sent to [facility]    followup as needed

## 2018-03-13 NOTE — MR AVS SNAPSHOT
"              After Visit Summary   3/13/2018    Karey Paulson    MRN: [id number]           Patient Information     Date Of Birth          10/17/1924        Visit Information        Provider Department      3/13/2018 9:15 AM Kandice Willson APRN CNP [facility]        Today's Diagnoses     Dysuria    -  1    Urinary tract infection without hematuria, site unspecified          Care Instructions    Monitor urine output for a few days to make sure she is emptying completely--no need to wake up at night for this    Push clear fluids    Will start ceftin 250 mg BID x 10 days--pending culture result---sent to [place]    followup as needed          Follow-ups after your visit        Follow-up notes from your care team     Return if symptoms worsen or fail to improve.      Who to contact     If you have questions or need follow up information about today's clinic visit or your schedule please contact [facility] directly at [phone].  Normal or non-critical lab and imaging results will be communicated to you by [employer], letter or phone within 4 business days after the clinic has received the results. If you do not hear from us within 7 days, please contact the clinic through [employer] or phone. If you have a critical or abnormal lab result, we will notify you by phone as soon as possible.  Submit refill requests through [employer] or call your pharmacy and they will forward the refill request to us. Please allow 3 business days for your refill to be completed.          Additional Information About Your Visit        [employer] Information     [employer] lets you send messages to your doctor, view your test results, renew your prescriptions, schedule appointments and more. To sign up, go to [email or web address] . Click on \"Log in\" on the left side of the screen, which will take you to the Welcome page. Then click on \"Sign up Now\" on the right side of the page.     You will be asked to " enter the access code listed below, as well as some personal information. Please follow the directions to create your username and password.     Your access code is: [email or web address]  Expires: 2018 11:50 AM     Your access code will  in 90 days. If you need help or a new code, please call your [facility] clinic or [phone].        Care EveryWhere ID     This is your Care EveryWhere ID. This could be used by other organizations to access your [facility] medical records  [id number]        Your Vitals Were     Pulse Temperature                64 97.7  F (36.5  C) (Temporal)           Blood Pressure from Last 3 Encounters:   18 140/80   17 138/70   17 128/62    Weight from Last 3 Encounters:   17 147 lb (66.7 kg)   17 143 lb (64.9 kg)   17 141 lb (64 kg)              We Performed the Following     Basic metabolic panel  (Ca, Cl, CO2, Creat, Gluc, K, Na, BUN)     CBC with platelets and differential     UA reflex to Microscopic     Urine Culture Aerobic Bacterial     Urine Microscopic          Today's Medication Changes          These changes are accurate as of 3/13/18 11:50 AM.  If you have any questions, ask your nurse or doctor.               Start taking these medicines.        Dose/Directions    cefuroxime 250 MG tablet   Commonly known as:  CEFTIN   Used for:  Urinary tract infection without hematuria, site unspecified   Started by:  Kandice Willson APRN CNP        Dose:  250 mg   Take 1 tablet (250 mg) by mouth 2 times daily   Quantity:  20 tablet   Refills:  0            Where to get your medicines      These medications were sent to [facility] - [place], [place] - [address]  [address], [facility] [address]     Phone:  [phone]     cefuroxime 250 MG tablet                Primary Care Provider Office Phone # Fax #    Byron Huerta [phone]       [address]  [facility] [address]        Equal  Access to Services     [facility]: Hadii aad ku hadhughhilda Ezekielali, wazacariasda luqadaha, qaybta kasanjuanacharlie wong. So [facility] [phone].    ATENCIÓN: Si habla shanta, tiene a garza disposición servicios gratuitos de asistencia lingüística. Llame al [phone].    We comply with applicable federal civil rights laws and [place] laws. We do not discriminate on the basis of race, color, national origin, age, disability, sex, sexual orientation, or gender identity.            Thank you!     Thank you for choosing [facility] hospitals  for your care. Our goal is always to provide you with excellent care. Hearing back from our patients is one way we can continue to improve our services. Please take a few minutes to complete the written survey that you may receive in the mail after your visit with us. Thank you!             Your Updated Medication List - Protect others around you: Learn how to safely use, store and throw away your medicines at www.disposemymeds.org.          This list is accurate as of 3/13/18 11:50 AM.  Always use your most recent med list.                   Brand Name Dispense Instructions for use Diagnosis    acetaZOLAMIDE 250 MG tablet    DIAMOX     Take 250 mg by mouth 2 times daily        amLODIPine 10 MG tablet    NORVASC     Take 10 mg by mouth        ASPIRIN LOW DOSE 81 MG EC tablet   Generic drug:  aspirin      TAKE 1 TABLET BY MOUTH EVERY EVENING        brimonidine 0.1 % ophthalmic solution    ALPHAGAN P     1 drop        cefuroxime 250 MG tablet    CEFTIN    20 tablet    Take 1 tablet (250 mg) by mouth 2 times daily    Urinary tract infection without hematuria, site unspecified       cholecalciferol 1000 UNIT tablet    vitamin D3     TAKE 1 TABLET BY MOUTH ONCE DAILY (IN THE MORNING)        COMBIGAN 0.2-0.5 % ophthalmic solution   Generic drug:  brimonidine-timolol      PLACE 1 DROP IN THE LEFT EYE TWICE DAILY        dorzolamide-timolol  2-0.5 % ophthalmic solution    COSOPT     INSTILL 1 DROP IN RIGHT EYE THREE TIMES DAILY        latanoprost 0.005 % ophthalmic solution    XALATAN     PLACE 1 DROP IN THE RIGHT EYE NIGHTLY        MAPAP 500 MG tablet   Generic drug:  acetaminophen      TAKE 1 TABLET BY MOUTH FOUR TIMES DAILY AS NEEDED        RANITIDINE HCL PO      Take 150 mg by mouth daily        timolol 0.5 % ophthalmic gel-form    TIMOPTIC-XE     1 drop        valsartan 80 MG tablet    DIOVAN    90 tablet    Take 1 tablet (80 mg) by mouth daily    Essential hypertension

## 2018-03-13 NOTE — NURSING NOTE
Patient is here today with her grandson. She resides at [facility]. She has a history of UTI's and has been more confused lately. Breanna Cuellar LPN......................3/13/2018 9:40 AM

## 2018-03-14 ASSESSMENT — PATIENT HEALTH QUESTIONNAIRE - PHQ9: SUM OF ALL RESPONSES TO PHQ QUESTIONS 1-9: 0

## 2018-03-15 DIAGNOSIS — N39.0 URINARY TRACT INFECTION WITHOUT HEMATURIA, SITE UNSPECIFIED: Primary | ICD-10-CM

## 2018-03-15 LAB
BACTERIA SPEC CULT: ABNORMAL
SPECIMEN SOURCE: ABNORMAL

## 2018-03-15 RX ORDER — DOXYCYCLINE 100 MG/1
100 CAPSULE ORAL 2 TIMES DAILY
Qty: 14 CAPSULE | Refills: 0 | Status: SHIPPED | OUTPATIENT
Start: 2018-03-15 | End: 2019-01-01

## 2018-03-17 DIAGNOSIS — I10 ESSENTIAL HYPERTENSION: Primary | ICD-10-CM

## 2018-03-17 ASSESSMENT — ENCOUNTER SYMPTOMS
CONFUSION: 1
DIFFICULTY URINATING: 1

## 2018-03-17 NOTE — PROGRESS NOTES
SUBJECTIVE:   Karey Paulson is a 89 year old female presenting with a chief complaint of   Chief Complaint   Patient presents with     UTI     recurrent UTI's, confusion   Presents with her grandson from [facility] for confusion yesterday staff thought perhaps she had another UTI.  There is been no fever, there is been no nausea vomiting or diarrhea, she has been taking meals normally.  She is wheelchair dependent however her grandson reports this is her baseline.  Her grandson does not see a major change from baseline.  She currently lives in assisted living however medications are set up and administered by staff.    Review of Systems   Genitourinary: Positive for difficulty urinating.   Psychiatric/Behavioral: Positive for confusion.   All other systems reviewed and are negative.      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     2017     Acute myocardial infarction     2010     Cyst of pancreas     10/20/2017,Cystic nodules, multiple; s/p CT Abd Pelv     Diaphragmatic hernia without obstruction or gangrene     10/20/2017,s/p CT Abd/Pelv     Diverticulosis of large intestine without perforation or abscess without bleeding     10/22/2017,S/p fle sig, 10/22/2017     Edema     2011     Epigastric pain     2015     Essential (primary) hypertension     No Comments Provided     Gastro-esophageal reflux disease without esophagitis     2015     Osteoarthritis     2011     Other intestinal obstruction unspecified as to partial versus complete obstruction (CODE)     10/22/2017,S/p flex sig 10/22/2017     Personal history of malignant neoplasm of breast     Questionable 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     Personal history of other medical treatment (CODE)          Postmenopausal atrophic vaginitis     2012     Sepsis (H)     2017,E coli bacteremia; E coli UTI (resistant to Cipro)     Urinary tract infection      6/30/2017,Resistant to cipro     Urinary tract infection     10/21/2017,2 in 2017, 10/13/2017 and 6/28/2017     Family History   Problem Relation Age of Onset     Breast Cancer No family hx of      Cancer-breast     Current Outpatient Prescriptions   Medication Sig Dispense Refill     amLODIPine (NORVASC) 10 MG tablet Take 10 mg by mouth       RANITIDINE HCL PO Take 150 mg by mouth daily       doxycycline (VIBRAMYCIN) 100 MG capsule Take 1 capsule (100 mg) by mouth 2 times daily for 7 days 14 capsule 0     MAPAP 500 MG tablet TAKE 1 TABLET BY MOUTH FOUR TIMES DAILY AS NEEDED  0     acetaZOLAMIDE (DIAMOX) 250 MG tablet Take 250 mg by mouth 2 times daily  0     ASPIRIN LOW DOSE 81 MG EC tablet TAKE 1 TABLET BY MOUTH EVERY EVENING  0     brimonidine (ALPHAGAN P) 0.1 % ophthalmic solution 1 drop       COMBIGAN 0.2-0.5 % ophthalmic solution PLACE 1 DROP IN THE LEFT EYE TWICE DAILY  12     VITAMIN D3 1000 UNITS tablet TAKE 1 TABLET BY MOUTH ONCE DAILY (IN THE MORNING)  0     dorzolamide-timolol (COSOPT) 2-0.5 % ophthalmic solution INSTILL 1 DROP IN RIGHT EYE THREE TIMES DAILY  5     latanoprost (XALATAN) 0.005 % ophthalmic solution PLACE 1 DROP IN THE RIGHT EYE NIGHTLY  12     timolol (TIMOPTIC-XE) 0.5 % ophthalmic gel-form 1 drop       valsartan (DIOVAN) 80 MG tablet Take 1 tablet (80 mg) by mouth daily 90 tablet 3     Social History   Substance Use Topics     Smoking status: Never Smoker     Smokeless tobacco: Never Used     Alcohol use No       OBJECTIVE  /80 (BP Location: Left arm, Patient Position: Sitting, Cuff Size: Adult Regular)  Pulse 64  Temp 97.7  F (36.5  C) (Temporal)    Physical Exam   Constitutional: She appears well-developed and well-nourished.   HENT:   Head: Normocephalic and atraumatic.   Cardiovascular: Normal rate.    Pulmonary/Chest: Effort normal.   Abdominal: Soft. Bowel sounds are normal.   Bladder scan completed--260 mL postvoid residual    Her 3 attempts able to get patient to provide  urine specimen 150 cc   Musculoskeletal:   Wheelchair dependent   Neurological: She is alert.   Appropriately conversational and pleasant, oriented to self and place   Skin: Skin is warm and dry.   Psychiatric: She has a normal mood and affect. Her behavior is normal. Judgment and thought content normal.   Nursing note and vitals reviewed.      Labs:   Results for orders placed or performed in visit on 03/13/18   UA reflex to Microscopic   Result Value Ref Range    Color Urine Yellow     Appearance Urine Cloudy     Glucose Urine Negative NEG^Negative mg/dL    Bilirubin Urine Negative NEG^Negative    Ketones Urine Negative NEG^Negative mg/dL    Specific Gravity Urine 1.010 1.003 - 1.035    Blood Urine Negative NEG^Negative    pH Urine 7.0 5.0 - 7.0 pH    Protein Albumin Urine Negative NEG^Negative mg/dL    Urobilinogen Urine 1.0 0.2 - 1.0 EU/dL    Nitrite Urine Positive (A) NEG^Negative    Leukocyte Esterase Urine Trace (A) NEG^Negative    Source Unspecified Urine    Basic metabolic panel  (Ca, Cl, CO2, Creat, Gluc, K, Na, BUN)   Result Value Ref Range    Sodium 137 134 - 144 mmol/L    Potassium 3.5 3.5 - 5.1 mmol/L    Chloride 108 (H) 98 - 107 mmol/L    Carbon Dioxide 24 21 - 31 mmol/L    Anion Gap 5 3 - 14 mmol/L    Glucose 102 70 - 105 mg/dL    Urea Nitrogen 24 7 - 25 mg/dL    Creatinine 0.63 0.60 - 1.20 mg/dL    GFR Estimate 88 >60 mL/min/1.7m2    GFR Estimate If Black >90 >60 mL/min/1.7m2    Calcium 9.8 8.6 - 10.3 mg/dL   CBC with platelets and differential   Result Value Ref Range    WBC 8.4 4.0 - 11.0 10e9/L    RBC Count 4.49 3.8 - 5.2 10e12/L    Hemoglobin 12.4 11.7 - 15.7 g/dL    Hematocrit 38.7 35.0 - 47.0 %    MCV 86 78 - 100 fl    MCH 27.6 26.5 - 33.0 pg    MCHC 32.0 31.5 - 36.5 g/dL    RDW 15.9 (H) 10.0 - 15.0 %    Platelet Count 266 150 - 450 10e9/L    Diff Method Automated Method     % Neutrophils 74.2 %    % Lymphocytes 15.4 %    % Monocytes 7.1 %    % Eosinophils 2.0 %    % Basophils 0.9 %    %  Immature Granulocytes 0.4 %    Absolute Neutrophil 6.3 1.6 - 8.3 10e9/L    Absolute Lymphocytes 1.3 0.8 - 5.3 10e9/L    Absolute Monocytes 0.6 0.0 - 1.3 10e9/L    Absolute Eosinophils 0.2 0.0 - 0.7 10e9/L    Absolute Basophils 0.1 0.0 - 0.2 10e9/L    Abs Immature Granulocytes 0.0 0 - 0.4 10e9/L   Urine Microscopic   Result Value Ref Range    WBC Urine 0 - 5 OTO5^0 - 5 /HPF    RBC Urine O - 2 OTO2^O - 2 /HPF    Bacteria Urine Many (A) NEG^Negative /HPF   Urine Culture Aerobic Bacterial   Result Value Ref Range    Specimen Description Unspecified Urine     Culture Micro >100,000 colonies/mL  Citrobacter braakii   (A)        Susceptibility    Citrobacter braakii - BRICE     AMPICILLIN >16 Resistant ug/mL     AZTREONAM >16 Resistant ug/mL     CEFEPIME <=8 Sensitive ug/mL     CEFTRIAXONE >32 Resistant ug/mL     CEFTAZIDIME >16 Resistant ug/mL     CEFOTAXIME 32 Intermediate ug/mL     CIPROFLOXACIN <=1 Sensitive ug/mL     CEFUROXIME >16 Resistant ug/mL     NITROFURANTOIN <=32 Sensitive ug/mL     GENTAMICIN <=4 Sensitive ug/mL     IMIPENEM <=1 Sensitive ug/mL     LEVOFLOXACIN <=2 Sensitive ug/mL     TETRACYCLINE <=4 Sensitive ug/mL     Trimethoprim/Sulfa <=2/38 Sensitive ug/mL               ASSESSMENT:      ICD-10-CM    1. Dysuria R30.0 UA reflex to Microscopic     Basic metabolic panel  (Ca, Cl, CO2, Creat, Gluc, K, Na, BUN)     CBC with platelets and differential     Urine Culture Aerobic Bacterial     Urine Microscopic   2. Urinary tract infection without hematuria, site unspecified N39.0 DISCONTINUED: cefuroxime (CEFTIN) 250 MG tablet   3. Memory impairment R41.3       CBC and BMP normal, patient is very pleasant appropriately conversational provides good insight into purpose of visit    Since UA is positive will treat with antibiotics since there has been some degree of change noted in behavior however I suspect she is chronically colonized  And I would discourage staff from checking urine unless 3 symptoms of  UTI    PLAN: Treat with Ceftin---  Changed antibiotic to doxycycline for resistance    Called granddaughter to discuss assessment and treatment--who is very agreeable    Did speak with Gwen MCNULTY at [facility]--- she is aware of assessment and treatment plan  And will provide monitoring and follow-up if indicated  Discussed urine output monitoring for the next few days to make sure she is emptying completely  Bladder scan device is not available at nursing home            Followup:    If not improving or if condition worsens, follow up with your Primary Care Provider    Patient Instructions   Monitor urine output for a few days to make sure she is emptying completely--no need to wake up at night for this    Push clear fluids    Will start ceftin 250 mg BID x 10 days--pending culture result---sent to [facility]    followup as needed

## 2018-03-22 RX ORDER — AMLODIPINE BESYLATE 10 MG/1
TABLET ORAL
Qty: 90 TABLET | Refills: 3 | Status: SHIPPED | OUTPATIENT
Start: 2018-03-22 | End: 2018-10-12

## 2018-03-23 ENCOUNTER — TELEPHONE (OUTPATIENT)
Dept: FAMILY MEDICINE | Facility: OTHER | Age: 83
End: 2018-03-23

## 2018-03-23 NOTE — TELEPHONE ENCOUNTER
[facility] is sending a letter regarding Karey possibly needing glaucoma surgery.    Digna Gordon on 3/23/2018 at 11:57 AM

## 2018-03-26 ENCOUNTER — OFFICE VISIT (OUTPATIENT)
Dept: FAMILY MEDICINE | Facility: OTHER | Age: 83
End: 2018-03-26
Payer: MEDICARE

## 2018-03-26 VITALS
SYSTOLIC BLOOD PRESSURE: 148 MMHG | WEIGHT: 140 LBS | DIASTOLIC BLOOD PRESSURE: 78 MMHG | HEART RATE: 64 BPM | BODY MASS INDEX: 23.3 KG/M2

## 2018-03-26 DIAGNOSIS — R29.898 WEAKNESS OF BOTH LOWER EXTREMITIES: ICD-10-CM

## 2018-03-26 DIAGNOSIS — N39.0 RECURRENT UTI: Primary | ICD-10-CM

## 2018-03-26 DIAGNOSIS — H40.051 BORDERLINE GLAUCOMA OF RIGHT EYE WITH OCULAR HYPERTENSION: ICD-10-CM

## 2018-03-26 PROCEDURE — 99215 OFFICE O/P EST HI 40 MIN: CPT | Performed by: FAMILY MEDICINE

## 2018-03-26 PROCEDURE — G0463 HOSPITAL OUTPT CLINIC VISIT: HCPCS

## 2018-03-26 ASSESSMENT — PAIN SCALES - GENERAL: PAINLEVEL: NO PAIN (0)

## 2018-03-26 NOTE — MR AVS SNAPSHOT
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  Normal or non-critical lab and imaging results will be communicated to you by [employer], letter or phone within 4 business days after the clinic has received the results. If you do not hear from us within 7 days, please contact the clinic through [employer] or phone. If you have a critical or abnormal lab result, we will notify you by phone as soon as possible.  Submit refill requests through [employer] or call your pharmacy and they will forward the refill request to us. Please allow 3 business days for your refill to be completed.          Additional Information About Your Visit        MyChart Information     [employer] lets you send messages to your doctor, view your test results, renew your prescriptions, schedule appointments and more. To sign up, go to [email or web address] . Click on \"Log in\" on the left side of the screen, which will take you to the Welcome page. Then click on \"Sign up Now\" on the right side of the page.     You will be asked to enter the access code listed below, as well as some personal information. Please follow the directions to create your username and password.     Your access code is: [email or web address]  Expires: 6/11/2018 11:50 AM     Your access " code will  in 90 days. If you need help or a new code, please call your [facility] clinic or [phone].        Care EveryWhere ID     This is your Care EveryWhere ID. This could be used by other organizations to access your [facility] medical records  [id number]        Your Vitals Were     Pulse BMI (Body Mass Index)                64 23.3 kg/m2           Blood Pressure from Last 3 Encounters:   18 148/78   18 140/80   17 138/70    Weight from Last 3 Encounters:   18 140 lb (63.5 kg)   17 147 lb (66.7 kg)   17 143 lb (64.9 kg)              We Performed the Following     PHYSICAL THERAPY REFERRAL          Today's Medication Changes          These changes are accurate as of 3/26/18 11:59 PM.  If you have any questions, ask your nurse or doctor.               Stop taking these medicines if you haven't already. Please contact your care team if you have questions.     dorzolamide-timolol 2-0.5 % ophthalmic solution   Commonly known as:  COSOPT   Stopped by:  Byron Huerta MD                    Primary Care Provider Office Phone # Fax #    Byron Huerta [phone] [address] [facility] [address]        Equal Access to Services     [facility] F[facility] Kennedy Memorial HospitalLISA AH: Hadsalome coelloo Solizbeth, waaxda luqadaha, qaybta kaalmada adeegyada, charlie pruitt. So [facility] [phone].    ATENCIÓN: Si habla español, tiene a garza disposición servicios gratuitos de asistencia lingüística. Llwes al [phone].    We comply with applicable federal civil rights laws and [place] laws. We do not discriminate on the basis of race, color, national origin, age, disability, sex, sexual orientation, or gender identity.            Thank you!     Thank you for choosing [facility]  for your care. Our goal is always to provide you with excellent care. Hearing back from our patients is one way we can continue to improve our  services. Please take a few minutes to complete the written survey that you may receive in the mail after your visit with us. Thank you!             Your Updated Medication List - Protect others around you: Learn how to safely use, store and throw away your medicines at www.disposemymeds.org.          This list is accurate as of 3/26/18 11:59 PM.  Always use your most recent med list.                   Brand Name Dispense Instructions for use Diagnosis    acetaZOLAMIDE 250 MG tablet    DIAMOX     Take 250 mg by mouth 2 times daily        amLODIPine 10 MG tablet    NORVASC    90 tablet    TAKE 1 TABLET BY MOUTH ONCE DAILY    Essential hypertension       ASPIRIN LOW DOSE 81 MG EC tablet   Generic drug:  aspirin      TAKE 1 TABLET BY MOUTH EVERY EVENING        brimonidine 0.1 % ophthalmic solution    ALPHAGAN P     1 drop        cholecalciferol 1000 UNIT tablet    vitamin D3     TAKE 1 TABLET BY MOUTH ONCE DAILY (IN THE MORNING)        COMBIGAN 0.2-0.5 % ophthalmic solution   Generic drug:  brimonidine-timolol      PLACE 1 DROP IN THE LEFT EYE TWICE DAILY        latanoprost 0.005 % ophthalmic solution    XALATAN     PLACE 1 DROP IN THE RIGHT EYE NIGHTLY        MAPAP 500 MG tablet   Generic drug:  acetaminophen      TAKE 1 TABLET BY MOUTH FOUR TIMES DAILY AS NEEDED        RANITIDINE HCL PO      Take 150 mg by mouth daily        timolol 0.5 % ophthalmic gel-form    TIMOPTIC-XE     1 drop        valsartan 80 MG tablet    DIOVAN    90 tablet    Take 1 tablet (80 mg) by mouth daily    Essential hypertension

## 2018-03-26 NOTE — NURSING NOTE
Patient here for medication follow up, UTI follow up which she says is clear having no symptoms. She was to see  for the left eye for glaucoma surgery with local anesthesia and light sedation. Poppy Wang LPN .......................3/26/2018  12:55 PM

## 2018-03-27 PROBLEM — R29.898 WEAKNESS OF BOTH LOWER EXTREMITIES: Status: ACTIVE | Noted: 2018-03-27

## 2018-03-27 PROBLEM — R55 SYNCOPE: Status: RESOLVED | Noted: 2017-10-26 | Resolved: 2018-03-27

## 2018-03-27 ASSESSMENT — PATIENT HEALTH QUESTIONNAIRE - PHQ9: SUM OF ALL RESPONSES TO PHQ QUESTIONS 1-9: 0

## 2018-03-27 NOTE — PROGRESS NOTES
SUBJECTIVE:   Karey Paulson is a 89 year old female who presents to clinic today for the following health issues: 26/2018Medication follow-up    HPI Comments: Patient arrives here for medication follow-up.  I also received a note from Dr. Mendoza concerning her medical condition.  Whether she could undergo glaucoma surgery with local anesthesia and light sedation.  Reports that currently they are trying drops.  She does have a follow-up appointment with him in a week or 2 to see how the drops are working.  She also has concerns about lower extremity weakness.  She was recently in [facility] and reports that she had a terrible time.  She has had problems with weakness in the past and has undergone physical therapy with some good results.  She recently was transferred to the [facility].  She was hospitalized and transferred to [facility] because of a severe UTI.  She is currently not having any complaints or concerns or symptoms.        Patient Active Problem List    Diagnosis Date Noted     Weakness of both lower extremities 03/27/2018     Priority: Medium     Essential hypertension 03/13/2018     Priority: Medium     S/P laparoscopic colectomy 11/14/2017     Priority: Medium     Diverticulosis of large intestine 10/22/2017     Priority: Medium     Overview:   S/p fle sig, 10/22/2017       Stricture of sigmoid colon 10/22/2017     Priority: Medium     Overview:   S/p flex sig 10/22/2017       [facility]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     Priority: Medium     Overview:        Recurrent UTI 10/21/2017     Priority: Medium     Overview:   2 in 2017, 10/13/2017 and 6/28/2017       Pancreatic cyst 10/20/2017     Priority: Medium     Overview:   Cystic nodules, multiple; s/p CT Abd Pelv       Gastroesophageal reflux disease without esophagitis 12/31/2015     Priority: Medium     ACP (advance care planning) 12/05/2013     Priority: Medium     Backache 02/24/2013     Priority: Medium     Actinic keratosis 04/18/2012      Priority: Medium     Osteoarthrosis 2011     Priority: Medium     Hyperlipidemia 2009     Priority: Medium     Overview:   Overview:   IMO Update 10/11       Borderline glaucoma with ocular hypertension 2007     Priority: Medium     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     2017     Acute myocardial infarction     2010     Cyst of pancreas     10/20/2017,Cystic nodules, multiple; s/p CT Abd Pelv     Diaphragmatic hernia without obstruction or gangrene     10/20/2017,s/p CT Abd/Pelv     Diverticulosis of large intestine without perforation or abscess without bleeding     10/22/2017,S/p fle sig, 10/22/2017     Edema     2011     Epigastric pain     2015     Essential (primary) hypertension     No Comments Provided     Gastro-esophageal reflux disease without esophagitis     2015     Osteoarthritis     2011     Other intestinal obstruction unspecified as to partial versus complete obstruction (CODE)     10/22/2017,S/p flex sig 10/22/2017     Personal history of malignant neoplasm of breast     Questionable 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     Personal history of other medical treatment (CODE)          Postmenopausal atrophic vaginitis     2012     Sepsis (H)     2017,E coli bacteremia; E coli UTI (resistant to Cipro)     Urinary tract infection     2017,Resistant to cipro     Urinary tract infection     10/21/2017,2 in , 10/13/2017 and 2017      Past Surgical History:   Procedure Laterality Date     ANGIOPLASTY      2010,Angioplasty with 3 bare-metal stents RCA     APPENDECTOMY OPEN      ,Appendectomy     CHOLECYSTECTOMY      ,[place]     EXTRACAPSULAR CATARACT EXTRATION WITH INTRAOCULAR LENS IMPLANT      2016,bilat     HYSTERECTOMY TOTAL ABDOMINAL      ,[place], ovaries remaining     MASTECTOMY SIMPLE      ,[place]     OTHER SURGICAL HISTORY       10/24/2017,[id number],OTHER,Ellen Arambula and Jairo     Social History     Social History Narrative    ; living @  [facility]-moved to [facility] after surgery 10/2017.  1 son passed away at 66.     Current Outpatient Prescriptions   Medication Sig Dispense Refill     amLODIPine (NORVASC) 10 MG tablet TAKE 1 TABLET BY MOUTH ONCE DAILY 90 tablet 3     MAPAP 500 MG tablet TAKE 1 TABLET BY MOUTH FOUR TIMES DAILY AS NEEDED  0     acetaZOLAMIDE (DIAMOX) 250 MG tablet Take 250 mg by mouth 2 times daily  0     ASPIRIN LOW DOSE 81 MG EC tablet TAKE 1 TABLET BY MOUTH EVERY EVENING  0     brimonidine (ALPHAGAN P) 0.1 % ophthalmic solution 1 drop       COMBIGAN 0.2-0.5 % ophthalmic solution PLACE 1 DROP IN THE LEFT EYE TWICE DAILY  12     VITAMIN D3 1000 UNITS tablet TAKE 1 TABLET BY MOUTH ONCE DAILY (IN THE MORNING)  0     latanoprost (XALATAN) 0.005 % ophthalmic solution PLACE 1 DROP IN THE RIGHT EYE NIGHTLY  12     timolol (TIMOPTIC-XE) 0.5 % ophthalmic gel-form 1 drop       RANITIDINE HCL PO Take 150 mg by mouth daily       valsartan (DIOVAN) 80 MG tablet Take 1 tablet (80 mg) by mouth daily 90 tablet 3     Allergies   Allergen Reactions     Atorvastatin Other (See Comments) and Nausea and Vomiting     Myalgia     Ciprofloxacin Other (See Comments)     Erythromycin      Lisinopril Cough     Simvastatin Other (See Comments)     Achey muscles.      Sulfamethoxazole W/Trimethoprim Unknown     Nausea and shaking       Review of Systems     OBJECTIVE:     /78 (BP Location: Left arm, Patient Position: Sitting)  Pulse 64  Wt 140 lb (63.5 kg)  BMI 23.3 kg/m2  Body mass index is 23.3 kg/(m^2).  Physical Exam   Constitutional: She appears well-developed.   Patient is in the wheelchair   Neck: Normal range of motion.   Cardiovascular: Normal rate and regular rhythm.    Pulmonary/Chest: Effort normal and breath sounds normal.   Abdominal: Soft. She exhibits no distension. There is no tenderness. There is  no rebound and no guarding.   Neurological: She is alert.   Skin: Skin is warm.   Psychiatric: She has a normal mood and affect.       none     ASSESSMENT/PLAN:           ICD-10-CM    1. Recurrent UTI N39.0    2. 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 Weakness of both lower extremities R29.898 PHYSICAL THERAPY REFERRAL     I did advise the patient I felt that she could proceed with glaucoma surgery.  I also discussed this with Dr. Mendoza's office.  Her weakness appears to be fairly significant.  Physical therapy consult it.  Currently asymptomatic concerning her urinary system.  Monitor for now.      Documentation of Face-to-Face and Certification for Home Health Services     Patient: Karey Paulson   YOB: 1924  MR Number: [id number]  Today's Date: 3/29/2018 and glaucoma    I certify that patient: Karey Paulson is under my care and that I, or a nurse practitioner or physician's assistant working with me, had a face-to-face encounter that meets the physician face-to-face encounter requirements with this patient on: 03/26/2018    This encounter with the patient was in whole, or in part, for the following medical condition, which is the primary reason for home health care: Extreme weakness.  Glaucoma  I certify that, based on my findings, the following services are medically necessary home health services: Nursing and Physical Therapy.    My clinical findings support the need for the above services because: Physical Therapy Services are needed to assess and treat the following functional impairments: Weakness.    Further, I certify that my clinical findings support that this patient is homebound (i.e. absences from home require considerable and taxing effort and are for medical reasons or [facility] services or infrequently or of short duration when for other reasons) because: Requires assistance of another person or specialized equipment to access medical services  because patient: Is unable to exit home safely on own due to: Weakness...    Based on the above findings. I certify that this patient is confined to the home and needs intermittent skilled nursing care, physical therapy and/or speech therapy.  The patient is under my care, and I have initiated the establishment of the plan of care.  This patient will be followed by a physician who will periodically review the plan of care.  Physician/Provider to provide follow up care: Byron Huerta    Physician Signature: See electronic signature associated with these discharge orders.  Date: 3/29/2018  Byron Huerta MD  [facility]

## 2018-03-30 ENCOUNTER — TELEPHONE (OUTPATIENT)
Dept: FAMILY MEDICINE | Facility: OTHER | Age: 83
End: 2018-03-30

## 2018-03-30 NOTE — TELEPHONE ENCOUNTER
URGENT: Response needed within 24 hours    This timeframe is established by CMS as  best practice  for the delivery of home health care. The home health clinician may need to contact you again if this timeframe is not met.      S:    Medication reconciliation discrepancies and/or drug interactions/contraindications have been identified.  Home Care s drug regime review has revealed significant medication issues.    B:    You are being contacted for orders related to medication issues.     A:    Interaction report    Moderate:  Acetazolamide and Aspirin Low Dose  Acetazolamide and Combigan  Acetazolamide and Timolol        R:    Please evaluate this information and indicate below whether or not changes are required. A copy of the patient's drug interaction/contraindications report is available upon request.       OK TO CONTINUE MEDICATIONS?    Thank you for your time. Please call with questions or concerns.      Sarah Apodaca RN on 3/30/2018 at 2:04 PM

## 2018-04-02 ENCOUNTER — TELEPHONE (OUTPATIENT)
Dept: FAMILY MEDICINE | Facility: OTHER | Age: 83
End: 2018-04-02

## 2018-04-02 NOTE — TELEPHONE ENCOUNTER
"Request for Home Care Physical Therapy orders as follows:    PT eval and treat date:  3/30/18    1 x 1 week then     Continuation frequency =  2 x week x __4__ weeks              Effective date = 3/30/18    Interventions include:    Therapeutic Exercise  Gait/Balance/Dysfunction  Home Exercise Program  Home Safety Assessment      Acknowledging this order with an \"OK\" will suffice. Thank you for your time.  Please call if you have any questions or concerns.    Therapist: Breanna Melendez PT  "

## 2018-04-11 ENCOUNTER — OFFICE VISIT (OUTPATIENT)
Dept: FAMILY MEDICINE | Facility: OTHER | Age: 83
End: 2018-04-11
Attending: FAMILY MEDICINE
Payer: MEDICARE

## 2018-04-11 VITALS
SYSTOLIC BLOOD PRESSURE: 130 MMHG | HEART RATE: 60 BPM | DIASTOLIC BLOOD PRESSURE: 78 MMHG | WEIGHT: 137.6 LBS | BODY MASS INDEX: 22.9 KG/M2

## 2018-04-11 DIAGNOSIS — R29.898 WEAKNESS OF BOTH LOWER EXTREMITIES: ICD-10-CM

## 2018-04-11 DIAGNOSIS — M19.91 PRIMARY OSTEOARTHRITIS, UNSPECIFIED SITE: ICD-10-CM

## 2018-04-11 DIAGNOSIS — F51.01 PRIMARY INSOMNIA: Primary | ICD-10-CM

## 2018-04-11 PROCEDURE — G0180 MD CERTIFICATION HHA PATIENT: HCPCS | Performed by: FAMILY MEDICINE

## 2018-04-11 PROCEDURE — 99213 OFFICE O/P EST LOW 20 MIN: CPT | Performed by: FAMILY MEDICINE

## 2018-04-11 PROCEDURE — G0463 HOSPITAL OUTPT CLINIC VISIT: HCPCS

## 2018-04-11 RX ORDER — LANOLIN ALCOHOL/MO/W.PET/CERES
3 CREAM (GRAM) TOPICAL
Qty: 90 TABLET | Refills: 3 | Status: SHIPPED | OUTPATIENT
Start: 2018-04-11 | End: 2018-07-19

## 2018-04-11 RX ORDER — BRINZOLAMIDE/BRIMONIDINE TARTRATE 10; 2 MG/ML; MG/ML
SUSPENSION/ DROPS OPHTHALMIC
Refills: 3 | COMMUNITY
Start: 2018-04-09 | End: 2018-01-01

## 2018-04-11 RX ORDER — PREDNISOLONE ACETATE 10 MG/ML
SUSPENSION/ DROPS OPHTHALMIC
Refills: 3 | COMMUNITY
Start: 2018-04-09 | End: 2018-01-01

## 2018-04-11 ASSESSMENT — PAIN SCALES - GENERAL: PAINLEVEL: NO PAIN (0)

## 2018-04-11 NOTE — NURSING NOTE
Patient is here for concerns of insomnia. States has been having trouble sleeping since recent issues and [place] and [place].  Lucía Mac LPN .............4/11/2018     1:09 PM

## 2018-04-11 NOTE — PROGRESS NOTES
SUBJECTIVE:   Karey Paulson is a 89 year old female who presents to clinic today for the following health issues: Insomnia home health certification and plan of care from 3/30/2018 to 5/28/2018.  HPI Comments: Patient arrives here for insomnia.  She states is been going on for some time.  Couple months.  She can fall asleep but has difficulty staying asleep.  States that she is wide awake in the middle of the night.  I also have a home health certification and plan of care.  Patient is seen home care for home safety risk for falls.  Medication renewal.  She is doing very well.        Patient Active Problem List    Diagnosis Date Noted     Primary insomnia 04/11/2018     Priority: Medium     Weakness of both lower extremities 03/27/2018     Priority: Medium     Essential hypertension 03/13/2018     Priority: Medium     S/P laparoscopic colectomy 11/14/2017     Priority: Medium     Diverticulosis of large intestine 10/22/2017     Priority: Medium     Overview:   S/p fle sig, 10/22/2017       Stricture of sigmoid colon 10/22/2017     Priority: Medium     Overview:   S/p flex sig 10/22/2017       [place]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     Priority: Medium     Overview:        Recurrent UTI 10/21/2017     Priority: Medium     Overview:   2 in 2017, 10/13/2017 and 6/28/2017       Pancreatic cyst 10/20/2017     Priority: Medium     Overview:   Cystic nodules, multiple; s/p CT Abd Pelv       Gastroesophageal reflux disease without esophagitis 12/31/2015     Priority: Medium     ACP (advance care planning) 12/05/2013     Priority: Medium     Backache 02/24/2013     Priority: Medium     Actinic keratosis 04/18/2012     Priority: Medium     Osteoarthrosis 11/08/2011     Priority: Medium     Hyperlipidemia 01/29/2009     Priority: Medium     Overview:   Overview:   IMO Update 10/11       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     Priority: Medium     Past Medical History:   Diagnosis Date     Acquired absence of  other specified parts of digestive tract     2017     Acute myocardial infarction     2010     Cyst of pancreas     10/20/2017,Cystic nodules, multiple; s/p CT Abd Pelv     Diaphragmatic hernia without obstruction or gangrene     10/20/2017,s/p CT Abd/Pelv     Diverticulosis of large intestine without perforation or abscess without bleeding     10/22/2017,S/p fle sig, 10/22/2017     Edema     2011     Epigastric pain     2015     Essential (primary) hypertension     No Comments Provided     Gastro-esophageal reflux disease without esophagitis     2015     Osteoarthritis     2011     Other intestinal obstruction unspecified as to partial versus complete obstruction (CODE)     10/22/2017,S/p flex sig 10/22/2017     Personal history of malignant neoplasm of breast     Questionable 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     Personal history of other medical treatment (CODE)          Postmenopausal atrophic vaginitis     2012     Sepsis (H)     2017,E coli bacteremia; E coli UTI (resistant to Cipro)     Urinary tract infection     2017,Resistant to cipro     Urinary tract infection     10/21/2017,2 in , 10/13/2017 and 2017      Past Surgical History:   Procedure Laterality Date     ANGIOPLASTY      2010,Angioplasty with 3 bare-metal stents RCA     APPENDECTOMY OPEN      ,Appendectomy     CHOLECYSTECTOMY      ,[place]     EXTRACAPSULAR CATARACT EXTRATION WITH INTRAOCULAR LENS IMPLANT      2016,bilat     HYSTERECTOMY TOTAL ABDOMINAL      ,RUEL, ovaries remaining     MASTECTOMY SIMPLE      ,[place]     OTHER SURGICAL HISTORY      10/24/2017,[id number],OTHER,Ellen Arambula and Jairo     Social History     Social History Narrative    ; living @  [facility]-moved to [facility] after surgery 10/2017.  1 son passed away at 66.     Current Outpatient Prescriptions   Medication Sig Dispense Refill     SIMBRINZA 1-0.2 % ophthalmic suspension  PLACE 1 DROP IN THE RIGHT EYE THREE TIMES DAILY  3     prednisoLONE acetate (PRED FORTE) 1 % ophthalmic susp PLACE 1 DROP IN THE LEFT EYE FOUR TIMES DAILY  3     melatonin 3 MG tablet Take 1 tablet (3 mg) by mouth nightly as needed for sleep 90 tablet 3     amLODIPine (NORVASC) 10 MG tablet TAKE 1 TABLET BY MOUTH ONCE DAILY 90 tablet 3     MAPAP 500 MG tablet TAKE 1 TABLET BY MOUTH FOUR TIMES DAILY AS NEEDED  0     acetaZOLAMIDE (DIAMOX) 250 MG tablet Take 250 mg by mouth 2 times daily  0     ASPIRIN LOW DOSE 81 MG EC tablet TAKE 1 TABLET BY MOUTH EVERY EVENING  0     brimonidine (ALPHAGAN P) 0.1 % ophthalmic solution 1 drop       VITAMIN D3 1000 UNITS tablet TAKE 1 TABLET BY MOUTH ONCE DAILY (IN THE MORNING)  0     latanoprost (XALATAN) 0.005 % ophthalmic solution PLACE 1 DROP IN THE RIGHT EYE NIGHTLY  12     timolol (TIMOPTIC-XE) 0.5 % ophthalmic gel-form 1 drop       RANITIDINE HCL PO Take 150 mg by mouth daily       valsartan (DIOVAN) 80 MG tablet Take 1 tablet (80 mg) by mouth daily 90 tablet 3     COMBIGAN 0.2-0.5 % ophthalmic solution PLACE 1 DROP IN THE LEFT EYE TWICE DAILY  12     Allergies   Allergen Reactions     Atorvastatin Other (See Comments) and Nausea and Vomiting     Myalgia     Ciprofloxacin Other (See Comments)     Erythromycin      Lisinopril Cough     Simvastatin Other (See Comments)     Achey muscles.      Sulfamethoxazole W/Trimethoprim Unknown     Nausea and shaking       Review of Systems     OBJECTIVE:     /78  Pulse 60  Wt 137 lb 9.6 oz (62.4 kg)  Breastfeeding? No  BMI 22.9 kg/m2  Body mass index is 22.9 kg/(m^2).  Physical Exam   Constitutional: She appears well-developed.   HENT:   Head: Normocephalic and atraumatic.   Cardiovascular: Normal rate, regular rhythm and normal heart sounds.    No murmur heard.  Pulmonary/Chest: Effort normal.       none     ASSESSMENT/PLAN:         1. Primary insomnia    - melatonin 3 MG tablet; Take 1 tablet (3 mg) by mouth nightly as needed for  sleep  Dispense: 90 tablet; Refill: 3    2. Weakness of both lower extremities  Home health care certification signed    3. Primary osteoarthritis, unspecified site          Byron Huerta MD  [facility] AND [facility]

## 2018-04-11 NOTE — MR AVS SNAPSHOT
After Visit Summary   4/11/2018    Karey Paulson    MRN: [id number]           Patient Information     Date Of Birth          10/17/1924        Visit Information        Provider Department      4/11/2018 1:15 PM Byron Huerta MD [facility]        Today's Diagnoses     Primary insomnia    -  1    Weakness of both lower extremities        Primary osteoarthritis, unspecified site           Follow-ups after your visit        Who to contact     If you have questions or need follow up information about today's clinic visit or your schedule please contact [facility] directly at [phone].  Normal or non-critical lab and imaging results will be communicated to you by MyChart, letter or phone within 4 business days after the clinic has received the results. If you do not hear from us within 7 days, please contact the clinic through MyChart or phone. If you have a critical or abnormal lab result, we will notify you by phone as soon as possible.  Submit refill requests through [employer] or call your pharmacy and they will forward the refill request to us. Please allow 3 business days for your refill to be completed.          Additional Information About Your Visit        Care EveryWhere ID     This is your Care EveryWhere ID. This could be used by other organizations to access your [facility] medical records  [id number]        Your Vitals Were     Pulse Breastfeeding? BMI (Body Mass Index)             60 No 22.9 kg/m2          Blood Pressure from Last 3 Encounters:   04/11/18 130/78   03/26/18 148/78   03/13/18 140/80    Weight from Last 3 Encounters:   04/11/18 137 lb 9.6 oz (62.4 kg)   03/26/18 140 lb (63.5 kg)   12/26/17 147 lb (66.7 kg)              We Performed the Following     MD CERTIFICATION HHA PATIENT          Today's Medication Changes          These changes are accurate as of 4/11/18  5:05 PM.  If you have any questions, ask your nurse or doctor.                Start taking these medicines.        Dose/Directions    melatonin 3 MG tablet   Used for:  Primary insomnia   Started by:  Byron Huerta MD        Dose:  3 mg   Take 1 tablet (3 mg) by mouth nightly as needed for sleep   Quantity:  90 tablet   Refills:  3            Where to get your medicines      These medications were sent to [facility] - [place], [place] - [address]  [address], [facility] [address]     Phone:  [phone]     melatonin 3 MG tablet                Primary Care Provider Office Phone # Fax #    Byron Huerta [phone]       [address]  [facility] [address]        Equal Access to Services     [facility] : Hadii elizabeth wilkins hadhugho Solizbeth, waaxda luqadaha, qaybta kaalmada ademedardoyada, charlie oakley . So [facility] [phone].    ATENCIÓN: Si habla español, tiene a garza disposición servicios gratuitos de asistencia lingüística. Llame al [phone].    We comply with applicable federal civil rights laws and [place] laws. We do not discriminate on the basis of race, color, national origin, age, disability, sex, sexual orientation, or gender identity.            Thank you!     Thank you for choosing [facility] AND [facility]  for your care. Our goal is always to provide you with excellent care. Hearing back from our patients is one way we can continue to improve our services. Please take a few minutes to complete the written survey that you may receive in the mail after your visit with us. Thank you!             Your Updated Medication List - Protect others around you: Learn how to safely use, store and throw away your medicines at www.disposemymeds.org.          This list is accurate as of 4/11/18  5:05 PM.  Always use your most recent med list.                   Brand Name Dispense Instructions for use Diagnosis    acetaZOLAMIDE 250 MG tablet    DIAMOX     Take 250 mg by mouth 2 times  daily        amLODIPine 10 MG tablet    NORVASC    90 tablet    TAKE 1 TABLET BY MOUTH ONCE DAILY    Essential hypertension       ASPIRIN LOW DOSE 81 MG EC tablet   Generic drug:  aspirin      TAKE 1 TABLET BY MOUTH EVERY EVENING        brimonidine 0.1 % ophthalmic solution    ALPHAGAN P     1 drop        cholecalciferol 1000 UNIT tablet    vitamin D3     TAKE 1 TABLET BY MOUTH ONCE DAILY (IN THE MORNING)        COMBIGAN 0.2-0.5 % ophthalmic solution   Generic drug:  brimonidine-timolol      PLACE 1 DROP IN THE LEFT EYE TWICE DAILY        latanoprost 0.005 % ophthalmic solution    XALATAN     PLACE 1 DROP IN THE RIGHT EYE NIGHTLY        MAPAP 500 MG tablet   Generic drug:  acetaminophen      TAKE 1 TABLET BY MOUTH FOUR TIMES DAILY AS NEEDED        melatonin 3 MG tablet     90 tablet    Take 1 tablet (3 mg) by mouth nightly as needed for sleep    Primary insomnia       prednisoLONE acetate 1 % ophthalmic susp    PRED FORTE     PLACE 1 DROP IN THE LEFT EYE FOUR TIMES DAILY        RANITIDINE HCL PO      Take 150 mg by mouth daily        SIMBRINZA 1-0.2 % ophthalmic suspension   Generic drug:  brinzolamide-brimonidine      PLACE 1 DROP IN THE RIGHT EYE THREE TIMES DAILY        timolol 0.5 % ophthalmic gel-form    TIMOPTIC-XE     1 drop        valsartan 80 MG tablet    DIOVAN    90 tablet    Take 1 tablet (80 mg) by mouth daily    Essential hypertension

## 2018-04-23 ENCOUNTER — TELEPHONE (OUTPATIENT)
Dept: FAMILY MEDICINE | Facility: OTHER | Age: 83
End: 2018-04-23

## 2018-04-23 DIAGNOSIS — K21.9 GASTROESOPHAGEAL REFLUX DISEASE WITHOUT ESOPHAGITIS: Primary | ICD-10-CM

## 2018-04-23 RX ORDER — MAGNESIUM HYDROXIDE/ALUMINUM HYDROXICE/SIMETHICONE 120; 1200; 1200 MG/30ML; MG/30ML; MG/30ML
15 SUSPENSION ORAL EVERY 4 HOURS PRN
Qty: 355 ML | Refills: 11 | Status: SHIPPED | OUTPATIENT
Start: 2018-04-23 | End: 2018-10-12

## 2018-04-23 NOTE — TELEPHONE ENCOUNTER
"PLEASE REVIEW AND ADDRESS AS APPROPRIATE: THANK YOU.     Note from [facility]:    \"Can we please get an order for Maalox liquid. We currently do not have a standing order for this. Anthony OlindaChico is looking for this!\"    Maalox    Last Written Prescription Date:    Last Fill Quantity: ,   # refills:   Last Office Visit: 4/11/18  Future Office visit:   None.    Routing refill request to provider for review/approval because:  Drug not active on patient's medication list    Medication not addi'd up as was unsure exact product and strength, etc.    Unable to complete prescription refill per RN Medication Refill Policy. Kelly Conner RN .............. 4/23/2018  10:49 AM      "

## 2018-04-25 ENCOUNTER — MEDICAL CORRESPONDENCE (OUTPATIENT)
Dept: HEALTH INFORMATION MANAGEMENT | Facility: OTHER | Age: 83
End: 2018-04-25

## 2018-04-26 ENCOUNTER — TELEPHONE (OUTPATIENT)
Dept: FAMILY MEDICINE | Facility: OTHER | Age: 83
End: 2018-04-26

## 2018-04-26 NOTE — TELEPHONE ENCOUNTER
Nurse from [facility] calling in regards to patient's melatonin. The order currently is PRN, however sometimes patient forgets to ask for it and she has difficulty sleeping and anxiety the next because of this. They are wanting an order to schedule to every HS. If agreeable, will fax phone note to them at [phone].    Kell Bird LPN...................4/26/2018  10:30 AM

## 2018-05-08 DIAGNOSIS — I10 ESSENTIAL HYPERTENSION: ICD-10-CM

## 2018-05-08 RX ORDER — VALSARTAN 80 MG/1
80 TABLET ORAL DAILY
Qty: 90 TABLET | Refills: 3 | Status: CANCELLED | OUTPATIENT
Start: 2018-05-08

## 2018-05-09 RX ORDER — VALSARTAN 80 MG/1
80 TABLET ORAL DAILY
Qty: 90 TABLET | Refills: 3 | Status: SHIPPED | OUTPATIENT
Start: 2018-05-09 | End: 2018-10-07

## 2018-06-11 ENCOUNTER — OFFICE VISIT (OUTPATIENT)
Dept: FAMILY MEDICINE | Facility: OTHER | Age: 83
End: 2018-06-11
Attending: FAMILY MEDICINE
Payer: MEDICARE

## 2018-06-11 VITALS
BODY MASS INDEX: 22.66 KG/M2 | DIASTOLIC BLOOD PRESSURE: 70 MMHG | HEIGHT: 65 IN | SYSTOLIC BLOOD PRESSURE: 130 MMHG | WEIGHT: 136 LBS | HEART RATE: 68 BPM

## 2018-06-11 DIAGNOSIS — M54.50 CHRONIC BILATERAL LOW BACK PAIN WITHOUT SCIATICA: ICD-10-CM

## 2018-06-11 DIAGNOSIS — R29.898 WEAKNESS OF BOTH LOWER EXTREMITIES: Primary | ICD-10-CM

## 2018-06-11 DIAGNOSIS — G89.29 CHRONIC BILATERAL LOW BACK PAIN WITHOUT SCIATICA: ICD-10-CM

## 2018-06-11 PROCEDURE — G0463 HOSPITAL OUTPT CLINIC VISIT: HCPCS

## 2018-06-11 PROCEDURE — 99214 OFFICE O/P EST MOD 30 MIN: CPT | Performed by: FAMILY MEDICINE

## 2018-06-11 NOTE — NURSING NOTE
Patient presents to the clinic today for a face to face, to get a new wheel chair.    Chitra Giron LPN.................. 6/11/2018 10:24 AM

## 2018-06-11 NOTE — PROGRESS NOTES
SUBJECTIVE:   Karey Paulson is a 89 year old female who presents to clinic today for the following health issues: Wheelchair prescription    HPI Comments: Patient arrives here wanting a new wheel chair prescription.  She states her current wheelchair is very old.  She has trouble turning at.  Padding is wearing out especially in the arms and seat.  This very uncomfortable.  She is needing a wheelchair periodically because of weakness in both e lower extremities.  She has been to physical therapy and has gotten some strength but not enough to do any prolonged walking.  She currently does well with a manual wheelchair.  She has had one for the last 3-4 years.        Patient Active Problem List    Diagnosis Date Noted     Primary insomnia 04/11/2018     Priority: Medium     Weakness of both lower extremities 03/27/2018     Priority: Medium     Essential hypertension 03/13/2018     Priority: Medium     S/P laparoscopic colectomy 11/14/2017     Priority: Medium     Diverticulosis of large intestine 10/22/2017     Priority: Medium     Overview:   S/p fle sig, 10/22/2017       Stricture of sigmoid colon 10/22/2017     Priority: Medium     Overview:   S/p flex sig 10/22/2017       [place]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     Priority: Medium     Overview:        Recurrent UTI 10/21/2017     Priority: Medium     Overview:   2 in 2017, 10/13/2017 and 6/28/2017       Pancreatic cyst 10/20/2017     Priority: Medium     Overview:   Cystic nodules, multiple; s/p CT Abd Pelv       Gastroesophageal reflux disease without esophagitis 12/31/2015     Priority: Medium     ACP (advance care planning) 12/05/2013     Priority: Medium     Backache 02/24/2013     Priority: Medium     Actinic keratosis 04/18/2012     Priority: Medium     Osteoarthrosis 11/08/2011     Priority: Medium     Hyperlipidemia 01/29/2009     Priority: Medium     Overview:   Overview:   IMO Update 10/11       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      Priority: Medium     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     2017     Acute myocardial infarction     2010     Cyst of pancreas     10/20/2017,Cystic nodules, multiple; s/p CT Abd Pelv     Diaphragmatic hernia without obstruction or gangrene     10/20/2017,s/p CT Abd/Pelv     Diverticulosis of large intestine without perforation or abscess without bleeding     10/22/2017,S/p fle sig, 10/22/2017     Edema     2011     Epigastric pain     2015     Essential (primary) hypertension     No Comments Provided     Gastro-esophageal reflux disease without esophagitis     2015     Osteoarthritis     2011     Other intestinal obstruction unspecified as to partial versus complete obstruction (CODE)     10/22/2017,S/p flex sig 10/22/2017     Personal history of malignant neoplasm of breast     Questionable 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     Personal history of other medical treatment (CODE)          Postmenopausal atrophic vaginitis     2012     Sepsis (H)     2017,E coli bacteremia; E coli UTI (resistant to Cipro)     Urinary tract infection     2017,Resistant to cipro     Urinary tract infection     10/21/2017,2 in 2017, 10/13/2017 and 2017      Social History   Substance Use Topics     Smoking status: Never Smoker     Smokeless tobacco: Never Used     Alcohol use No     Current Outpatient Prescriptions   Medication Sig Dispense Refill     acetaZOLAMIDE (DIAMOX) 250 MG tablet Take 250 mg by mouth 2 times daily  0     alum & mag hydroxide-simethicone (MYLANTA/MAALOX) 200-200-20 MG/5ML SUSP suspension Take 15 mLs by mouth every 4 hours as needed for indigestion 355 mL 11     amLODIPine (NORVASC) 10 MG tablet TAKE 1 TABLET BY MOUTH ONCE DAILY 90 tablet 3     ASPIRIN LOW DOSE 81 MG EC tablet TAKE 1 TABLET BY MOUTH EVERY EVENING  0     COMBIGAN 0.2-0.5 % ophthalmic solution PLACE 1 DROP IN THE LEFT EYE TWICE DAILY  " 12     latanoprost (XALATAN) 0.005 % ophthalmic solution PLACE 1 DROP IN THE RIGHT EYE NIGHTLY  12     MAPAP 500 MG tablet TAKE 1 TABLET BY MOUTH FOUR TIMES DAILY AS NEEDED  0     melatonin 3 MG tablet Take 1 tablet (3 mg) by mouth nightly as needed for sleep 90 tablet 3     order for DME Equipment being ordered: Wheelchair manual 1 each 0     prednisoLONE acetate (PRED FORTE) 1 % ophthalmic susp PLACE 1 DROP IN THE LEFT EYE FOUR TIMES DAILY  3     ranitidine (ZANTAC) 150 MG tablet Take 1 tablet (150 mg) by mouth 2 times daily       SIMBRINZA 1-0.2 % ophthalmic suspension PLACE 1 DROP IN THE RIGHT EYE THREE TIMES DAILY  3     timolol (TIMOPTIC-XE) 0.5 % ophthalmic gel-form 1 drop       valsartan (DIOVAN) 80 MG tablet Take 1 tablet (80 mg) by mouth daily 90 tablet 3     VITAMIN D3 1000 UNITS tablet TAKE 1 TABLET BY MOUTH ONCE DAILY (IN THE MORNING)  0     Allergies   Allergen Reactions     Atorvastatin Other (See Comments) and Nausea and Vomiting     Myalgia     Ciprofloxacin Other (See Comments)     Erythromycin      Lisinopril Cough     Simvastatin Other (See Comments)     Achey muscles.      Sulfamethoxazole W/Trimethoprim Unknown     Nausea and shaking       Review of Systems     OBJECTIVE:     /70 (BP Location: Left arm, Patient Position: Sitting, Cuff Size: Adult Regular)  Pulse 68  Ht 5' 5\" (1.651 m)  Wt 136 lb (61.7 kg)  Breastfeeding? No  BMI 22.63 kg/m2  Body mass index is 22.63 kg/(m^2).  Physical Exam   Constitutional: She appears well-developed.   HENT:   Head: Normocephalic.   Eyes: Pupils are equal, round, and reactive to light.   Neurological: She is alert.   Skin: Skin is warm.   Psychiatric: She has a normal mood and affect.       none     ASSESSMENT/PLAN:         1. Weakness of both lower extremities    - order for DME; Equipment being ordered: Wheelchair manual  Dispense: 1 each; Refill: 0    2. Chronic bilateral low back pain without sciatica    - order for DME; Equipment being " ordered: Wheelchair manual  Dispense: 1 each; Refill: 0    I believe the patient is a candidate for a wheelchair.  Her current wheelchair appears rundown.  She even had trouble turning it. 25 min spent with pt counseling and coordinating care      Byron Huerta MD  [facility]

## 2018-06-11 NOTE — MR AVS SNAPSHOT
"              After Visit Summary   6/11/2018    Karey Paulson    MRN: [id number]           Patient Information     Date Of Birth          10/17/1924        Visit Information        Provider Department      6/11/2018 10:30 AM Byron Huerta MD [facility]        Today's Diagnoses     Weakness of both lower extremities    -  1    Chronic bilateral low back pain without sciatica           Follow-ups after your visit        Who to contact     If you have questions or need follow up information about today's clinic visit or your schedule please contact [facility] directly at [phone].  Normal or non-critical lab and imaging results will be communicated to you by MyChart, letter or phone within 4 business days after the clinic has received the results. If you do not hear from us within 7 days, please contact the clinic through MyChart or phone. If you have a critical or abnormal lab result, we will notify you by phone as soon as possible.  Submit refill requests through [employer] or call your pharmacy and they will forward the refill request to us. Please allow 3 business days for your refill to be completed.          Additional Information About Your Visit        Care EveryWhere ID     This is your Care EveryWhere ID. This could be used by other organizations to access your [facility] medical records  [id number]        Your Vitals Were     Pulse Height Breastfeeding? BMI (Body Mass Index)          68 5' 5\" (1.651 m) No 22.63 kg/m2         Blood Pressure from Last 3 Encounters:   06/11/18 130/70   04/11/18 130/78   03/26/18 148/78    Weight from Last 3 Encounters:   06/11/18 136 lb (61.7 kg)   04/11/18 137 lb 9.6 oz (62.4 kg)   03/26/18 140 lb (63.5 kg)              Today, you had the following     No orders found for display         Today's Medication Changes          These changes are accurate as of 6/11/18  3:16 PM.  If you have any questions, ask your nurse or doctor. "               Start taking these medicines.        Dose/Directions    order for DME   Used for:  Weakness of both lower extremities, Chronic bilateral low back pain without sciatica   Started by:  Byron Huerta MD        Equipment being ordered: Wheelchair manual   Quantity:  1 each   Refills:  0         These medicines have changed or have updated prescriptions.        Dose/Directions    ranitidine 150 MG tablet   Commonly known as:  ZANTAC   This may have changed:  Another medication with the same name was removed. Continue taking this medication, and follow the directions you see here.   Changed by:  Byron Huerta MD        Dose:  150 mg   Take 1 tablet (150 mg) by mouth 2 times daily   Refills:  0         Stop taking these medicines if you haven't already. Please contact your care team if you have questions.     brimonidine 0.1 % ophthalmic solution   Commonly known as:  ALPHAGAN P   Stopped by:  Byron Huerta MD                Where to get your medicines      Some of these will need a paper prescription and others can be bought over the counter.  Ask your nurse if you have questions.     Bring a paper prescription for each of these medications     order for DME                Primary Care Provider Office Phone # Fax #    Byron Huerta [phone] [address] [facility] [address]        Equal Access to Services     [facility]: Hadii elizabeth wilkins hadasho Sobrianali, waaxda luqadaha, qaybta kaalmada ademedardoyada, charlie pruitt. So [facility] [phone].    ATENCIÓN: Si habla español, tiene a garza disposición servicios gratuitos de asistencia lingüística. Llame al [phone].    We comply with applicable federal civil rights laws and [place] laws. We do not discriminate on the basis of race, color, national origin, age, disability, sex, sexual orientation, or gender identity.            Thank you!     Thank you for choosing [facility] AND  [facility]  for your care. Our goal is always to provide you with excellent care. Hearing back from our patients is one way we can continue to improve our services. Please take a few minutes to complete the written survey that you may receive in the mail after your visit with us. Thank you!             Your Updated Medication List - Protect others around you: Learn how to safely use, store and throw away your medicines at www.disposemymeds.org.          This list is accurate as of 6/11/18  3:16 PM.  Always use your most recent med list.                   Brand Name Dispense Instructions for use Diagnosis    acetaZOLAMIDE 250 MG tablet    DIAMOX     Take 250 mg by mouth 2 times daily        alum & mag hydroxide-simethicone 200-200-20 MG/5ML Susp suspension    MYLANTA/MAALOX    355 mL    Take 15 mLs by mouth every 4 hours as needed for indigestion    Gastroesophageal reflux disease without esophagitis       amLODIPine 10 MG tablet    NORVASC    90 tablet    TAKE 1 TABLET BY MOUTH ONCE DAILY    Essential hypertension       ASPIRIN LOW DOSE 81 MG EC tablet   Generic drug:  aspirin      TAKE 1 TABLET BY MOUTH EVERY EVENING        cholecalciferol 1000 UNIT tablet    vitamin D3     TAKE 1 TABLET BY MOUTH ONCE DAILY (IN THE MORNING)        COMBIGAN 0.2-0.5 % ophthalmic solution   Generic drug:  brimonidine-timolol      PLACE 1 DROP IN THE LEFT EYE TWICE DAILY        latanoprost 0.005 % ophthalmic solution    XALATAN     PLACE 1 DROP IN THE RIGHT EYE NIGHTLY        MAPAP 500 MG tablet   Generic drug:  acetaminophen      TAKE 1 TABLET BY MOUTH FOUR TIMES DAILY AS NEEDED        melatonin 3 MG tablet     90 tablet    Take 1 tablet (3 mg) by mouth nightly as needed for sleep    Primary insomnia       order for DME     1 each    Equipment being ordered: Wheelchair manual    Weakness of both lower extremities, Chronic bilateral low back pain without sciatica       prednisoLONE acetate 1 % ophthalmic susp    PRED FORTE     PLACE 1  DROP IN THE LEFT EYE FOUR TIMES DAILY        ranitidine 150 MG tablet    ZANTAC     Take 1 tablet (150 mg) by mouth 2 times daily        SIMBRINZA 1-0.2 % ophthalmic suspension   Generic drug:  brinzolamide-brimonidine      PLACE 1 DROP IN THE RIGHT EYE THREE TIMES DAILY        timolol 0.5 % ophthalmic gel-form    TIMOPTIC-XE     1 drop        valsartan 80 MG tablet    DIOVAN    90 tablet    Take 1 tablet (80 mg) by mouth daily    Essential hypertension

## 2018-07-12 DIAGNOSIS — F51.01 PRIMARY INSOMNIA: ICD-10-CM

## 2018-07-16 NOTE — TELEPHONE ENCOUNTER
Redundant refill requests refused: Too soon.    melatonin 3 MG tablet 90 tablet 3 4/11/2018  --   Sig - Route: Take 1 tablet (3 mg) by mouth nightly as needed for sleep - Oral   Class: E-Prescribe   Order: [id number]   E-Prescribing Status: Receipt confirmed by pharmacy (4/11/2018  1:32 PM CDT)     VITAMIN D 1,000 unit tablet 90 tablet 2 12/7/2017     Sig - Route: TAKE 1 TABLET BY MOUTH ONCE DAILY - Oral    Class: eRx    Notes to Pharmacy: WE ARE SHORT FOR THE NEXT SET OF BLISTER PACKS. MAY WE PLEASE HAVE A REFILL? THANK YOU!    Cosign for Ordering: Accepted by Byron Huerta MD on 12/7/2017 12:04 PM    E-Prescribing Status: Receipt confirmed by pharmacy (12/7/2017  9:46 AM CST)      [facility] - [place], [place] - [address]     Unable to complete prescription refill per RN Medication Refill Policy. Kelly Conner RN .............. 7/17/2018  8:42 AM

## 2018-07-17 RX ORDER — CHOLECALCIFEROL (VITAMIN D3) 25 MCG
TABLET ORAL
Qty: 90 TABLET | OUTPATIENT
Start: 2018-07-17

## 2018-07-17 RX ORDER — LANOLIN ALCOHOL/MO/W.PET/CERES
CREAM (GRAM) TOPICAL
Qty: 60 TABLET | OUTPATIENT
Start: 2018-07-17

## 2018-07-19 DIAGNOSIS — F51.01 PRIMARY INSOMNIA: ICD-10-CM

## 2018-07-23 RX ORDER — LANOLIN ALCOHOL/MO/W.PET/CERES
CREAM (GRAM) TOPICAL
Qty: 100 TABLET | Refills: 0 | Status: SHIPPED | OUTPATIENT
Start: 2018-07-23 | End: 2018-10-01

## 2018-07-23 NOTE — TELEPHONE ENCOUNTER
Refill request from [facility] for:  melatonin 3 MG tablet    LOV 6/11/2018 with PCP    4/26/2018-medication changed to scheduled as per Byron Huerta MD as requested by Tarik Cates    Refilled pharmacy requested amount of qty 100 for insurance billing  Requested Prescriptions   Pending Prescriptions Disp Refills     melatonin 3 MG tablet [Pharmacy Med Name: MELATONIN 3 MG TABLET] 60 tablet      Sig: TAKE 1 TABLET BY MOUTH NIGHTLY AT BEDTIME    There is no refill protocol information for this order        Prescription refilled per RN Medication Refill Policy.................... Aracelis Anthony ....................  7/23/2018   3:45 PM

## 2018-07-24 NOTE — PROGRESS NOTES
Patient Information     Patient Name  Karey Paulson MRN  [id number] Sex  Female   10/17/1924      Letter by Byron Huerta MD at      Author:  Byron Huerta MD Service:  (none) Author Type:  (none)    Filed:   Encounter Date:  2017 Status:  (Other)           Karey Paulson  [id number]  [address] [facility] [address]          2017    Dear Ms. Paulson:    This letter is to remind you that you are due for your annual exam with Byron Huerta MD. Your last comprehensive visit was more than 12 months ago.    A LIMITED refill of VITAMIN D 1,000 unit tablet has been called into your pharmacy. Additional refills require you to complete this appointment.    Please call the clinic at [phone] to schedule your appointment.    Thank you for choosing [facility] [place] and [facility] for your health care needs.    Sincerely,    Refill RN  [facility] [place]

## 2018-07-26 DIAGNOSIS — I10 ESSENTIAL HYPERTENSION: Primary | ICD-10-CM

## 2018-07-27 RX ORDER — VALSARTAN 80 MG/1
TABLET ORAL
Qty: 90 TABLET | OUTPATIENT
Start: 2018-07-27

## 2018-07-27 RX ORDER — LOSARTAN POTASSIUM 50 MG/1
50 TABLET ORAL DAILY
Qty: 90 TABLET | Refills: 2 | Status: SHIPPED | OUTPATIENT
Start: 2018-07-27 | End: 2018-01-01

## 2018-07-27 NOTE — TELEPHONE ENCOUNTER
In clinical absence of patient's primary, Byron Huerta, or FLO in Unit #1, patient is requesting that this message be sent to the Doc of the Day for consideration please.     Note from pharmacy:  Valsartan was recalled and not available at this time. Can we change to Losartan or equivalent? Please send a new e-script of an alternative. Patient will be out 8/8/18. She is a [facility] Patient.     Valsartan last filled:  valsartan (DIOVAN) 80 MG tablet 90 tablet 3 5/9/2018  No   Sig - Route: Take 1 tablet (80 mg) by mouth daily - Oral   Class: E-Prescribe     [facility] - [place], [place] - [address]     Per retail pharmacy, Valsartan 80 mg equal to Losartan 50 mg. Alternative medication addi'd up for MD approval.    Kelly Conner RN .............. 7/27/2018  3:40 PM

## 2018-10-04 ENCOUNTER — TELEPHONE (OUTPATIENT)
Dept: FAMILY MEDICINE | Facility: OTHER | Age: 83
End: 2018-10-04

## 2018-10-04 DIAGNOSIS — M15.0 PRIMARY OSTEOARTHRITIS INVOLVING MULTIPLE JOINTS: Primary | ICD-10-CM

## 2018-10-04 RX ORDER — ACETAMINOPHEN 500 MG
500 TABLET ORAL 4 TIMES DAILY
Qty: 120 TABLET | Status: CANCELLED | OUTPATIENT
Start: 2018-10-04

## 2018-10-04 NOTE — TELEPHONE ENCOUNTER
"Fax received from [phone] with message regarding the following:    MAPAP 500 MG tablet 129 tablet 2 10/3/2018  Yes   Sig: TAKE 1 TABLET BY MOUTH FOUR TIMES DAILY AS NEEDED     \"Patient has been taking this scheduled, is this a change?\"    Per review of Patient's chart in [facility]:  The above order was reordered from a \"patient reported\" entry from 12/6/17 for taking four times daily as needed.    However, per review of Patient's chart in [facility]:  The last time this medication was prescribed, was on 12/14/17 and ordered as scheduled, co-signed by Dr. Huerta and sent to [facility]:    MAPAP EXTRA STRENGTH 500 mg tablet 129 tablet 9 12/14/2017  No   Sig: TAKE 1 TABLET BY MOUTH FOUR TIMES DAILY (AM,NOON,PM,HS)     Medication addi'd up as requested and writer will send to PCP for review and consideration. Kelly Conner RN .............. 10/4/2018  9:01 AM      "

## 2018-10-04 NOTE — TELEPHONE ENCOUNTER
Left message on secure phone at [facility] phone. Poppy Wang LPN .......................10/4/2018  11:45 AM

## 2018-10-07 ENCOUNTER — APPOINTMENT (OUTPATIENT)
Dept: GENERAL RADIOLOGY | Facility: OTHER | Age: 83
End: 2018-10-07
Attending: FAMILY MEDICINE
Payer: MEDICARE

## 2018-10-07 ENCOUNTER — HOSPITAL ENCOUNTER (EMERGENCY)
Facility: OTHER | Age: 83
Discharge: HOME | End: 2018-10-07
Attending: FAMILY MEDICINE | Admitting: FAMILY MEDICINE
Payer: MEDICARE

## 2018-10-07 VITALS
OXYGEN SATURATION: 99 % | BODY MASS INDEX: 22.66 KG/M2 | DIASTOLIC BLOOD PRESSURE: 71 MMHG | SYSTOLIC BLOOD PRESSURE: 132 MMHG | WEIGHT: 141 LBS | RESPIRATION RATE: 14 BRPM | TEMPERATURE: 97 F | HEIGHT: 66 IN

## 2018-10-07 DIAGNOSIS — R55 VASOVAGAL SYNCOPE: ICD-10-CM

## 2018-10-07 LAB
ALBUMIN SERPL-MCNC: 3.6 G/DL (ref 3.5–5.7)
ALBUMIN UR-MCNC: NEGATIVE MG/DL
ALP SERPL-CCNC: 41 U/L (ref 34–104)
ALT SERPL W P-5'-P-CCNC: 17 U/L (ref 7–52)
AMORPH CRY #/AREA URNS HPF: ABNORMAL /HPF
ANION GAP SERPL CALCULATED.3IONS-SCNC: 7 MMOL/L (ref 3–14)
APPEARANCE UR: ABNORMAL
AST SERPL W P-5'-P-CCNC: 17 U/L (ref 13–39)
BACTERIA #/AREA URNS HPF: ABNORMAL /HPF
BASOPHILS # BLD AUTO: 0.1 10E9/L (ref 0–0.2)
BASOPHILS NFR BLD AUTO: 0.6 %
BILIRUB SERPL-MCNC: 0.5 MG/DL (ref 0.3–1)
BILIRUB UR QL STRIP: NEGATIVE
BUN SERPL-MCNC: 31 MG/DL (ref 7–25)
CALCIUM SERPL-MCNC: 9.5 MG/DL (ref 8.6–10.3)
CHLORIDE SERPL-SCNC: 110 MMOL/L (ref 98–107)
CO2 SERPL-SCNC: 21 MMOL/L (ref 21–31)
COLOR UR AUTO: YELLOW
CREAT SERPL-MCNC: 0.68 MG/DL (ref 0.6–1.2)
DIFFERENTIAL METHOD BLD: ABNORMAL
EOSINOPHIL # BLD AUTO: 0.1 10E9/L (ref 0–0.7)
EOSINOPHIL NFR BLD AUTO: 1.2 %
ERYTHROCYTE [DISTWIDTH] IN BLOOD BY AUTOMATED COUNT: 14.1 % (ref 10–15)
GFR SERPL CREATININE-BSD FRML MDRD: 81 ML/MIN/1.7M2
GLUCOSE SERPL-MCNC: 166 MG/DL (ref 70–105)
GLUCOSE UR STRIP-MCNC: NEGATIVE MG/DL
HCT VFR BLD AUTO: 36.1 % (ref 35–47)
HGB BLD-MCNC: 11.9 G/DL (ref 11.7–15.7)
HGB UR QL STRIP: NEGATIVE
IMM GRANULOCYTES # BLD: 0 10E9/L (ref 0–0.4)
IMM GRANULOCYTES NFR BLD: 0.3 %
KETONES UR STRIP-MCNC: NEGATIVE MG/DL
LEUKOCYTE ESTERASE UR QL STRIP: ABNORMAL
LYMPHOCYTES # BLD AUTO: 0.8 10E9/L (ref 0.8–5.3)
LYMPHOCYTES NFR BLD AUTO: 7.5 %
MCH RBC QN AUTO: 29.2 PG (ref 26.5–33)
MCHC RBC AUTO-ENTMCNC: 33 G/DL (ref 31.5–36.5)
MCV RBC AUTO: 89 FL (ref 78–100)
MONOCYTES # BLD AUTO: 0.8 10E9/L (ref 0–1.3)
MONOCYTES NFR BLD AUTO: 7.1 %
NEUTROPHILS # BLD AUTO: 9.1 10E9/L (ref 1.6–8.3)
NEUTROPHILS NFR BLD AUTO: 83.3 %
NITRATE UR QL: NEGATIVE
PH UR STRIP: 8 PH (ref 5–9)
PLATELET # BLD AUTO: 244 10E9/L (ref 150–450)
POTASSIUM SERPL-SCNC: 3.4 MMOL/L (ref 3.5–5.1)
PROT SERPL-MCNC: 6.2 G/DL (ref 6.4–8.9)
RBC # BLD AUTO: 4.08 10E12/L (ref 3.8–5.2)
RBC #/AREA URNS AUTO: ABNORMAL /HPF
SODIUM SERPL-SCNC: 138 MMOL/L (ref 134–144)
SOURCE: ABNORMAL
SP GR UR STRIP: 1.01 (ref 1–1.03)
TROPONIN I SERPL-MCNC: <0.03 UG/L (ref 0–0.03)
UROBILINOGEN UR STRIP-ACNC: 1 EU/DL (ref 0.2–1)
WBC # BLD AUTO: 10.9 10E9/L (ref 4–11)
WBC #/AREA URNS AUTO: ABNORMAL /HPF

## 2018-10-07 PROCEDURE — 36415 COLL VENOUS BLD VENIPUNCTURE: CPT | Performed by: FAMILY MEDICINE

## 2018-10-07 PROCEDURE — 71045 X-RAY EXAM CHEST 1 VIEW: CPT

## 2018-10-07 PROCEDURE — 84484 ASSAY OF TROPONIN QUANT: CPT | Performed by: FAMILY MEDICINE

## 2018-10-07 PROCEDURE — 80053 COMPREHEN METABOLIC PANEL: CPT | Performed by: FAMILY MEDICINE

## 2018-10-07 PROCEDURE — 99285 EMERGENCY DEPT VISIT HI MDM: CPT | Mod: 25 | Performed by: FAMILY MEDICINE

## 2018-10-07 PROCEDURE — 85025 COMPLETE CBC W/AUTO DIFF WBC: CPT | Performed by: FAMILY MEDICINE

## 2018-10-07 PROCEDURE — 93005 ELECTROCARDIOGRAM TRACING: CPT | Performed by: FAMILY MEDICINE

## 2018-10-07 PROCEDURE — 99283 EMERGENCY DEPT VISIT LOW MDM: CPT | Mod: Z6 | Performed by: FAMILY MEDICINE

## 2018-10-07 PROCEDURE — 81001 URINALYSIS AUTO W/SCOPE: CPT | Performed by: FAMILY MEDICINE

## 2018-10-07 PROCEDURE — 93010 ELECTROCARDIOGRAM REPORT: CPT | Performed by: INTERNAL MEDICINE

## 2018-10-07 RX ORDER — ATENOLOL 25 MG/1
25 TABLET ORAL
COMMUNITY
End: 2018-10-12

## 2018-10-07 RX ORDER — HYDROCHLOROTHIAZIDE 25 MG/1
25 TABLET ORAL
COMMUNITY
End: 2018-10-12

## 2018-10-07 RX ORDER — LATANOPROST 50 UG/ML
1 SOLUTION/ DROPS OPHTHALMIC
COMMUNITY
End: 2018-10-12

## 2018-10-07 RX ORDER — TIMOLOL MALEATE 5 MG/ML
1 SOLUTION/ DROPS OPHTHALMIC
COMMUNITY
Start: 2015-09-10 | End: 2018-10-12

## 2018-10-07 RX ORDER — SUCRALFATE 1 G/1
1 TABLET ORAL
COMMUNITY
End: 2018-10-12

## 2018-10-07 RX ORDER — BRIMONIDINE TARTRATE 1 MG/ML
1 SOLUTION/ DROPS OPHTHALMIC
Status: ON HOLD | COMMUNITY
Start: 2015-12-10 | End: 2018-01-01

## 2018-10-07 RX ORDER — TIMOLOL MALEATE 5 MG/ML
SOLUTION/ DROPS OPHTHALMIC
Refills: 6 | COMMUNITY
Start: 2018-06-01 | End: 2019-01-01

## 2018-10-07 RX ORDER — TIMOLOL MALEATE 5 MG/ML
1 SOLUTION OPHTHALMIC
COMMUNITY
End: 2018-10-12

## 2018-10-07 RX ORDER — DORZOLAMIDE HYDROCHLORIDE AND TIMOLOL MALEATE 20; 5 MG/ML; MG/ML
SOLUTION/ DROPS OPHTHALMIC
Refills: 12 | COMMUNITY
Start: 2018-03-23 | End: 2018-10-12

## 2018-10-07 RX ORDER — FLUOROMETHOLONE 0.1 %
1 SUSPENSION, DROPS(FINAL DOSAGE FORM)(ML) OPHTHALMIC (EYE)
COMMUNITY
Start: 2015-09-25 | End: 2018-10-12

## 2018-10-07 RX ORDER — PREDNISOLONE ACETATE 10 MG/ML
1 SUSPENSION/ DROPS OPHTHALMIC
COMMUNITY
End: 2018-10-12

## 2018-10-07 RX ORDER — VALSARTAN 80 MG/1
80 TABLET ORAL
COMMUNITY
End: 2018-10-12

## 2018-10-07 RX ORDER — AMLODIPINE BESYLATE 10 MG/1
10 TABLET ORAL
COMMUNITY
End: 2018-10-12

## 2018-10-07 RX ORDER — BRIMONIDINE TARTRATE 1 MG/ML
SOLUTION/ DROPS OPHTHALMIC
Refills: 5 | COMMUNITY
Start: 2018-05-18 | End: 2018-10-12

## 2018-10-07 NOTE — ED AVS SNAPSHOT
" [facility]    [address]    [place] [address]    Phone:  [phone]    Fax:  [phone]                                       Karey Paulson   MRN: [id number]    Department:  [facility]   Date of Visit:  10/7/2018           Patient Information     Date Of Birth          10/17/1924        Your diagnoses for this visit were:     Vasovagal syncope        You were seen by Waqas Espana MD.      Follow-up Information     Follow up with [facility].    Specialty:  EMERGENCY MEDICINE    Why:  As needed    Contact information:    [address]  [place] [address] [phone]        Discharge Instructions         Near-Fainting: Vagal Reaction  Fainting (syncope) is a temporary loss of consciousness (passing out). It is associated with a loss of postural tone. Postural tone is the constant contraction of the muscles in your body to help keep your body upright. It also helps blood return towards the heart and brain. Syncope occurs when there is reduced blood flow to the brain due to this common vagal reaction. A vagal reaction is a reflex response that causes a sudden drop in your blood pressure, and your pulse to slow down. If the pulse is low enough, the blood pressure falls and causes fainting or near-fainting. Lying down usually stops the reaction very quickly.  These are symptoms of near-fainting:    Feeling lightheaded or like you are going to faint    Weak pulse    Nausea    Sweating    Blurred vision or feeling like your vision is \"blacking out\"    Palpitations    Chest pain    Trouble breathing    Cool and clammy skin  Causes for near-fainting include:    Sudden emotional stress like fear, pain, panic, sight of blood    Straining or overexertion, straining while using the toilet, coughing, sneezing    Standing up too quickly, or standing up for too long a time    Pregnancy  Home care  The following will " help you care for yourself at home:    Rest today and go back to your normal activities as soon as you are feeling back to normal.    If you become light-headed or dizzy, lie down right away or sit with your head lowered between your knees.    Stay hydrated and do not skip meals.    Don't stand for long periods or stay in hot places    Do what you can to prevent constipation. If you bear down excessively when trying to have a bowel movement, this can trigger a vagal response  There may be other causes for a vagal response and near-syncope. For example, this can happen after open-heart surgery when the heart muscle is inflamed and irritated.  Check with your doctor to see if there is testing you need such as a tilt-table test, heart rhythm monitoring, or blood tests. Review the medicines you take with your healthcare provider and pharmacist to be sure the symptoms you have are not a side effect of a medicine.  Follow-up care  Follow up with your healthcare provider, or as advised.   If you are having frequent episodes of near-syncope or vagal reactions, be cautious about activities such as driving that could harm yourself or others if you were to faint. Do not drive or operate heavy machinery if you are feeling like you may faint.  Call 911  Call 911 if any of these occur:    Another fainting spell occurs, and it is not explained by the common causes listed above    Fainting or loss of consciousness    Chest, arm, neck, jaw, back, or abdominal pain    Shortness of breath    Weakness, tingling, or numbness in one side of the face, one arm or leg    Slurred speech, confusion, trouble walking or seeing    Seizure    Blood in vomit or stools (black or red color)  When to seek medical advice  Call your healthcare provider right away if you have occasional mild lightheadedness, especially when standing up.  Date Last Reviewed: 6/1/2016 2000-2017 [employer]. [address] [facility] [facility], [place] [address]. All  rights reserved. This information is not intended as a substitute for professional medical care. Always follow your healthcare professional's instructions.          Your next 10 appointments already scheduled     Oct 10, 2018  9:15 AM CDT   Office Visit with Byron Huerta MD   [facility] ([facility])    [address]  [place] [address] [phone]           Bring a current list of meds and any records pertaining to this visit. For Physicals, please bring immunization records and any forms needing to be filled out. Please arrive 10 minutes early to complete paperwork.              24 Hour Appointment Hotline     To schedule an appointment at [facility], please call [phone]. If you don't have a family doctor or clinic, we will help you find one. [facility] clinics are conveniently located to serve the needs of you and your family.           Review of your medicines      Our records show that you are taking the medicines listed below. If these are incorrect, please call your family doctor or clinic.        Dose / Directions Last dose taken    acetaZOLAMIDE 250 MG tablet   Commonly known as:  DIAMOX   Dose:  250 mg        Take 250 mg by mouth 2 times daily   Refills:  0        alum & mag hydroxide-simethicone 200-200-20 MG/5ML Susp suspension   Commonly known as:  MYLANTA/MAALOX   Dose:  15 mL   Quantity:  355 mL        Take 15 mLs by mouth every 4 hours as needed for indigestion   Refills:  11        * amLODIPine 10 MG tablet   Commonly known as:  NORVASC   Dose:  10 mg        Take 10 mg by mouth   Refills:  0        * amLODIPine 10 MG tablet   Commonly known as:  NORVASC   Quantity:  90 tablet        TAKE 1 TABLET BY MOUTH ONCE DAILY   Refills:  3        ASPIRIN LOW DOSE 81 MG EC tablet   Quantity:  90 tablet   Generic drug:  aspirin        TAKE 1 TABLET BY MOUTH EVERY EVENING   Refills:  2        atenolol 25 MG tablet   Commonly known as:  TENORMIN    Dose:  25 mg        Take 25 mg by mouth   Refills:  0        * brimonidine 0.1 % ophthalmic solution   Commonly known as:  ALPHAGAN P   Dose:  1 drop        1 drop   Refills:  0        * ALPHAGAN P 0.1 % ophthalmic solution   Generic drug:  brimonidine        PLACE 1 DROP IN THE RIGHT EYE THREE TIMES DAILY   Refills:  5        cholecalciferol 1000 UNIT tablet   Commonly known as:  vitamin D3        TAKE 1 TABLET BY MOUTH ONCE DAILY (IN THE MORNING)   Refills:  0        COMBIGAN 0.2-0.5 % ophthalmic solution   Generic drug:  brimonidine-timolol        PLACE 1 DROP IN THE LEFT EYE TWICE DAILY   Refills:  12        dorzolamide-timolol 2-0.5 % ophthalmic solution   Commonly known as:  COSOPT        INSTILL ONE DROP IN THE LEFT EYE TWICE DAILY   Refills:  12        fluorometholone 0.1 % ophthalmic susp   Commonly known as:  FML LIQUIFILM   Dose:  1 drop        1 drop   Refills:  0        hydrochlorothiazide 25 MG tablet   Commonly known as:  HYDRODIURIL   Dose:  25 mg        Take 25 mg by mouth   Refills:  0        * latanoprost 0.005 % ophthalmic solution   Commonly known as:  XALATAN   Dose:  1 drop        Apply 1 drop to eye   Refills:  0        * latanoprost 0.005 % ophthalmic solution   Commonly known as:  XALATAN        PLACE 1 DROP IN THE RIGHT EYE NIGHTLY   Refills:  12        losartan 50 MG tablet   Commonly known as:  COZAAR   Dose:  50 mg   Quantity:  90 tablet        Take 1 tablet (50 mg) by mouth daily   Refills:  2        MAPAP 500 MG tablet   Quantity:  129 tablet   Generic drug:  acetaminophen        TAKE 1 TABLET BY MOUTH FOUR TIMES DAILY AS NEEDED   Refills:  2        melatonin 3 MG tablet   Quantity:  100 tablet        TAKE 1 TABLET BY MOUTH NIGHTLY AT BEDTIME   Refills:  2        order for DME   Quantity:  1 each        Equipment being ordered: Wheelchair manual   Refills:  0        * prednisoLONE acetate 1 % ophthalmic susp   Commonly known as:  PRED FORTE   Dose:  1 drop        Apply 1 drop to eye    Refills:  0        * prednisoLONE acetate 1 % ophthalmic susp   Commonly known as:  PRED FORTE        PLACE 1 DROP IN THE LEFT EYE FOUR TIMES DAILY   Refills:  3        ranitidine 150 MG tablet   Commonly known as:  ZANTAC   Dose:  150 mg        Take 1 tablet (150 mg) by mouth 2 times daily   Refills:  0        SIMBRINZA 1-0.2 % ophthalmic suspension   Generic drug:  brinzolamide-brimonidine        PLACE 1 DROP IN THE RIGHT EYE THREE TIMES DAILY   Refills:  3        sucralfate 1 GM tablet   Commonly known as:  CARAFATE   Dose:  1 g        Take 1 g by mouth   Refills:  0        * timolol 0.5 % ophthalmic gel-form   Commonly known as:  TIMOPTIC-XE   Dose:  1 drop        1 drop   Refills:  0        * timolol 0.5 % ophthalmic gel-form   Commonly known as:  TIMOPTIC-XE   Dose:  1 drop        Apply 1 drop to eye   Refills:  0        * timolol 0.5 % ophthalmic solution   Commonly known as:  TIMOPTIC   Dose:  1 drop        Inject 1 drop into the eye   Refills:  0        * timolol 0.5 % ophthalmic solution   Commonly known as:  TIMOPTIC        PLACE 1 DROP IN THE RIGHT EYE TWICE DAILY   Refills:  6        valsartan 80 MG tablet   Commonly known as:  DIOVAN   Dose:  80 mg        Take 80 mg by mouth   Refills:  0        * Notice:  This list has 12 medication(s) that are the same as other medications prescribed for you. Read the directions carefully, and ask your doctor or other care provider to review them with you.            Procedures and tests performed during your visit     *UA reflex to Microscopic    CBC with platelets differential    Comprehensive metabolic panel    EKG 12-lead, tracing only    Troponin I    Urine Microscopic    XR Chest Port 1 View      Orders Needing Specimen Collection     None      Pending Results     No orders found from 10/5/2018 to 10/8/2018.            Pending Culture Results     No orders found from 10/5/2018 to 10/8/2018.            Pending Results Instructions     If you had any lab  "results that were not finalized at the time of your Discharge, you can call the ED Lab Result RN at [phone]. You will be contacted by this team for any positive Lab results or changes in treatment. The nurses are available 7 days a week from 10A to 6:30P.  You can leave a message 24 hours per day and they will return your call.        Thank you for choosing [facility]       Thank you for choosing [facility] for your care. Our goal is always to provide you with excellent care. Hearing back from our patients is one way we can continue to improve our services. Please take a few minutes to complete the written survey that you may receive in the mail after you visit with us. Thank you!        [employer] Information     [employer] lets you send messages to your doctor, view your test results, renew your prescriptions, schedule appointments and more. To sign up, go to [email or web address] . Click on \"Log in\" on the left side of the screen, which will take you to the Welcome page. Then click on \"Sign up Now\" on the right side of the page.     You will be asked to enter the access code listed below, as well as some personal information. Please follow the directions to create your username and password.     Your access code is: [email or web address]  Expires: 2019  1:00 PM     Your access code will  in 90 days. If you need help or a new code, please call your [facility] clinic or [phone].        Care EveryWhere ID     This is your Care EveryWhere ID. This could be used by other organizations to access your [facility] medical records  [id number]        Equal Access to Services     [facility] : Hadsalome Flaherty, wazacariasda luqadaha, qaybta kaalcharlie manzano . So [facility] [phone].    ATENCIÓN: Si habla español, tiene a garza disposición servicios gratuitos de asistencia lingüística. Llame al [phone].    We comply with applicable federal civil rights laws and [place] " laws. We do not discriminate on the basis of race, color, national origin, age, disability, sex, sexual orientation, or gender identity.            After Visit Summary       This is your record. Keep this with you and show to your community pharmacist(s) and doctor(s) at your next visit.

## 2018-10-07 NOTE — DISCHARGE INSTRUCTIONS
"  Near-Fainting: Vagal Reaction  Fainting (syncope) is a temporary loss of consciousness (passing out). It is associated with a loss of postural tone. Postural tone is the constant contraction of the muscles in your body to help keep your body upright. It also helps blood return towards the heart and brain. Syncope occurs when there is reduced blood flow to the brain due to this common vagal reaction. A vagal reaction is a reflex response that causes a sudden drop in your blood pressure, and your pulse to slow down. If the pulse is low enough, the blood pressure falls and causes fainting or near-fainting. Lying down usually stops the reaction very quickly.  These are symptoms of near-fainting:    Feeling lightheaded or like you are going to faint    Weak pulse    Nausea    Sweating    Blurred vision or feeling like your vision is \"blacking out\"    Palpitations    Chest pain    Trouble breathing    Cool and clammy skin  Causes for near-fainting include:    Sudden emotional stress like fear, pain, panic, sight of blood    Straining or overexertion, straining while using the toilet, coughing, sneezing    Standing up too quickly, or standing up for too long a time    Pregnancy  Home care  The following will help you care for yourself at home:    Rest today and go back to your normal activities as soon as you are feeling back to normal.    If you become light-headed or dizzy, lie down right away or sit with your head lowered between your knees.    Stay hydrated and do not skip meals.    Don't stand for long periods or stay in hot places    Do what you can to prevent constipation. If you bear down excessively when trying to have a bowel movement, this can trigger a vagal response  There may be other causes for a vagal response and near-syncope. For example, this can happen after open-heart surgery when the heart muscle is inflamed and irritated.  Check with your doctor to see if there is testing you need such as a " tilt-table test, heart rhythm monitoring, or blood tests. Review the medicines you take with your healthcare provider and pharmacist to be sure the symptoms you have are not a side effect of a medicine.  Follow-up care  Follow up with your healthcare provider, or as advised.   If you are having frequent episodes of near-syncope or vagal reactions, be cautious about activities such as driving that could harm yourself or others if you were to faint. Do not drive or operate heavy machinery if you are feeling like you may faint.  Call 911  Call 911 if any of these occur:    Another fainting spell occurs, and it is not explained by the common causes listed above    Fainting or loss of consciousness    Chest, arm, neck, jaw, back, or abdominal pain    Shortness of breath    Weakness, tingling, or numbness in one side of the face, one arm or leg    Slurred speech, confusion, trouble walking or seeing    Seizure    Blood in vomit or stools (black or red color)  When to seek medical advice  Call your healthcare provider right away if you have occasional mild lightheadedness, especially when standing up.  Date Last Reviewed: 6/1/2016 2000-2017 [employer]. [address], [place] [address]. All rights reserved. This information is not intended as a substitute for professional medical care. Always follow your healthcare professional's instructions.

## 2018-10-07 NOTE — ED AVS SNAPSHOT
[facility] [place]    [address]    [place] [address]    Phone:  [phone]    Fax:  [phone]                                       Karey Paulson   MRN: [id number]    Department:  [facility]   Date of Visit:  10/7/2018           After Visit Summary Signature Page     I have received my discharge instructions, and my questions have been answered. I have discussed any challenges I see with this plan with the nurse or doctor.    ..........................................................................................................................................  Patient/Patient Representative Signature      ..........................................................................................................................................  Patient Representative Print Name and Relationship to Patient    ..................................................               ................................................  Date                                   Time    ..........................................................................................................................................  Reviewed by Signature/Title    ...................................................              ..............................................  Date                                               Time          22EPIC Rev 08/18

## 2018-10-07 NOTE — ED AVS SNAPSHOT
"    [facility]: [phone]                                              INTERAGENCY TRANSFER FORM - LAB / IMAGING / EKG / EMG RESULTS   10/7/2018                    Hospital Admission Date: 10/7/2018  SHELLI WALTON   : 10/17/1924  Sex: Female        Attending Provider: Waqas Espana MD     Allergies:  Atorvastatin, Ciprofloxacin, Erythromycin, Lisinopril, Simvastatin, Sulfamethoxazole W/trimethoprim    Infection:  None   Service:  EMERGENCY ME    Ht:  1.676 m (5' 6\")   Wt:  64 kg (141 lb)   Admission Wt:  64 kg (141 lb)    BMI:  22.76 kg/m 2   BSA:  1.73 m 2            Patient PCP Information     Provider PCP Type    Byron Huerta MD General         Lab Results - 3 Days      Urine Microscopic [[id number]] (Abnormal)  Resulted: 10/07/18 1241, Result status: Final result    Ordering provider: Waqas Espana MD  10/07/18 1233 Resulting lab: [facility]    Specimen Information    Type Source Collected On     10/07/18 1233          Components       Value Reference Range Flag Lab   WBC Urine 0 - 5 OTO5^0 - 5 /HPF  [facility]   RBC Urine O - 2 OTO2^O - 2 /HPF  [facility]   Bacteria Urine Moderate NEG^Negative /HPF A [facility]   Amorphous Crystals Many NEG^Negative /HPF A [facility]            *UA reflex to Microscopic [[id number]] (Abnormal)  Resulted: 10/07/18 1238, Result status: Final result    Ordering provider: Waqas Espana MD  10/07/18 1109 Resulting lab: [facility]    Specimen Information    Type Source Collected On   Midstream Urine Urine catheter 10/07/18 1233          Components       Value Reference Range Flag Lab   Color Urine Yellow   [facility]   Appearance Urine Slightly Cloudy   [facility]   Glucose Urine Negative NEG^Negative mg/dL  [facility]   Bilirubin Urine Negative NEG^Negative  [facility]   Ketones Urine Negative NEG^Negative mg/dL  [facility]   Specific [place] Urine 1.010 1.000 - 1.030  [facility]   Blood " Urine Negative NEG^Negative  [facility]   pH Urine 8.0 5.0 - 9.0 pH  [facility]   Protein Albumin Urine Negative NEG^Negative mg/dL  [facility]   Urobilinogen Urine 1.0 0.2 - 1.0 EU/dL  [facility]   Nitrite Urine Negative NEG^Negative  [facility]   Leukocyte Esterase Urine Small NEG^Negative A [facility]   Source Midstream Urine   [facility]            Comprehensive metabolic panel [[id number]] (Abnormal)  Resulted: 10/07/18 1200, Result status: Final result    Ordering provider: Waqas Espana MD  10/07/18 1109 Resulting lab: [facility]    Specimen Information    Type Source Collected On   Blood  10/07/18 1132          Components       Value Reference Range Flag Lab   Sodium 138 134 - 144 mmol/L  [facility]   Potassium 3.4 3.5 - 5.1 mmol/L L [facility]   Chloride 110 98 - 107 mmol/L H [facility]   Carbon Dioxide 21 21 - 31 mmol/L  [facility]   Anion Gap 7 3 - 14 mmol/L  [facility]   Glucose 166 70 - 105 mg/dL H [facility]   Urea Nitrogen 31 7 - 25 mg/dL H [facility]   Creatinine 0.68 0.60 - 1.20 mg/dL  [facility]   GFR Estimate 81 >60 mL/min/1.7m2  [facility]   GFR Estimate If Black >90 >60 mL/min/1.7m2  [facility]   Calcium 9.5 8.6 - 10.3 mg/dL  [facility]   Bilirubin Total 0.5 0.3 - 1.0 mg/dL  [facility]   Albumin 3.6 3.5 - 5.7 g/dL  [facility]   Protein Total 6.2 6.4 - 8.9 g/dL L [facility]   Alkaline Phosphatase 41 34 - 104 U/L  [facility]   ALT 17 7 - 52 U/L  [facility]   AST 17 13 - 39 U/L  [facility]            Troponin I [[id number]]  Resulted: 10/07/18 1154, Result status: Final result    Ordering provider: Waqas Espana MD  10/07/18 1109 Resulting lab: [facility]    Specimen Information    Type Source Collected On   Blood  10/07/18 1132          Components       Value Reference Range Flag Lab   Troponin I ES <0.030 0.000 - 0.034 ug/L  [facility]            CBC with platelets differential [[id number]] (Abnormal)  Resulted: 10/07/18 1137, Result status:  Final result    Ordering provider: Waqas Espana MD  10/07/18 1109 Resulting lab: [facility]    Specimen Information    Type Source Collected On   Blood  10/07/18 1132          Components       Value Reference Range Flag Lab   WBC 10.9 4.0 - 11.0 10e9/L  [facility]   RBC Count 4.08 3.8 - 5.2 10e12/L  [facility]   Hemoglobin 11.9 11.7 - 15.7 g/dL  [facility]   Hematocrit 36.1 35.0 - 47.0 %  [facility]   MCV 89 78 - 100 fl  [facility]   MCH 29.2 26.5 - 33.0 pg  [facility]   MCHC 33.0 31.5 - 36.5 g/dL  [facility]   RDW 14.1 10.0 - 15.0 %  [facility]   Platelet Count 244 150 - 450 10e9/L  [facility]   Diff Method Automated Method   [facility]   % Neutrophils 83.3 %  [facility]   % Lymphocytes 7.5 %  [facility]   % Monocytes 7.1 %  [facility]   % Eosinophils 1.2 %  [facility]   % Basophils 0.6 %  [facility]   % Immature Granulocytes 0.3 %  [facility]   Absolute Neutrophil 9.1 1.6 - 8.3 10e9/L H [facility]   Absolute Lymphocytes 0.8 0.8 - 5.3 10e9/L  [facility]   Absolute Monocytes 0.8 0.0 - 1.3 10e9/L  [facility]   Absolute Eosinophils 0.1 0.0 - 0.7 10e9/L  [facility]   Absolute Basophils 0.1 0.0 - 0.2 10e9/L  [facility]   Abs Immature Granulocytes 0.0 0 - 0.4 10e9/L  [facility]            Testing Performed By     Lab - Abbreviation Name Director Address Valid Date Range    1743 - [facility] Unknown [address]  [facility] [address] 08/07/15 0948 - Present            Unresulted Labs     None         Imaging Results - 3 Days      XR Chest Port 1 View [[id number]]  Resulted: 10/07/18 1156, Result status: Final result    Ordering provider: Waqas Espana MD  10/07/18 1109 Resulted by: Enma Wild MD    Performed: 10/07/18 1127 - 10/07/18 1138 Resulting lab: RADIOLOGY RESULTS    Narrative:       PROCEDURE: XR CHEST PORT 1 VW 10/7/2018 11:38 AM    HISTORY: syncope;     COMPARISONS: 6/28/2017.    TECHNIQUE: Single view.    FINDINGS: Heart is  mildly enlarged but is stable in size. There is  some elongation of the thoracic aorta. No acute infiltrate or effusion  is seen.    There is chronic rotator cuff disease bilaterally.         Impression:       IMPRESSION: No acute disease.    BENJIE THACKER MD      Testing Performed By     Lab - Abbreviation Name Director Address Valid Date Range    104 - [facility] RADIOLOGY RESULTS Unknown Unknown 02/16/05 1553 - Present            Encounter-Level Documents:     There are no encounter-level documents.      Order-Level Documents:     There are no order-level documents.

## 2018-10-07 NOTE — ED TRIAGE NOTES
EMS Arrival Note  ________________________________  Karey Paulson is a 89 year old Female [facility]  Pre hospital clinical presentation per EMS personnel includes Patient was sitting with staff at [facility] and had passed out for about 20 seconds.  Patient complaining of nausea/dizziness and loss of bowel control.  Patient HR in the 40's per staff at [facility] .  Pre hospital personnel report vital signs of:  B/P 138/62; HR 60, RR 14;SpO2 98% RA  Pre Hospital Cardiac rhythm reported as Normal Sinus  Pre hospital care included: Medication: none:    Airway intact  Breathing Assessment Normal.    Circulation Assessment Normal.  Patient arrives with a 20 IV at his left arm with    Placed in room 902, gowned, warm blanket provided, side rails up,  ID verified and band placed, and call light within reach.

## 2018-10-07 NOTE — ED AVS SNAPSHOT
` `     [facility]: [phone]                 INTERAGENCY TRANSFER FORM - NOTES (H&P, Discharge Summary, Consults, Procedures, Therapies)   10/7/2018                    Hospital Admission Date: 10/7/2018  SHELLI PAULSON   : 10/17/1924  Sex: Female        Patient PCP Information     Provider PCP Type    Byron Huerta MD General      History & Physicals     No notes of this type exist for this encounter.      Discharge Summaries     No notes of this type exist for this encounter.      Consult Notes     No notes of this type exist for this encounter.         Progress Notes - Physician (Notes from 10/04/18 through 10/07/18)      ED Provider Notes by Waqas Espana MD at 10/7/2018 10:47 AM     Author:  Waqas Espana MD Service:  Emergency Medicine Author Type:  Physician    Filed:  10/7/2018 12:53 PM Date of Service:  10/7/2018 10:47 AM Creation Time:  10/7/2018 12:47 PM    Status:  Signed :  Waqas Espana MD (Physician)           History     Chief Complaint   Patient presents with     Loss of Consciousness     HPI  Shelli Paulson is a 89 year old female who was at [facility] this morning where she lives.  She felt flushed, warm, sweaty, nauseated, and then evidently may have passed out.  She became incontinent of stool and was evidently out for about 20sec.  HR at that time was in the 40s.  A classic constellation of symptoms suggesting vasovagal syncope, in my opinion.  What predicated her symptoms was an immediate preceding abdominal discomfort, although this was fleeting, and does not bother her now.    She feels fine now, and has not been unwell recently, according to her.     No hx of syncope or cardiac issues.  No hx of palpitations.    No new meds.    No fevers, runny nose, cough, Chest pain, SOB, abdominal issues, V/D, urinary symptoms    It appears in the PMH below, however, that she does have a hx of an acute MI in the remote past.    Problem List:     Patient Active Problem List    Diagnosis Date Noted     Primary insomnia 04/11/2018     Priority: Medium     Weakness of both lower extremities 03/27/2018     Priority: Medium     Essential hypertension 03/13/2018     Priority: Medium     S/P laparoscopic colectomy 11/14/2017     Priority: Medium     Diverticulosis of large intestine 10/22/2017     Priority: Medium     Overview:   S/p fle sig, 10/22/2017       Stricture of sigmoid colon (H) 10/22/2017     Priority: Medium     Overview:   S/p flex sig 10/22/2017       [place]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     Priority: Medium     Overview:        Recurrent UTI 10/21/2017     Priority: Medium     Overview:   2 in 2017, 10/13/2017 and 6/28/2017       Pancreatic cyst 10/20/2017     Priority: Medium     Overview:   Cystic nodules, multiple; s/p CT Abd Pelv       Gastroesophageal reflux disease without esophagitis 12/31/2015     Priority: Medium     ACP (advance care planning) 12/05/2013     Priority: Medium     Backache 02/24/2013     Priority: Medium     Actinic keratosis 04/18/2012     Priority: Medium     Osteoarthrosis 11/08/2011     Priority: Medium     Hyperlipidemia 01/29/2009     Priority: Medium     Overview:   Overview:   IMO Update 10/11       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     Priority: Medium        Past Medical History:    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      Acute myocardial infarction (H)      Cyst of pancreas      Diaphragmatic hernia without obstruction or gangrene      Diverticulosis of large intestine without perforation or abscess without bleeding      Edema      Epigastric pain      Essential (primary) hypertension      Gastro-esophageal reflux disease without esophagitis      Osteoarthritis      Other intestinal obstruction unspecified as to partial versus complete obstruction (CODE)      Personal history of malignant neoplasm of breast      Personal  history of other medical treatment (CODE)      Postmenopausal atrophic vaginitis      Sepsis (H)      Urinary tract infection      Urinary tract infection        Past Surgical History:    Past Surgical History:   Procedure Laterality Date     ANGIOPLASTY      12-,Angioplasty with 3 bare-metal stents RCA     APPENDECTOMY OPEN      1948,Appendectomy     CHOLECYSTECTOMY      1995,[facility]     EXTRACAPSULAR CATARACT EXTRATION WITH INTRAOCULAR LENS IMPLANT      12/2016,bilat     HYSTERECTOMY TOTAL ABDOMINAL      1967,[facility], ovaries remaining     MASTECTOMY SIMPLE      1993,[facility]     OTHER SURGICAL HISTORY      10/24/2017,[id number],OTHER,Ellen Arambula and Jairo       Family History:    Family History   Problem Relation Age of Onset     Breast Cancer No family hx of      Cancer-breast       Social History:  Marital Status:   [5]  Social History   Substance Use Topics     Smoking status: Never Smoker     Smokeless tobacco: Never Used     Alcohol use No        Medications:      acetaZOLAMIDE (DIAMOX) 250 MG tablet   amLODIPine (NORVASC) 10 MG tablet   ASPIRIN LOW DOSE 81 MG EC tablet   brimonidine (ALPHAGAN P) 0.1 % ophthalmic solution   fluorometholone (FML LIQUIFILM) 0.1 % ophthalmic susp   losartan (COZAAR) 50 MG tablet   melatonin 3 MG tablet   timolol (TIMOPTIC) 0.5 % ophthalmic solution   ALPHAGAN P 0.1 % ophthalmic solution   alum & mag hydroxide-simethicone (MYLANTA/MAALOX) 200-200-20 MG/5ML SUSP suspension   amLODIPine (NORVASC) 10 MG tablet   atenolol (TENORMIN) 25 MG tablet   COMBIGAN 0.2-0.5 % ophthalmic solution   dorzolamide-timolol (COSOPT) 2-0.5 % ophthalmic solution   hydrochlorothiazide (HYDRODIURIL) 25 MG tablet   latanoprost (XALATAN) 0.005 % ophthalmic solution   latanoprost (XALATAN) 0.005 % ophthalmic solution   MAPAP 500 MG tablet   order for DME   prednisoLONE acetate (PRED FORTE) 1 % ophthalmic susp   prednisoLONE acetate (PRED FORTE) 1 % ophthalmic susp   ranitidine (ZANTAC) 150 MG tablet  "  SIMBRINZA 1-0.2 % ophthalmic suspension   sucralfate (CARAFATE) 1 GM tablet   timolol (TIMOPTIC) 0.5 % ophthalmic solution   timolol (TIMOPTIC-XE) 0.5 % ophthalmic gel-form   timolol (TIMOPTIC-XE) 0.5 % ophthalmic gel-form   valsartan (DIOVAN) 80 MG tablet   VITAMIN D3 1000 UNITS tablet   [DISCONTINUED] valsartan (DIOVAN) 80 MG tablet         Review of Systems  As above  Physical Exam   BP: 134/58  Heart Rate: 61  Temp: 97  F (36.1  C)  Resp: 14  Height: 167.6 cm (5' 6\")  Weight: 64 kg (141 lb)  SpO2: 98 % (Simultaneous filing. User may not have seen previous data.)      Physical Exam  Well woman.  She remains asymptomatic in sinus rhythm throughout her stay.  Heart reg with known murmur.  Lungs clear.  No tachypnea.  abd soft, NT, ND, NABS, no pulsatile abdominal mass.  No DVt signs or ankle edema.    ED Course     ED Course     Procedures               Critical Care time:  none               Results for orders placed or performed during the hospital encounter of 10/07/18 (from the past 24 hour(s))   CBC with platelets differential   Result Value Ref Range    WBC 10.9 4.0 - 11.0 10e9/L    RBC Count 4.08 3.8 - 5.2 10e12/L    Hemoglobin 11.9 11.7 - 15.7 g/dL    Hematocrit 36.1 35.0 - 47.0 %    MCV 89 78 - 100 fl    MCH 29.2 26.5 - 33.0 pg    MCHC 33.0 31.5 - 36.5 g/dL    RDW 14.1 10.0 - 15.0 %    Platelet Count 244 150 - 450 10e9/L    Diff Method Automated Method     % Neutrophils 83.3 %    % Lymphocytes 7.5 %    % Monocytes 7.1 %    % Eosinophils 1.2 %    % Basophils 0.6 %    % Immature Granulocytes 0.3 %    Absolute Neutrophil 9.1 (H) 1.6 - 8.3 10e9/L    Absolute Lymphocytes 0.8 0.8 - 5.3 10e9/L    Absolute Monocytes 0.8 0.0 - 1.3 10e9/L    Absolute Eosinophils 0.1 0.0 - 0.7 10e9/L    Absolute Basophils 0.1 0.0 - 0.2 10e9/L    Abs Immature Granulocytes 0.0 0 - 0.4 10e9/L   Comprehensive metabolic panel   Result Value Ref Range    Sodium 138 134 - 144 mmol/L    Potassium 3.4 (L) 3.5 - 5.1 mmol/L    Chloride 110 " (H) 98 - 107 mmol/L    Carbon Dioxide 21 21 - 31 mmol/L    Anion Gap 7 3 - 14 mmol/L    Glucose 166 (H) 70 - 105 mg/dL    Urea Nitrogen 31 (H) 7 - 25 mg/dL    Creatinine 0.68 0.60 - 1.20 mg/dL    GFR Estimate 81 >60 mL/min/1.7m2    GFR Estimate If Black >90 >60 mL/min/1.7m2    Calcium 9.5 8.6 - 10.3 mg/dL    Bilirubin Total 0.5 0.3 - 1.0 mg/dL    Albumin 3.6 3.5 - 5.7 g/dL    Protein Total 6.2 (L) 6.4 - 8.9 g/dL    Alkaline Phosphatase 41 34 - 104 U/L    ALT 17 7 - 52 U/L    AST 17 13 - 39 U/L   Troponin I   Result Value Ref Range    Troponin I ES <0.030 0.000 - 0.034 ug/L   XR Chest Port 1 View    Narrative    PROCEDURE: XR CHEST PORT 1 VW 10/7/2018 11:38 AM    HISTORY: syncope;     COMPARISONS: 6/28/2017.    TECHNIQUE: Single view.    FINDINGS: Heart is mildly enlarged but is stable in size. There is  some elongation of the thoracic aorta. No acute infiltrate or effusion  is seen.    There is chronic rotator cuff disease bilaterally.         Impression    IMPRESSION: No acute disease.    BENJIE THACKER MD   *UA reflex to Microscopic   Result Value Ref Range    Color Urine Yellow     Appearance Urine Slightly Cloudy     Glucose Urine Negative NEG^Negative mg/dL    Bilirubin Urine Negative NEG^Negative    Ketones Urine Negative NEG^Negative mg/dL    Specific Gravity Urine 1.010 1.000 - 1.030    Blood Urine Negative NEG^Negative    pH Urine 8.0 5.0 - 9.0 pH    Protein Albumin Urine Negative NEG^Negative mg/dL    Urobilinogen Urine 1.0 0.2 - 1.0 EU/dL    Nitrite Urine Negative NEG^Negative    Leukocyte Esterase Urine Small (A) NEG^Negative    Source Midstream Urine    Urine Microscopic   Result Value Ref Range    WBC Urine 0 - 5 OTO5^0 - 5 /HPF    RBC Urine O - 2 OTO2^O - 2 /HPF    Bacteria Urine Moderate (A) NEG^Negative /HPF    Amorphous Crystals Many (A) NEG^Negative /HPF       Medications - No data to display    Assessments & Plan (with Medical Decision Making)     I have reviewed the nursing notes.    I have  reviewed the findings, diagnosis, plan and need for follow up with the patient.   EKG shows junctional rhythm, which I believe is erroneous, and is a sinus rhythm with no concerning ST or T changes.  CXR one view looks clear.      Diagnostics reassuring, no infectious cause noted.    At this time, I believe the patient had a painful episode of abdominal pain that precipitated a vasovagal event.  Her symptom constellation is classic.  Cardiogenic causes to syncope is obviously considered, but at this time, being monitored in the ER with no ectopy and no symptoms, I do not feel she needs to be admitted.  Will send back to the monitored setting of [facility] ER, and have her follow up back here to the ER today or tomorrow should there be a recurrence of syncope or near-syncope.      She is currently asymptomatic, has no concerns, and diagnostics reassuring with no evidence of cardiogenic causes to her syncope.        New Prescriptions    No medications on file       Final diagnoses:   Vasovagal syncope       10/7/2018   [facility][CP1.1]     Waqas Espana MD  10/07/18 1253  [CP1.2]     Revision History        User Key Date/Time User Provider Type Action    > CP1.2 10/7/2018 12:53 PM Waqas Espana MD Physician Sign     CP1.1 10/7/2018 12:47 PM Waqas Espana MD Physician             ED Notes by Gabriela Canales at 10/7/2018 11:57 AM     Author:  Gabriela Canales Service:  (none) Author Type:  Nursing Sta Asst    Filed:  10/7/2018 11:58 AM Date of Service:  10/7/2018 11:57 AM Creation Time:  10/7/2018 11:57 AM    Status:  Signed :  Gabriela Canales (Nursing Sta Asst)         Pt. Up to commode and unable to void.[BW1.1]      Revision History        User Key Date/Time User Provider Type Action    > BW1.1 10/7/2018 11:58 AM Gabriela Canales Nursing Sta Asst Sign            ED Notes by Ana Martin RN at 10/7/2018 11:00 AM     Author:  Ana Martin, RN  Service:  (none) Author Type:  Registered Nurse    Filed:  10/7/2018 11:09 AM Date of Service:  10/7/2018 11:00 AM Creation Time:  10/7/2018 11:09 AM    Status:  Signed :  Ana Martin RN (Registered Nurse)         MD at bedside.[JP1.1]     Revision History        User Key Date/Time User Provider Type Action    > JP1.1 10/7/2018 11:09 AM Ana Martin RN Registered Nurse Sign                  Procedure Notes     No notes of this type exist for this encounter.      Progress Notes - Therapies (Notes from 10/04/18 through 10/07/18)     No notes of this type exist for this encounter.

## 2018-10-12 ENCOUNTER — OFFICE VISIT (OUTPATIENT)
Dept: FAMILY MEDICINE | Facility: OTHER | Age: 83
End: 2018-10-12
Attending: FAMILY MEDICINE
Payer: MEDICARE

## 2018-10-12 VITALS
BODY MASS INDEX: 21.47 KG/M2 | HEART RATE: 52 BPM | SYSTOLIC BLOOD PRESSURE: 106 MMHG | DIASTOLIC BLOOD PRESSURE: 48 MMHG | WEIGHT: 133 LBS

## 2018-10-12 DIAGNOSIS — R55 SYNCOPE, UNSPECIFIED SYNCOPE TYPE: ICD-10-CM

## 2018-10-12 PROCEDURE — G0463 HOSPITAL OUTPT CLINIC VISIT: HCPCS

## 2018-10-12 PROCEDURE — 99214 OFFICE O/P EST MOD 30 MIN: CPT | Performed by: FAMILY MEDICINE

## 2018-10-12 RX ORDER — AMLODIPINE BESYLATE 5 MG/1
5 TABLET ORAL DAILY
Qty: 30 TABLET | Refills: 1 | COMMUNITY
Start: 2018-10-12 | End: 2018-10-26

## 2018-10-12 ASSESSMENT — PAIN SCALES - GENERAL: PAINLEVEL: NO PAIN (0)

## 2018-10-12 ASSESSMENT — ENCOUNTER SYMPTOMS
ACTIVITY CHANGE: 1
HEADACHES: 0
APPETITE CHANGE: 1
PALPITATIONS: 0
DIFFICULTY URINATING: 0
PARESTHESIAS: 0
FATIGUE: 1
DIZZINESS: 0
NUMBNESS: 0
SPEECH DIFFICULTY: 0
UNEXPECTED WEIGHT CHANGE: 0
COUGH: 0
FEVER: 0

## 2018-10-12 NOTE — MR AVS SNAPSHOT
After Visit Summary   10/12/2018    Karey Paulson    MRN: [id number]           Patient Information     Date Of Birth          10/17/1924        Visit Information        Provider Department      10/12/2018 9:00 AM Byron Huerta MD [facility]        Today's Diagnoses     Syncope, unspecified syncope type           Follow-ups after your visit        Your next 10 appointments already scheduled     Oct 16, 2018  9:00 AM CDT   US CAROTID BILATERAL with GHUS1   [facility]    [address]  [place] [address]   [phone]           How do I prepare for my exam? (Food and drink instructions) No Food and Drink Restrictions.  How do I prepare for my exam? (Other instructions) You do not need to do anything special to prepare for your exam.  What should I wear: Wear comfortable clothes.  How long does the exam take: Most ultrasounds take 30 to 60 minutes.  What should I bring: Bring a list of your medicines, including vitamins, minerals and over-the-counter drugs. It is safest to leave personal items at home.  Do I need a :  No  is needed.  What do I need to tell my doctor: Tell your doctor about any allergies you may have.  What should I do after the exam: No restrictions, You may resume normal activities.  What is this test: An ultrasound uses sound waves to make pictures of the body. Sound waves do not cause pain. The only discomfort may be the pressure of the wand against your skin or full bladder.  Who should I call with questions: If you have any questions, please call the Imaging Department where you will have your exam. Directions, parking instructions, and other information is available on our website, [email or web address].            Oct 16, 2018 10:15 AM CDT   ZIOPATCH MONITOR with GH RESPIRATORY THERAPY TECH   [facility]    [address]  [place] [address]   [phone]              Future tests that were ordered for you today     Open Future Orders        Priority Expected Expires Ordered    Zio Patch Holter Routine  2018 10/12/2018     Carotid Bilateral Routine  10/12/2019 10/12/2018            Who to contact     If you have questions or need follow up information about today's clinic visit or your schedule please contact [facility] directly at [phone].  Normal or non-critical lab and imaging results will be communicated to you by [employer], letter or phone within 4 business days after the clinic has received the results. If you do not hear from us within 7 days, please contact the clinic through [employer] or phone. If you have a critical or abnormal lab result, we will notify you by phone as soon as possible.  Submit refill requests through [facility] or call your pharmacy and they will forward the refill request to us. Please allow 3 business days for your refill to be completed.          Additional Information About Your Visit        [employer] Information     [facility] lets you send messages to your doctor, view your test results, renew your prescriptions, schedule appointments and more. To sign up, go to [email or web address] . Click on \"Log in\" on the left side of the screen, which will take you to the Welcome page. Then click on \"Sign up Now\" on the right side of the page.     You will be asked to enter the access code listed below, as well as some personal information. Please follow the directions to create your username and password.     Your access code is: [email or web address]  Expires: 2019  1:00 PM     Your access code will  in 90 days. If you need help or a new code, please call your [facility] clinic or [phone].        Care EveryWhere ID     This is your Care EveryWhere ID. This could be used by other organizations to access your [facility] medical records  [id number]        Your " Vitals Were     Pulse BMI (Body Mass Index)                52 21.47 kg/m2           Blood Pressure from Last 3 Encounters:   10/12/18 106/48   10/07/18 132/71   06/11/18 130/70    Weight from Last 3 Encounters:   10/12/18 133 lb (60.3 kg)   10/07/18 141 lb (64 kg)   06/11/18 136 lb (61.7 kg)                 Today's Medication Changes          These changes are accurate as of 10/12/18  1:10 PM.  If you have any questions, ask your nurse or doctor.               These medicines have changed or have updated prescriptions.        Dose/Directions    amLODIPine 5 MG tablet   Commonly known as:  NORVASC   This may have changed:  Another medication with the same name was removed. Continue taking this medication, and follow the directions you see here.   Used for:  Syncope, unspecified syncope type        Dose:  5 mg   Take 1 tablet (5 mg) by mouth daily   Quantity:  30 tablet   Refills:  1       brimonidine 0.1 % ophthalmic solution   Commonly known as:  ALPHAGAN P   This may have changed:  Another medication with the same name was removed. Continue taking this medication, and follow the directions you see here.        Dose:  1 drop   1 drop   Refills:  0       latanoprost 0.005 % ophthalmic solution   Commonly known as:  XALATAN   This may have changed:  Another medication with the same name was removed. Continue taking this medication, and follow the directions you see here.        PLACE 1 DROP IN THE RIGHT EYE NIGHTLY   Refills:  12       prednisoLONE acetate 1 % ophthalmic susp   Commonly known as:  PRED FORTE   This may have changed:  Another medication with the same name was removed. Continue taking this medication, and follow the directions you see here.        PLACE 1 DROP IN THE LEFT EYE FOUR TIMES DAILY   Refills:  3       timolol 0.5 % ophthalmic solution   Commonly known as:  TIMOPTIC   This may have changed:  Another medication with the same name was removed. Continue taking this medication, and follow the  directions you see here.        PLACE 1 DROP IN THE RIGHT EYE TWICE DAILY   Refills:  6         Stop taking these medicines if you haven't already. Please contact your care team if you have questions.     acetaZOLAMIDE 250 MG tablet   Commonly known as:  DIAMOX           alum & mag hydroxide-simethicone 200-200-20 MG/5ML Susp suspension   Commonly known as:  MYLANTA/MAALOX           atenolol 25 MG tablet   Commonly known as:  TENORMIN           dorzolamide-timolol 2-0.5 % ophthalmic solution   Commonly known as:  COSOPT           fluorometholone 0.1 % ophthalmic susp   Commonly known as:  FML LIQUIFILM           hydrochlorothiazide 25 MG tablet   Commonly known as:  HYDRODIURIL           sucralfate 1 GM tablet   Commonly known as:  CARAFATE           valsartan 80 MG tablet   Commonly known as:  DIOVAN                    Primary Care Provider Office Phone # Fax #    Byron Huerta [phone] [address] [facility] [address]        Equal Access to Services     [facility]: Hadii aad ku hadasho Soomaali, waaxda luqadaha, qaybta kaalmada adeegyada, waxay idiin hayserafinn esvin oakley . So [facility] [phone].    ATENCIÓN: Si cassandrala esphermilo, tiene a garza disposición servicios gratuitos de asistencia lingüística. Llame al [phone].    We comply with applicable federal civil rights laws and [place] laws. We do not discriminate on the basis of race, color, national origin, age, disability, sex, sexual orientation, or gender identity.            Thank you!     Thank you for choosing [facility]  for your care. Our goal is always to provide you with excellent care. Hearing back from our patients is one way we can continue to improve our services. Please take a few minutes to complete the written survey that you may receive in the mail after your visit with us. Thank you!             Your Updated Medication List - Protect others around you: Learn how to safely  use, store and throw away your medicines at www.disposemymeds.org.          This list is accurate as of 10/12/18  1:10 PM.  Always use your most recent med list.                   Brand Name Dispense Instructions for use Diagnosis    amLODIPine 5 MG tablet    NORVASC    30 tablet    Take 1 tablet (5 mg) by mouth daily    Syncope, unspecified syncope type       ASPIRIN LOW DOSE 81 MG EC tablet   Generic drug:  aspirin     90 tablet    TAKE 1 TABLET BY MOUTH EVERY EVENING    Hyperlipidemia, Essential hypertension, benign       brimonidine 0.1 % ophthalmic solution    ALPHAGAN P     1 drop        cholecalciferol 1000 UNIT tablet    vitamin D3     TAKE 1 TABLET BY MOUTH ONCE DAILY (IN THE MORNING)        COMBIGAN 0.2-0.5 % ophthalmic solution   Generic drug:  brimonidine-timolol      PLACE 1 DROP IN THE LEFT EYE TWICE DAILY        latanoprost 0.005 % ophthalmic solution    XALATAN     PLACE 1 DROP IN THE RIGHT EYE NIGHTLY        losartan 50 MG tablet    COZAAR    90 tablet    Take 1 tablet (50 mg) by mouth daily    Essential hypertension       MAPAP 500 MG tablet   Generic drug:  acetaminophen     129 tablet    TAKE 1 TABLET BY MOUTH FOUR TIMES DAILY AS NEEDED    Primary osteoarthritis involving multiple joints       melatonin 3 MG tablet     100 tablet    TAKE 1 TABLET BY MOUTH NIGHTLY AT BEDTIME    Primary insomnia       order for DME     1 each    Equipment being ordered: Wheelchair manual    Weakness of both lower extremities, Chronic bilateral low back pain without sciatica       prednisoLONE acetate 1 % ophthalmic susp    PRED FORTE     PLACE 1 DROP IN THE LEFT EYE FOUR TIMES DAILY        ranitidine 150 MG tablet    ZANTAC     Take 1 tablet (150 mg) by mouth 2 times daily        SIMBRINZA 1-0.2 % ophthalmic suspension   Generic drug:  brinzolamide-brimonidine      PLACE 1 DROP IN THE RIGHT EYE THREE TIMES DAILY        timolol 0.5 % ophthalmic solution    TIMOPTIC     PLACE 1 DROP IN THE RIGHT EYE TWICE DAILY

## 2018-10-12 NOTE — PROGRESS NOTES
SUBJECTIVE:   Karey Paulson is a 89 year old female who presents to clinic today for the following health issues: Follow-up syncope    HPI Comments: Problems with speech confusion unresponsiveness incontinence of bowel or bladder.  This comes and goes.  She also finds word finding a problem blank stares and repetitive questions.  Symptoms usually resolve on their own in a short period of time no longer than an hour.  She also was seen in the ER for a syncope episode.  She states that she was sitting when all of a sudden she had loss consciousness.  Was felt in the ER to have a vasovagal.  She is on blood pressure medication.  Recently her atenolol was discontinued.  She does have a fairly slow pulse at times.  Right now has generalized weakness.  No neurologic complaints such as slurred speech.        Patient Active Problem List    Diagnosis Date Noted     Syncope, unspecified syncope type 10/12/2018     Priority: Medium     Primary insomnia 04/11/2018     Priority: Medium     Weakness of both lower extremities 03/27/2018     Priority: Medium     Essential hypertension 03/13/2018     Priority: Medium     S/P laparoscopic colectomy 11/14/2017     Priority: Medium     Diverticulosis of large intestine 10/22/2017     Priority: Medium     Overview:   S/p fle sig, 10/22/2017       Stricture of sigmoid colon (H) 10/22/2017     Priority: Medium     Overview:   S/p flex sig 10/22/2017       [place]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     Priority: Medium     Overview:        Recurrent UTI 10/21/2017     Priority: Medium     Overview:   2 in 2017, 10/13/2017 and 6/28/2017       Pancreatic cyst 10/20/2017     Priority: Medium     Overview:   Cystic nodules, multiple; s/p CT Abd Pelv       Gastroesophageal reflux disease without esophagitis 12/31/2015     Priority: Medium     ACP (advance care planning) 12/05/2013     Priority: Medium     Backache 02/24/2013     Priority: Medium     Actinic keratosis 04/18/2012     Priority: Medium      Osteoarthrosis 2011     Priority: Medium     Hyperlipidemia 2009     Priority: Medium     Overview:   Overview:   IMO Update 10/11       Borderline glaucoma with ocular hypertension 2007     Priority: Medium     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     2017     Acute myocardial infarction (H)     2010     Cyst of pancreas     10/20/2017,Cystic nodules, multiple; s/p CT Abd Pelv     Diaphragmatic hernia without obstruction or gangrene     10/20/2017,s/p CT Abd/Pelv     Diverticulosis of large intestine without perforation or abscess without bleeding     10/22/2017,S/p fle sig, 10/22/2017     Edema     2011     Epigastric pain     2015     Essential (primary) hypertension     No Comments Provided     Gastro-esophageal reflux disease without esophagitis     2015     Osteoarthritis     2011     Other intestinal obstruction unspecified as to partial versus complete obstruction (CODE)     10/22/2017,S/p flex sig 10/22/2017     Personal history of malignant neoplasm of breast     Questionable 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     Personal history of other medical treatment (CODE)          Postmenopausal atrophic vaginitis     2012     Sepsis (H)     2017,E coli bacteremia; E coli UTI (resistant to Cipro)     Urinary tract infection     2017,Resistant to cipro     Urinary tract infection     10/21/2017,2 in , 10/13/2017 and 2017      Past Surgical History:   Procedure Laterality Date     ANGIOPLASTY      2010,Angioplasty with 3 bare-metal stents RCA     APPENDECTOMY OPEN      ,Appendectomy     CHOLECYSTECTOMY      ,Angelique     EXTRACAPSULAR CATARACT EXTRATION WITH INTRAOCULAR LENS IMPLANT      2016,bilat     HYSTERECTOMY TOTAL ABDOMINAL      ,RUEL, ovaries remaining     MASTECTOMY SIMPLE      ,Angelique     OTHER SURGICAL HISTORY      10/24/2017,[id number],OTHER,Ellen Arambula  and Jairo     Social History     Social History Narrative    ; living @  [facility]-moved to [facility] after surgery 10/2017.  1 son passed away at 66.     Current Outpatient Prescriptions   Medication Sig Dispense Refill     amLODIPine (NORVASC) 5 MG tablet Take 1 tablet (5 mg) by mouth daily 30 tablet 1     ASPIRIN LOW DOSE 81 MG EC tablet TAKE 1 TABLET BY MOUTH EVERY EVENING 90 tablet 2     COMBIGAN 0.2-0.5 % ophthalmic solution PLACE 1 DROP IN THE LEFT EYE TWICE DAILY  12     latanoprost (XALATAN) 0.005 % ophthalmic solution PLACE 1 DROP IN THE RIGHT EYE NIGHTLY  12     losartan (COZAAR) 50 MG tablet Take 1 tablet (50 mg) by mouth daily 90 tablet 2     MAPAP 500 MG tablet TAKE 1 TABLET BY MOUTH FOUR TIMES DAILY AS NEEDED 129 tablet 2     melatonin 3 MG tablet TAKE 1 TABLET BY MOUTH NIGHTLY AT BEDTIME 100 tablet 2     order for DME Equipment being ordered: Wheelchair manual 1 each 0     prednisoLONE acetate (PRED FORTE) 1 % ophthalmic susp PLACE 1 DROP IN THE LEFT EYE FOUR TIMES DAILY  3     ranitidine (ZANTAC) 150 MG tablet Take 1 tablet (150 mg) by mouth 2 times daily       SIMBRINZA 1-0.2 % ophthalmic suspension PLACE 1 DROP IN THE RIGHT EYE THREE TIMES DAILY  3     timolol (TIMOPTIC) 0.5 % ophthalmic solution PLACE 1 DROP IN THE RIGHT EYE TWICE DAILY  6     VITAMIN D3 1000 UNITS tablet TAKE 1 TABLET BY MOUTH ONCE DAILY (IN THE MORNING)  0     brimonidine (ALPHAGAN P) 0.1 % ophthalmic solution 1 drop       [DISCONTINUED] amLODIPine (NORVASC) 10 MG tablet Take 10 mg by mouth       [DISCONTINUED] amLODIPine (NORVASC) 10 MG tablet TAKE 1 TABLET BY MOUTH ONCE DAILY 90 tablet 3     [DISCONTINUED] latanoprost (XALATAN) 0.005 % ophthalmic solution Apply 1 drop to eye       Allergies   Allergen Reactions     Atorvastatin Other (See Comments) and Nausea and Vomiting     Myalgia     Ciprofloxacin Other (See Comments)     Erythromycin      Lisinopril Cough     Simvastatin Other (See Comments)      Achey muscles.      Sulfamethoxazole W/Trimethoprim Unknown     Nausea and shaking       Review of Systems   Constitutional: Positive for activity change, appetite change and fatigue. Negative for fever and unexpected weight change.   HENT: Positive for congestion.    Respiratory: Negative for cough.    Cardiovascular: Negative for chest pain and palpitations.   Endocrine: Negative for cold intolerance.   Genitourinary: Negative for difficulty urinating.   Neurological: Negative for dizziness, speech difficulty, numbness, headaches and paresthesias.        OBJECTIVE:     /48 (BP Location: Left arm, Patient Position: Sitting)  Pulse 52  Wt 133 lb (60.3 kg)  BMI 21.47 kg/m2  Body mass index is 21.47 kg/(m^2).  Physical Exam   Constitutional: She appears well-developed.   HENT:   Mucosa somewhat dry   Eyes: Pupils are equal, round, and reactive to light.   Cardiovascular: Normal rate.    Frequent extra beats and pauses   Pulmonary/Chest: Effort normal and breath sounds normal.   Abdominal: Soft.   Musculoskeletal: Normal range of motion.   Neurological: She is alert.   Skin: Skin is warm.       none     ASSESSMENT/PLAN:           ICD-10-CM    1. Syncope, unspecified syncope type R55 Zio Patch Holter     US Carotid Bilateral     amLODIPine (NORVASC) 5 MG tablet     Patient has a fairly  low blood pressure.  106/48 sitting.  Pulse is 52.  We will decrease her Norvasc down to 5 mg a day.  Would also like to follow-up patient's after her carotid ultrasound.  She may be having TIAs.  Syncope could also be related to a sick sinus syndrome and I have decided to proceed with a Ziehl patch.  S her oral more mucosas appears to be dry and I have encouraged fluids.  Plan is to discontinue her hypertensive medications are for blood pressure remains normal.  Arabella Jaeger  [facility]

## 2018-10-12 NOTE — NURSING NOTE
Patient here for ER follow up from 10/07/18 vasovagal syncope. Feeling better now since ER visit. Poppy Wang LPN .......................10/12/2018  9:11 AM

## 2018-10-13 ASSESSMENT — PATIENT HEALTH QUESTIONNAIRE - PHQ9: SUM OF ALL RESPONSES TO PHQ QUESTIONS 1-9: 0

## 2018-10-16 ENCOUNTER — HOSPITAL ENCOUNTER (OUTPATIENT)
Dept: RESPIRATORY THERAPY | Facility: OTHER | Age: 83
End: 2018-10-16
Attending: FAMILY MEDICINE
Payer: MEDICARE

## 2018-10-16 ENCOUNTER — HOSPITAL ENCOUNTER (OUTPATIENT)
Dept: ULTRASOUND IMAGING | Facility: OTHER | Age: 83
Discharge: HOME | End: 2018-10-16
Attending: FAMILY MEDICINE | Admitting: FAMILY MEDICINE
Payer: MEDICARE

## 2018-10-16 DIAGNOSIS — R55 SYNCOPE, UNSPECIFIED SYNCOPE TYPE: ICD-10-CM

## 2018-10-16 PROCEDURE — 93880 EXTRACRANIAL BILAT STUDY: CPT

## 2018-10-16 PROCEDURE — 40000275 ZZH STATISTIC RCP TIME EA 10 MIN

## 2018-10-16 PROCEDURE — 0296T ZIO PATCH HOLTER: CPT

## 2018-10-16 PROCEDURE — 0298T ZZC EXT ECG > 48HR TO 21 DAY REVIEW AND INTERPRETATN: CPT | Performed by: INTERNAL MEDICINE

## 2018-10-26 DIAGNOSIS — I10 ESSENTIAL (PRIMARY) HYPERTENSION: Primary | ICD-10-CM

## 2018-10-30 RX ORDER — AMLODIPINE BESYLATE 5 MG/1
5 TABLET ORAL DAILY
Qty: 90 TABLET | Refills: 3 | Status: SHIPPED | OUTPATIENT
Start: 2018-10-30 | End: 2018-01-01

## 2018-10-30 NOTE — TELEPHONE ENCOUNTER
[facility] #[id number] sent Rx request for the following:     amLODIPine (NORVASC) 5 MG tablet  Take 1 tablet (5 mg) by mouth daily  Medication is reported/historical on 10/12/18    Last Written Prescription:  amLODIPine (NORVASC) 10 MG tablet (Discontinued) 90 tablet 3 3/22/2018 10/12/2018 No   Sig: TAKE 1 TABLET BY MOUTH ONCE DAILY   [facility] - [place], [place] - [address]    Last Office Visit: 10/12/18  Future Office visit: None.    Per LOV notes on 10/12:  Patient has a fairly  low blood pressure. 106/48 sitting. Pulse is 52.  We will decrease her Norvasc down to 5 mg a day.    Prescription requested, approved per [facility] Refill Protocol, for 90 days and 3 refills at this time. Kelly Conner RN .............. 10/30/2018  10:34 AM

## 2018-11-05 ENCOUNTER — HOSPITAL ENCOUNTER (EMERGENCY)
Facility: OTHER | Age: 83
Discharge: HOME | End: 2018-11-05
Attending: PHYSICIAN ASSISTANT | Admitting: PHYSICIAN ASSISTANT
Payer: MEDICARE

## 2018-11-05 ENCOUNTER — APPOINTMENT (OUTPATIENT)
Dept: GENERAL RADIOLOGY | Facility: OTHER | Age: 83
End: 2018-11-05
Attending: PHYSICIAN ASSISTANT
Payer: MEDICARE

## 2018-11-05 VITALS
RESPIRATION RATE: 14 BRPM | WEIGHT: 133 LBS | TEMPERATURE: 96.8 F | DIASTOLIC BLOOD PRESSURE: 71 MMHG | OXYGEN SATURATION: 94 % | BODY MASS INDEX: 21.38 KG/M2 | HEIGHT: 66 IN | SYSTOLIC BLOOD PRESSURE: 156 MMHG

## 2018-11-05 DIAGNOSIS — N30.00 ACUTE CYSTITIS WITHOUT HEMATURIA: ICD-10-CM

## 2018-11-05 DIAGNOSIS — M62.81 GENERALIZED MUSCLE WEAKNESS: ICD-10-CM

## 2018-11-05 DIAGNOSIS — R55 SYNCOPE, UNSPECIFIED SYNCOPE TYPE: ICD-10-CM

## 2018-11-05 LAB
ALBUMIN SERPL-MCNC: 3.8 G/DL (ref 3.5–5.7)
ALBUMIN UR-MCNC: NEGATIVE MG/DL
ALP SERPL-CCNC: 50 U/L (ref 34–104)
ALT SERPL W P-5'-P-CCNC: 11 U/L (ref 7–52)
ANION GAP SERPL CALCULATED.3IONS-SCNC: 9 MMOL/L (ref 3–14)
APPEARANCE UR: CLEAR
AST SERPL W P-5'-P-CCNC: 15 U/L (ref 13–39)
BACTERIA #/AREA URNS HPF: ABNORMAL /HPF
BASOPHILS # BLD AUTO: 0.1 10E9/L (ref 0–0.2)
BASOPHILS NFR BLD AUTO: 0.8 %
BILIRUB SERPL-MCNC: 0.5 MG/DL (ref 0.3–1)
BILIRUB UR QL STRIP: NEGATIVE
BUN SERPL-MCNC: 26 MG/DL (ref 7–25)
CALCIUM SERPL-MCNC: 9.5 MG/DL (ref 8.6–10.3)
CHLORIDE SERPL-SCNC: 109 MMOL/L (ref 98–107)
CO2 SERPL-SCNC: 20 MMOL/L (ref 21–31)
COLOR UR AUTO: YELLOW
CREAT SERPL-MCNC: 0.69 MG/DL (ref 0.6–1.2)
DIFFERENTIAL METHOD BLD: NORMAL
EOSINOPHIL # BLD AUTO: 0.1 10E9/L (ref 0–0.7)
EOSINOPHIL NFR BLD AUTO: 1.7 %
ERYTHROCYTE [DISTWIDTH] IN BLOOD BY AUTOMATED COUNT: 14.2 % (ref 10–15)
GFR SERPL CREATININE-BSD FRML MDRD: 79 ML/MIN/1.7M2
GLUCOSE SERPL-MCNC: 108 MG/DL (ref 70–105)
GLUCOSE UR STRIP-MCNC: NEGATIVE MG/DL
HCT VFR BLD AUTO: 38.2 % (ref 35–47)
HGB BLD-MCNC: 12.5 G/DL (ref 11.7–15.7)
HGB UR QL STRIP: NEGATIVE
IMM GRANULOCYTES # BLD: 0 10E9/L (ref 0–0.4)
IMM GRANULOCYTES NFR BLD: 0.2 %
INR PPP: 0.98 (ref 0–1.3)
KETONES UR STRIP-MCNC: NEGATIVE MG/DL
LACTATE SERPL-SCNC: 1 MMOL/L (ref 0.5–2.2)
LEUKOCYTE ESTERASE UR QL STRIP: ABNORMAL
LYMPHOCYTES # BLD AUTO: 1.1 10E9/L (ref 0.8–5.3)
LYMPHOCYTES NFR BLD AUTO: 12.8 %
MCH RBC QN AUTO: 29 PG (ref 26.5–33)
MCHC RBC AUTO-ENTMCNC: 32.7 G/DL (ref 31.5–36.5)
MCV RBC AUTO: 89 FL (ref 78–100)
MONOCYTES # BLD AUTO: 0.8 10E9/L (ref 0–1.3)
MONOCYTES NFR BLD AUTO: 9 %
NEUTROPHILS # BLD AUTO: 6.3 10E9/L (ref 1.6–8.3)
NEUTROPHILS NFR BLD AUTO: 75.5 %
NITRATE UR QL: POSITIVE
NT-PROBNP SERPL-MCNC: 55 PG/ML (ref 0–100)
PH UR STRIP: 7 PH (ref 5–9)
PLATELET # BLD AUTO: 256 10E9/L (ref 150–450)
POTASSIUM SERPL-SCNC: 3.9 MMOL/L (ref 3.5–5.1)
PROT SERPL-MCNC: 6.6 G/DL (ref 6.4–8.9)
RBC # BLD AUTO: 4.31 10E12/L (ref 3.8–5.2)
RBC #/AREA URNS AUTO: ABNORMAL /HPF
SODIUM SERPL-SCNC: 138 MMOL/L (ref 134–144)
SOURCE: ABNORMAL
SP GR UR STRIP: 1.01 (ref 1–1.03)
TROPONIN I SERPL-MCNC: <0.03 UG/L (ref 0–0.03)
UROBILINOGEN UR STRIP-ACNC: 0.2 EU/DL (ref 0.2–1)
WBC # BLD AUTO: 8.4 10E9/L (ref 4–11)
WBC #/AREA URNS AUTO: ABNORMAL /HPF

## 2018-11-05 PROCEDURE — 25000128 H RX IP 250 OP 636: Performed by: PHYSICIAN ASSISTANT

## 2018-11-05 PROCEDURE — 99285 EMERGENCY DEPT VISIT HI MDM: CPT | Mod: 25 | Performed by: PHYSICIAN ASSISTANT

## 2018-11-05 PROCEDURE — 93010 ELECTROCARDIOGRAM REPORT: CPT | Performed by: INTERNAL MEDICINE

## 2018-11-05 PROCEDURE — 71045 X-RAY EXAM CHEST 1 VIEW: CPT

## 2018-11-05 PROCEDURE — 85610 PROTHROMBIN TIME: CPT | Performed by: PHYSICIAN ASSISTANT

## 2018-11-05 PROCEDURE — 36415 COLL VENOUS BLD VENIPUNCTURE: CPT | Performed by: PHYSICIAN ASSISTANT

## 2018-11-05 PROCEDURE — 99284 EMERGENCY DEPT VISIT MOD MDM: CPT | Mod: Z6 | Performed by: PHYSICIAN ASSISTANT

## 2018-11-05 PROCEDURE — 87040 BLOOD CULTURE FOR BACTERIA: CPT | Performed by: PHYSICIAN ASSISTANT

## 2018-11-05 PROCEDURE — A9270 NON-COVERED ITEM OR SERVICE: HCPCS | Mod: GY | Performed by: PHYSICIAN ASSISTANT

## 2018-11-05 PROCEDURE — 85025 COMPLETE CBC W/AUTO DIFF WBC: CPT | Performed by: PHYSICIAN ASSISTANT

## 2018-11-05 PROCEDURE — 96374 THER/PROPH/DIAG INJ IV PUSH: CPT | Performed by: PHYSICIAN ASSISTANT

## 2018-11-05 PROCEDURE — 84484 ASSAY OF TROPONIN QUANT: CPT | Performed by: PHYSICIAN ASSISTANT

## 2018-11-05 PROCEDURE — 93005 ELECTROCARDIOGRAM TRACING: CPT | Performed by: PHYSICIAN ASSISTANT

## 2018-11-05 PROCEDURE — 80053 COMPREHEN METABOLIC PANEL: CPT | Performed by: PHYSICIAN ASSISTANT

## 2018-11-05 PROCEDURE — 87088 URINE BACTERIA CULTURE: CPT | Performed by: PHYSICIAN ASSISTANT

## 2018-11-05 PROCEDURE — 83880 ASSAY OF NATRIURETIC PEPTIDE: CPT | Performed by: PHYSICIAN ASSISTANT

## 2018-11-05 PROCEDURE — 81001 URINALYSIS AUTO W/SCOPE: CPT | Performed by: PHYSICIAN ASSISTANT

## 2018-11-05 PROCEDURE — 87040 BLOOD CULTURE FOR BACTERIA: CPT | Mod: 91 | Performed by: PHYSICIAN ASSISTANT

## 2018-11-05 PROCEDURE — 96361 HYDRATE IV INFUSION ADD-ON: CPT | Performed by: PHYSICIAN ASSISTANT

## 2018-11-05 PROCEDURE — 87086 URINE CULTURE/COLONY COUNT: CPT | Performed by: PHYSICIAN ASSISTANT

## 2018-11-05 PROCEDURE — 83605 ASSAY OF LACTIC ACID: CPT | Performed by: PHYSICIAN ASSISTANT

## 2018-11-05 PROCEDURE — 25000132 ZZH RX MED GY IP 250 OP 250 PS 637: Mod: GY | Performed by: PHYSICIAN ASSISTANT

## 2018-11-05 RX ORDER — ONDANSETRON 2 MG/ML
4 INJECTION INTRAMUSCULAR; INTRAVENOUS EVERY 30 MIN PRN
Status: DISCONTINUED | OUTPATIENT
Start: 2018-11-05 | End: 2018-11-05 | Stop reason: HOSPADM

## 2018-11-05 RX ORDER — SODIUM CHLORIDE 9 MG/ML
INJECTION, SOLUTION INTRAVENOUS CONTINUOUS
Status: DISCONTINUED | OUTPATIENT
Start: 2018-11-05 | End: 2018-11-05 | Stop reason: HOSPADM

## 2018-11-05 RX ADMIN — ONDANSETRON 4 MG: 2 INJECTION INTRAMUSCULAR; INTRAVENOUS at 10:51

## 2018-11-05 RX ADMIN — AMOXICILLIN AND CLAVULANATE POTASSIUM 1 TABLET: 875; 125 TABLET, FILM COATED ORAL at 13:26

## 2018-11-05 RX ADMIN — SODIUM CHLORIDE: 9 INJECTION, SOLUTION INTRAVENOUS at 10:50

## 2018-11-05 ASSESSMENT — ENCOUNTER SYMPTOMS
FATIGUE: 1
ARTHRALGIAS: 0
COLOR CHANGE: 0
NAUSEA: 0
WEAKNESS: 1
SHORTNESS OF BREATH: 0
NECK STIFFNESS: 0
CHILLS: 0
VOMITING: 0
DIFFICULTY URINATING: 0
DIARRHEA: 0
ABDOMINAL PAIN: 0
HEADACHES: 0
EYE REDNESS: 0
APPETITE CHANGE: 0
CONSTIPATION: 0
CONFUSION: 0
FEVER: 0

## 2018-11-05 NOTE — ED AVS SNAPSHOT
[facility]    [address]    [place] [address]    Phone:  [phone]    Fax:  [phone]                                       Karey Paulson   MRN: [id number]    Department:  [facility]   Date of Visit:  11/5/2018           Patient Information     Date Of Birth          10/17/1924        Your diagnoses for this visit were:     Acute cystitis without hematuria     Generalized muscle weakness     Syncope, unspecified syncope type        You were seen by Rian Moore PA-C.      Follow-up Information     Follow up with Byron Huerta MD. Schedule an appointment as soon as possible for a visit in 1 week.    Specialty:  Family Practice    Contact information:    [address]  [place] [address] [phone]        Discharge References/Attachments     SYNCOPE, CAUSES OF (ENGLISH)    URINARY TRACT INFECTIONS (UTIS), UNDERSTANDING (ENGLISH)      24 Hour Appointment Hotline     To schedule an appointment at [facility], please call [phone]. If you don't have a family doctor or clinic, we will help you find one. [facility] clinics are conveniently located to serve the needs of you and your family.           Review of your medicines      START taking        Dose / Directions Last dose taken    amoxicillin-clavulanate 875-125 MG per tablet   Commonly known as:  AUGMENTIN   Dose:  1 tablet   Quantity:  20 tablet        Take 1 tablet by mouth 2 times daily for 10 days   Refills:  0          Our records show that you are taking the medicines listed below. If these are incorrect, please call your family doctor or clinic.        Dose / Directions Last dose taken    amLODIPine 5 MG tablet   Commonly known as:  NORVASC   Dose:  5 mg   Quantity:  90 tablet        Take 1 tablet (5 mg) by mouth daily   Refills:  3        ASPIRIN LOW DOSE 81 MG EC tablet   Quantity:  90 tablet   Generic drug:  aspirin        TAKE 1 TABLET BY MOUTH EVERY EVENING    Refills:  2        brimonidine 0.1 % ophthalmic solution   Commonly known as:  ALPHAGAN P   Dose:  1 drop        1 drop   Refills:  0        cholecalciferol 1000 UNIT tablet   Commonly known as:  vitamin D3        TAKE 1 TABLET BY MOUTH ONCE DAILY (IN THE MORNING)   Refills:  0        COMBIGAN 0.2-0.5 % ophthalmic solution   Generic drug:  brimonidine-timolol        PLACE 1 DROP IN THE LEFT EYE TWICE DAILY   Refills:  12        latanoprost 0.005 % ophthalmic solution   Commonly known as:  XALATAN        PLACE 1 DROP IN THE RIGHT EYE NIGHTLY   Refills:  12        losartan 50 MG tablet   Commonly known as:  COZAAR   Dose:  50 mg   Quantity:  90 tablet        Take 1 tablet (50 mg) by mouth daily   Refills:  2        MAPAP 500 MG tablet   Quantity:  129 tablet   Generic drug:  acetaminophen        TAKE 1 TABLET BY MOUTH FOUR TIMES DAILY AS NEEDED   Refills:  2        melatonin 3 MG tablet   Quantity:  100 tablet        TAKE 1 TABLET BY MOUTH NIGHTLY AT BEDTIME   Refills:  2        order for DME   Quantity:  1 each        Equipment being ordered: Wheelchair manual   Refills:  0        prednisoLONE acetate 1 % ophthalmic susp   Commonly known as:  PRED FORTE        PLACE 1 DROP IN THE LEFT EYE FOUR TIMES DAILY   Refills:  3        ranitidine 150 MG tablet   Commonly known as:  ZANTAC   Dose:  150 mg        Take 1 tablet (150 mg) by mouth 2 times daily   Refills:  0        SIMBRINZA 1-0.2 % ophthalmic suspension   Generic drug:  brinzolamide-brimonidine        PLACE 1 DROP IN THE RIGHT EYE THREE TIMES DAILY   Refills:  3        timolol 0.5 % ophthalmic solution   Commonly known as:  TIMOPTIC        PLACE 1 DROP IN THE RIGHT EYE TWICE DAILY   Refills:  6                Prescriptions were sent or printed at these locations (1 Prescription)                   Other Prescriptions                Printed at Department/Unit printer (1 of 1)         amoxicillin-clavulanate (AUGMENTIN) 875-125 MG per tablet               "  Procedures and tests performed during your visit     Procedure/Test Number of Times Performed    *UA reflex to Microscopic 1    Blood culture 2    CBC with platelets differential 1    Comprehensive metabolic panel 1    EKG 12-lead, tracing only 1    INR 1    Lactic acid 1    Nt probnp inpatient (BNP) 1    Troponin I 1    Urine Culture Aerobic Bacterial 1    Urine Microscopic 1    XR Chest Port 1 View 1      Orders Needing Specimen Collection     None      Pending Results     Date and Time Order Name Status Description    11/5/2018 1041 Blood culture In process     11/5/2018 1041 Blood culture In process             Pending Culture Results     Date and Time Order Name Status Description    11/5/2018 1041 Blood culture In process     11/5/2018 1041 Blood culture In process             Pending Results Instructions     If you had any lab results that were not finalized at the time of your Discharge, you can call the ED Lab Result RN at [phone]. You will be contacted by this team for any positive Lab results or changes in treatment. The nurses are available 7 days a week from 10A to 6:30P.  You can leave a message 24 hours per day and they will return your call.        Thank you for choosing [facility]       Thank you for choosing [facility] for your care. Our goal is always to provide you with excellent care. Hearing back from our patients is one way we can continue to improve our services. Please take a few minutes to complete the written survey that you may receive in the mail after you visit with us. Thank you!        [employer] Information     [employer] lets you send messages to your doctor, view your test results, renew your prescriptions, schedule appointments and more. To sign up, go to [email or web address] . Click on \"Log in\" on the left side of the screen, which will take you to the Welcome page. Then click on \"Sign up Now\" on the right side of the page.     You will be asked to enter the access code listed " below, as well as some personal information. Please follow the directions to create your username and password.     Your access code is: [email or web address]  Expires: 2019 12:00 PM     Your access code will  in 90 days. If you need help or a new code, please call your [facility] clinic or [phone].        Care EveryWhere ID     This is your Care EveryWhere ID. This could be used by other organizations to access your [facility] medical records  [id number]        Equal Access to Services     SAI RASHID : Maksim garibay Solizbeth, wadonald olsen, qaisaac kaalannel palmer, charlie oakley . So [facility] [phone].    ATENCIÓN: Si habla español, tiene a garza disposición servicios gratuitos de asistencia lingüística. Llame al [phone].    We comply with applicable federal civil rights laws and [place] laws. We do not discriminate on the basis of race, color, national origin, age, disability, sex, sexual orientation, or gender identity.            After Visit Summary       This is your record. Keep this with you and show to your community pharmacist(s) and doctor(s) at your next visit.

## 2018-11-05 NOTE — ED AVS SNAPSHOT
[facility]    [address]    [place] [address]    Phone:  [phone]    Fax:  [phone]                                       Karey Paulson   MRN: [id number]    Department:  [facility]   Date of Visit:  11/5/2018           After Visit Summary Signature Page     I have received my discharge instructions, and my questions have been answered. I have discussed any challenges I see with this plan with the nurse or doctor.    ..........................................................................................................................................  Patient/Patient Representative Signature      ..........................................................................................................................................  Patient Representative Print Name and Relationship to Patient    ..................................................               ................................................  Date                                   Time    ..........................................................................................................................................  Reviewed by Signature/Title    ...................................................              ..............................................  Date                                               Time          22EPIC Rev 08/18

## 2018-11-05 NOTE — ED PROVIDER NOTES
History     Chief Complaint   Patient presents with     Abdominal Pain     HPI Comments: This is a 89-year-old female who resides in assisted living facility, [facility].  EMS was called by staff there who reported that the patient had no pulse for approximately a minute.  Staff reported that the patient had been complaining of shortness of breath and heart problems.  When medics arrived the patient alert and oriented.  Patient was complaining only of some lower abdominal pain which waxed and waned.  Was noted that she was incontinent of urine.    The history is provided by the patient and the EMS personnel.         Problem List:    Patient Active Problem List    Diagnosis Date Noted     Syncope, unspecified syncope type 10/12/2018     Priority: Medium     Primary insomnia 04/11/2018     Priority: Medium     Weakness of both lower extremities 03/27/2018     Priority: Medium     Essential (primary) hypertension 03/13/2018     Priority: Medium     S/P laparoscopic colectomy 11/14/2017     Priority: Medium     Diverticulosis of large intestine 10/22/2017     Priority: Medium     Overview:   S/p fle sig, 10/22/2017       Stricture of sigmoid colon (H) 10/22/2017     Priority: Medium     Overview:   S/p flex sig 10/22/2017       [place]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     Priority: Medium     Overview:        Recurrent UTI 10/21/2017     Priority: Medium     Overview:   2 in 2017, 10/13/2017 and 6/28/2017       Pancreatic cyst 10/20/2017     Priority: Medium     Overview:   Cystic nodules, multiple; s/p CT Abd Pelv       Gastroesophageal reflux disease without esophagitis 12/31/2015     Priority: Medium     ACP (advance care planning) 12/05/2013     Priority: Medium     Backache 02/24/2013     Priority: Medium     Actinic keratosis 04/18/2012     Priority: Medium     Osteoarthrosis 11/08/2011     Priority: Medium     Hyperlipidemia 01/29/2009     Priority: Medium     Overview:   Overview:   IMO Update 10/11        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     Priority: Medium        Past Medical History:    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      Acute myocardial infarction (H)      Cyst of pancreas      Diaphragmatic hernia without obstruction or gangrene      Diverticulosis of large intestine without perforation or abscess without bleeding      Edema      Epigastric pain      Essential (primary) hypertension      Gastro-esophageal reflux disease without esophagitis      Osteoarthritis      Other intestinal obstruction unspecified as to partial versus complete obstruction (CODE)      Personal history of malignant neoplasm of breast      Personal history of other medical treatment (CODE)      Postmenopausal atrophic vaginitis      Sepsis (H)      Urinary tract infection      Urinary tract infection        Past Surgical History:    Past Surgical History:   Procedure Laterality Date     ANGIOPLASTY      12-,Angioplasty with 3 bare-metal stents RCA     APPENDECTOMY OPEN      1948,Appendectomy     CHOLECYSTECTOMY      1995,[place]     EXTRACAPSULAR CATARACT EXTRATION WITH INTRAOCULAR LENS IMPLANT      12/2016,bilat     HYSTERECTOMY TOTAL ABDOMINAL      1967,[facility], ovaries remaining     MASTECTOMY SIMPLE      1993,[place]     OTHER SURGICAL HISTORY      10/24/2017,872100,OTHER,Ellen Arambula and Jairo       Family History:    Family History   Problem Relation Age of Onset     Breast Cancer No family hx of      Cancer-breast       Social History:  Marital Status:   [5]  Social History   Substance Use Topics     Smoking status: Never Smoker     Smokeless tobacco: Never Used     Alcohol use No        Medications:      amLODIPine (NORVASC) 5 MG tablet   ASPIRIN LOW DOSE 81 MG EC tablet   latanoprost (XALATAN) 0.005 % ophthalmic solution   losartan (COZAAR) 50 MG tablet   MAPAP 500 MG tablet   melatonin 3 MG tablet   order for DME   prednisoLONE  "acetate (PRED FORTE) 1 % ophthalmic susp   ranitidine (ZANTAC) 150 MG tablet   SIMBRINZA 1-0.2 % ophthalmic suspension   timolol (TIMOPTIC) 0.5 % ophthalmic solution   VITAMIN D3 1000 UNITS tablet   brimonidine (ALPHAGAN P) 0.1 % ophthalmic solution   COMBIGAN 0.2-0.5 % ophthalmic solution         Review of Systems   Constitutional: Positive for fatigue. Negative for appetite change, chills and fever.   HENT: Negative for congestion.    Eyes: Negative for redness.   Respiratory: Negative for shortness of breath.    Cardiovascular: Negative for chest pain.   Gastrointestinal: Negative for abdominal pain, constipation, diarrhea, nausea and vomiting.   Genitourinary: Negative for difficulty urinating.   Musculoskeletal: Negative for arthralgias and neck stiffness.   Skin: Negative for color change.   Neurological: Positive for syncope and weakness. Negative for headaches.   Psychiatric/Behavioral: Negative for confusion.       Physical Exam   Temp: 96.8  F (36  C)  Height: 167.6 cm (5' 6\")  Weight: 60.3 kg (133 lb)      Physical Exam   Constitutional: She is oriented to person, place, and time. No distress.   HENT:   Head: Atraumatic.   Mouth/Throat: Oropharynx is clear and moist. No oropharyngeal exudate.   Eyes: Pupils are equal, round, and reactive to light. No scleral icterus.   Cardiovascular: Normal heart sounds and intact distal pulses.    Pulmonary/Chest: Breath sounds normal. No respiratory distress.   Lung sounds decreased throughout.   Abdominal: Soft. Bowel sounds are normal. There is no tenderness.   Musculoskeletal: She exhibits no edema or tenderness.   Neurological: She is alert and oriented to person, place, and time.   Skin: Skin is warm. No rash noted. She is not diaphoretic.       ED Course     ED Course     Procedures               EKG Interpretation:      Interpreted by Rian Kennedy  Time reviewed: 1048  Symptoms at time of EKG:   Syncopal episode  Rhythm: 1 degree AV block  Rate: " 50-60  Axis: Normal  Ectopy: none  Conduction: 1st degree AV block  ST Segments/ T Waves: Septal infarct, age undetermined, possible inferior infarct, age undetermined.  T wave inversion in V1 septal infarct, age undetermined.  Possible inferior infarct, age undetermined.  T wave inversion in V1 which is a change from her previous EKG on October 7, 2018  Q Waves: none  Comparison to prior: Changes as stated to her EKG on 10/7/2018    Clinical Impression: non-specific EKG    Results for orders placed or performed during the hospital encounter of 11/05/18 (from the past 24 hour(s))   Comprehensive metabolic panel   Result Value Ref Range    Sodium 138 134 - 144 mmol/L    Potassium 3.9 3.5 - 5.1 mmol/L    Chloride 109 (H) 98 - 107 mmol/L    Carbon Dioxide 20 (L) 21 - 31 mmol/L    Anion Gap 9 3 - 14 mmol/L    Glucose 108 (H) 70 - 105 mg/dL    Urea Nitrogen 26 (H) 7 - 25 mg/dL    Creatinine 0.69 0.60 - 1.20 mg/dL    GFR Estimate 79 >60 mL/min/1.7m2    GFR Estimate If Black >90 >60 mL/min/1.7m2    Calcium 9.5 8.6 - 10.3 mg/dL    Bilirubin Total 0.5 0.3 - 1.0 mg/dL    Albumin 3.8 3.5 - 5.7 g/dL    Protein Total 6.6 6.4 - 8.9 g/dL    Alkaline Phosphatase 50 34 - 104 U/L    ALT 11 7 - 52 U/L    AST 15 13 - 39 U/L   INR   Result Value Ref Range    INR 0.98 0 - 1.3   CBC with platelets differential   Result Value Ref Range    WBC 8.4 4.0 - 11.0 10e9/L    RBC Count 4.31 3.8 - 5.2 10e12/L    Hemoglobin 12.5 11.7 - 15.7 g/dL    Hematocrit 38.2 35.0 - 47.0 %    MCV 89 78 - 100 fl    MCH 29.0 26.5 - 33.0 pg    MCHC 32.7 31.5 - 36.5 g/dL    RDW 14.2 10.0 - 15.0 %    Platelet Count 256 150 - 450 10e9/L    Diff Method Automated Method     % Neutrophils 75.5 %    % Lymphocytes 12.8 %    % Monocytes 9.0 %    % Eosinophils 1.7 %    % Basophils 0.8 %    % Immature Granulocytes 0.2 %    Absolute Neutrophil 6.3 1.6 - 8.3 10e9/L    Absolute Lymphocytes 1.1 0.8 - 5.3 10e9/L    Absolute Monocytes 0.8 0.0 - 1.3 10e9/L    Absolute Eosinophils  0.1 0.0 - 0.7 10e9/L    Absolute Basophils 0.1 0.0 - 0.2 10e9/L    Abs Immature Granulocytes 0.0 0 - 0.4 10e9/L   Lactic acid   Result Value Ref Range    Lactic Acid 1.0 0.5 - 2.2 mmol/L   Troponin I   Result Value Ref Range    Troponin I ES <0.030 0.000 - 0.034 ug/L   Nt probnp inpatient (BNP)   Result Value Ref Range    N-Terminal Pro BNP Inpatient 55 0 - 100 pg/mL   XR Chest Port 1 View    Narrative    PROCEDURE:  XR CHEST PORT 1 VW    HISTORY:  syncopal episode; .     COMPARISON:  10/7/2018    FINDINGS:   The cardiac silhouette is normal in size. The pulmonary vasculature is  normal.  The lungs are clear. No pleural effusion or pneumothorax.      Impression    IMPRESSION:  No acute cardiopulmonary disease.      NEPTALI GORDON MD   *UA reflex to Microscopic   Result Value Ref Range    Color Urine Yellow     Appearance Urine Clear     Glucose Urine Negative NEG^Negative mg/dL    Bilirubin Urine Negative NEG^Negative    Ketones Urine Negative NEG^Negative mg/dL    Specific Gravity Urine 1.015 1.000 - 1.030    Blood Urine Negative NEG^Negative    pH Urine 7.0 5.0 - 9.0 pH    Protein Albumin Urine Negative NEG^Negative mg/dL    Urobilinogen Urine 0.2 0.2 - 1.0 EU/dL    Nitrite Urine Positive (A) NEG^Negative    Leukocyte Esterase Urine Small (A) NEG^Negative    Source Midstream Urine    Urine Microscopic   Result Value Ref Range    WBC Urine 5-10 (A) OTO5^0 - 5 /HPF    RBC Urine O - 2 OTO2^O - 2 /HPF    Bacteria Urine Many (A) NEG^Negative /HPF       Medications   sodium chloride 0.9% infusion ( Intravenous New Bag 11/5/18 1050)   ondansetron (ZOFRAN) injection 4 mg (4 mg Intravenous Given 11/5/18 1051)       Assessments & Plan (with Medical Decision Making)     I have reviewed the nursing notes.    I have reviewed the findings, diagnosis, plan and need for follow up with the patient.      New Prescriptions    AMOXICILLIN-CLAVULANATE (AUGMENTIN) 875-125 MG PER TABLET    Take 1 tablet by mouth 2 times daily for 10  days       Final diagnoses:   Acute cystitis without hematuria   Generalized muscle weakness   Syncope, unspecified syncope type     Afebrile.  Vital signs stable.  Apparent possible syncopal episode.  He had a recent zio patch evaluation as well as carotid artery evaluation.  Orthostatics vitals appear normal.  IV established and she was given fluids as well as some Zofran as needed.  EKG shows first-degree AV block with septal infarct, age undetermined.  Possible inferior infarct, age undetermined.  He does have T wave inversion in V1 which she did not have in her previous EKG on 10/7/2018.  Troponin is normal.  BNP is normal.  CBC is unremarkable.  Lactic acid is normal.  CMP shows carbon dioxide at 20 and a BUN at 26 but her GFR is normal.  Chest x-ray appears normal with no pleural effusion or pneumothorax.  No acute findings.  Her UA returns nitrite positive with many bacteria and 5-10 white blood cells.  UTI.  UC is pending.  She was evaluated over an extended time frame in the ER with cardiac telemetry with no signs of ectopy or pauses or any cardiac abnormalities.  She will return to [facility] at this time.  We will start her on Augmentin twice daily.  Follow-up with her primary care provider in 1 week for further evaluation.  Follow-up sooner if there is any other concerns problems or questions.  Getting ready to discharge the patient [facility] called and stated that they cannot get her Augmentin until tomorrow since [facility] pharmacy [facility].  It is currently 1:15 PM, [facility] until 1500.  However patient was given first oral dose of Augmentin in the ER.      11/5/2018   [facility] AND [facility]     Rian Moore PA-C  11/05/18 9614

## 2018-11-05 NOTE — ED TRIAGE NOTES
Patient brought by EMS for c/o SOB and heart problems.  Assisted living states patient had no pulse for a minute maybe.  Patient was alert and oriented when EMS arrived, no SOB, EKGdone no irregularities, no chest pain.  Was incontinent of urine and c/o abdominal pain comes and goes.

## 2018-11-05 NOTE — ED NOTES
Orthostatic Blood Pressures    1129am  Lying /64  Lying Pulse 57    1131am  Sitting /80  Sitting Pulse 60    1133am  Standing /104  Standing Pulse 67

## 2018-11-07 ENCOUNTER — TELEPHONE (OUTPATIENT)
Dept: EMERGENCY MEDICINE | Facility: OTHER | Age: 83
End: 2018-11-07

## 2018-11-07 ENCOUNTER — TELEPHONE (OUTPATIENT)
Dept: FAMILY MEDICINE | Facility: OTHER | Age: 83
End: 2018-11-07

## 2018-11-07 DIAGNOSIS — N30.00 ACUTE CYSTITIS WITHOUT HEMATURIA: Primary | ICD-10-CM

## 2018-11-07 DIAGNOSIS — R00.1 BRADYCARDIA: ICD-10-CM

## 2018-11-07 LAB
BACTERIA SPEC CULT: ABNORMAL
SPECIMEN SOURCE: ABNORMAL

## 2018-11-07 RX ORDER — NITROFURANTOIN 25; 75 MG/1; MG/1
100 CAPSULE ORAL 2 TIMES DAILY
Qty: 14 CAPSULE | Refills: 0 | Status: ON HOLD | OUTPATIENT
Start: 2018-11-07 | End: 2018-01-01

## 2018-11-07 NOTE — TELEPHONE ENCOUNTER
Roro from [facility] called this nurse directly for abnormal ZIO patch results. States that this is the number that she has to call. Did inform her to please not call my direct line (has happened in the past) and to please call [phone]. They will do this in the future.     Please call them back regarding abnormal ZIO patch results from 10/16/18. Doctor also needs to be notified. Ordering provider is Dr. Huerta.   Kristi Mills LPN...................11/7/2018   3:42 PM

## 2018-11-07 NOTE — TELEPHONE ENCOUNTER
[facility] Emergency Department Lab result notification [Adult-Female]    [place] ED lab result protocol used  Urine Culture    Reason for call  Notify of lab results, assess symptoms,  review ED providers recommendations/discharge instructions (if necessary) and advise per ED lab result f/u protocol    Lab Result (including Rx patient on, if applicable)  Final Urine Culture Report on 11/7/18  Emergency Dept discharge antibiotic prescribed: Amoxicillin-Clavulanate (Augmentin) 875-125 mg PO tablet, 1 tablet by mouth 2 times daily for 10 days  #1. Bacteria, >100,000 colonies/mL Citrobacter braakii,  is [RESISTANT] to antibiotic.   Change in treatment as per [place] ED Lab result protocol.    Information table from ED Provider visit on 11/5/18  Symptoms reported at ED visit (Chief complaint, HPI) This is a 89-year-old female who resides in [facility] living [facility].  EMS was called by staff there who reported that the patient had no pulse for approximately a minute.  Staff reported that the patient had been complaining of shortness of breath and heart problems.  When medics arrived the patient alert and oriented.  Patient was complaining only of some lower abdominal pain which waxed and waned.  Was noted that she was incontinent of urine.   Significant Medical hx, if applicable (i.e. CKD, diabetes) Recurrent UTI, sepsis   Allergies Allergies   Allergen Reactions     Atorvastatin Other (See Comments) and Nausea and Vomiting     Myalgia     Ciprofloxacin Other (See Comments)     Erythromycin      Lisinopril Cough     Simvastatin Other (See Comments)     Achey muscles.      Sulfamethoxazole W/Trimethoprim Unknown     Nausea and shaking      Weight, if applicable Wt Readings from Last 2 Encounters:   11/05/18 60.3 kg (133 lb)   10/12/18 60.3 kg (133 lb)      Coumadin/Warfarin [Yes /No] No   Creatinine Level (mg/dl) Creatinine   Date Value Ref Range Status   11/05/2018 0.69 0.60 - 1.20  mg/dL Final      Creatinine clearance (ml/min), if applicable CREATININE: 0.69 mg/dL (11/05/18 1055)  Estimated creatinine clearance: 47.5 mL/min   Pregnant (Yes/No/NA) No   Breastfeeding (Yes/No/NA) No   ED providers Impression and Plan (applicable information) Afebrile.  Vital signs stable.  Apparent possible syncopal episode.  He had a recent zio patch evaluation as well as carotid artery evaluation.  Orthostatics vitals appear normal.  IV established and she was given fluids as well as some Zofran as needed.  EKG shows first-degree AV block with septal infarct, age undetermined.  Possible inferior infarct, age undetermined.  He does have T wave inversion in V1 which she did not have in her previous EKG on 10/7/2018.  Troponin is normal.  BNP is normal.  CBC is unremarkable.  Lactic acid is normal.  CMP shows carbon dioxide at 20 and a BUN at 26 but her GFR is normal.  Chest x-ray appears normal with no pleural effusion or pneumothorax.  No acute findings.  Her UA returns nitrite positive with many bacteria and 5-10 white blood cells.  UTI.  UC is pending.  She was evaluated over an extended time frame in the ER with cardiac telemetry with no signs of ectopy or pauses or any cardiac abnormalities.  She will return to [facility] at this time.  We will start her on Augmentin twice daily.  Follow-up with her primary care provider in 1 week for further evaluation.  Follow-up sooner if there is any other concerns problems or questions.  Getting ready to discharge the patient [facility] called and stated that they cannot get her Augmentin until tomorrow since [facility] pharmacy [facility].  It is currently 1:15 PM, [facility] be open until 1500.  However patient was given first oral dose of Augmentin in the ER.   ED diagnosis   Acute cystitis without hematuria   ED provider Rian Moore PA-C      RN Assessment (Patient s current Symptoms), include time called.  [Insert Left message here if  message left]  Patient in [facility].      [facility] Provider Name & Recommendations (included time consulted)  Did review with Dr. Shields at 11:05 am, provider in ED whom treated patient.  Per Dr. Shields, will have treating provider address change in treatment.       PCP follow-up Questions asked: YES       Nemo Mckeon RN    [facility] RN  Lung Nodule and ED Lab Results F/U RN  Epic pool (ED late result f/u RN) : P [id number]   # [phone]    Copy of Lab result   Order   Urine Culture Aerobic Bacterial [[id number]] (Order [id number])   Exam Information   Exam Date Exam Time Accession # Results    11/5/18 12:10 PM [id number]    Component Results   Component Collected Lab   Specimen Description 11/05/2018 12:10 PM [facility]   Midstream Urine   Culture Micro (Abnormal) 11/05/2018 12:10 PM [facility]   >100,000 colonies/mL   Citrobacter braakii      Culture & Susceptibility   CITROBACTER BRAAKII   Antibiotic Interpretation Sensitivity Unit Method Status   AMPICILLIN Resistant >16 ug/mL BRICE Final   AZTREONAM Intermediate 16 ug/mL BRICE Final   CEFEPIME Sensitive <=8 ug/mL BRICE Final   CEFTAZIDIME Resistant >16 ug/mL BRICE Final   CEFTRIAXONE Intermediate 32 ug/mL BRICE Final   CEFUROXIME Resistant >16 ug/mL BRICE Final   CIPROFLOXACIN Sensitive <=1 ug/mL BRICE Final   GENTAMICIN Sensitive <=4 ug/mL BRICE Final   IMIPENEM Sensitive <=1 ug/mL BRICE Final   LEVOFLOXACIN Sensitive <=2 ug/mL BRICE Final   NITROFURANTOIN Sensitive <=32 ug/mL BRICE Final   TETRACYCLINE Sensitive <=4 ug/mL BRICE Final   Trimethoprim/Sulfa Sensitive <=2/38 ug/mL BRICE Final

## 2018-11-07 NOTE — ED NOTES
Called [facility] RN noting that the patient's UC showed a bacteria that was resistant to the Augmentin.  She has multiple allergies.  She will will be switched to doxycycline 100 mg p.o. twice daily times 10 days.  This will be phoned into her pharmacy for pickup by her nursing facility     Rian Moore PA-C  11/07/18 1111

## 2018-11-07 NOTE — TELEPHONE ENCOUNTER
Per MARLYN Pineda CNP who took call, pt had symptomatic bradycardia with no diary entry. Rate 35 bpm on 10/25/18 at 1027. Cardiology referral advised. Bekah Hodgson RN on 11/7/2018 at 4:41 PM

## 2018-11-11 LAB
BACTERIA SPEC CULT: NORMAL
BACTERIA SPEC CULT: NORMAL
SPECIMEN SOURCE: NORMAL
SPECIMEN SOURCE: NORMAL

## 2018-11-13 ENCOUNTER — TRANSFERRED RECORDS (OUTPATIENT)
Dept: HEALTH INFORMATION MANAGEMENT | Facility: OTHER | Age: 83
End: 2018-11-13

## 2018-11-13 DIAGNOSIS — M15.0 PRIMARY OSTEOARTHRITIS INVOLVING MULTIPLE JOINTS: ICD-10-CM

## 2018-11-15 NOTE — TELEPHONE ENCOUNTER
Redundant refill request refused: Too soon:    MAPAP 500 MG tablet 129 tablet 2 10/3/2018  Yes   Sig: TAKE 1 TABLET BY MOUTH FOUR TIMES DAILY AS NEEDED   Class: E-Prescribe   Order: [id number]   E-Prescribing Status: Receipt confirmed by pharmacy (10/3/2018  8:31 AM CDT)     [id number] - [place], [place] - [address]     Unable to complete prescription refill per RN Medication Refill Policy. Kelly Conner RN .............. 11/15/2018  10:30 AM

## 2018-11-19 ENCOUNTER — OFFICE VISIT (OUTPATIENT)
Dept: FAMILY MEDICINE | Facility: OTHER | Age: 83
End: 2018-11-19
Attending: FAMILY MEDICINE
Payer: MEDICARE

## 2018-11-19 VITALS
BODY MASS INDEX: 21.43 KG/M2 | HEART RATE: 64 BPM | DIASTOLIC BLOOD PRESSURE: 62 MMHG | WEIGHT: 132.8 LBS | SYSTOLIC BLOOD PRESSURE: 152 MMHG

## 2018-11-19 DIAGNOSIS — N39.0 RECURRENT UTI: Primary | ICD-10-CM

## 2018-11-19 PROCEDURE — G0463 HOSPITAL OUTPT CLINIC VISIT: HCPCS

## 2018-11-19 PROCEDURE — 99213 OFFICE O/P EST LOW 20 MIN: CPT | Performed by: FAMILY MEDICINE

## 2018-11-19 ASSESSMENT — PATIENT HEALTH QUESTIONNAIRE - PHQ9: SUM OF ALL RESPONSES TO PHQ QUESTIONS 1-9: 0

## 2018-11-19 ASSESSMENT — PAIN SCALES - GENERAL: PAINLEVEL: NO PAIN (0)

## 2018-11-19 NOTE — MR AVS SNAPSHOT
After Visit Summary   11/19/2018    Karey Paulson    MRN: [id number]           Patient Information     Date Of Birth          10/17/1924        Visit Information        Provider Department      11/19/2018 3:30 PM Byron Huerta MD [facility]        Today's Diagnoses     Recurrent UTI    -  1       Follow-ups after your visit        Your next 10 appointments already scheduled     Dec 04, 2018  3:15 PM CST   New Visit with Juwan Rao,    [facility]    [address]  [place] [id number] [phone]              Who to contact     If you have questions or need follow up information about today's clinic visit or your schedule please contact [facility] directly at [phone].  Normal or non-critical lab and imaging results will be communicated to you by MyChart, letter or phone within 4 business days after the clinic has received the results. If you do not hear from us within 7 days, please contact the clinic through MyChart or phone. If you have a critical or abnormal lab result, we will notify you by phone as soon as possible.  Submit refill requests through [employer] or call your pharmacy and they will forward the refill request to us. Please allow 3 business days for your refill to be completed.          Additional Information About Your Visit        Care EveryWhere ID     This is your Care EveryWhere ID. This could be used by other organizations to access your [facility] medical records  [id number]        Your Vitals Were     Pulse BMI (Body Mass Index)                64 21.43 kg/m2           Blood Pressure from Last 3 Encounters:   11/19/18 152/62   11/05/18 156/71   10/12/18 106/48    Weight from Last 3 Encounters:   11/19/18 132 lb 12.8 oz (60.2 kg)   11/05/18 133 lb (60.3 kg)   10/12/18 133 lb (60.3 kg)              Today, you had the following     No orders found for  display       Primary Care Provider Office Phone # Fax #    Byron Huerta [phone] [address]  [facility] [address]        Equal Access to Services     SOPHIAASHLY GAY : Maksim wilkins phoenix Flaherty, wazacariasda lucameron, qaybta kasanjuanada estela, charlie dennis genovevamedardo aggarwal laGianfrancoalysia pruitt. So [facility] [phone].    ATENCIÓN: Si habla español, tiene a garza disposición servicios gratuitos de asistencia lingüística. Llame al [phone].    We comply with applicable federal civil rights laws and [place] laws. We do not discriminate on the basis of race, color, national origin, age, disability, sex, sexual orientation, or gender identity.            Thank you!     Thank you for choosing [facility] AND hospitals  for your care. Our goal is always to provide you with excellent care. Hearing back from our patients is one way we can continue to improve our services. Please take a few minutes to complete the written survey that you may receive in the mail after your visit with us. Thank you!             Your Updated Medication List - Protect others around you: Learn how to safely use, store and throw away your medicines at www.disposemymeds.org.          This list is accurate as of 11/19/18 11:59 PM.  Always use your most recent med list.                   Brand Name Dispense Instructions for use Diagnosis    amLODIPine 5 MG tablet    NORVASC    90 tablet    Take 1 tablet (5 mg) by mouth daily    Essential (primary) hypertension       ASPIRIN LOW DOSE 81 MG EC tablet   Generic drug:  aspirin     90 tablet    TAKE 1 TABLET BY MOUTH EVERY EVENING    Hyperlipidemia, Essential hypertension, benign       brimonidine 0.1 % ophthalmic solution    ALPHAGAN P     1 drop        cholecalciferol 1000 UNIT tablet    vitamin D3     TAKE 1 TABLET BY MOUTH ONCE DAILY (IN THE MORNING)        COMBIGAN 0.2-0.5 % ophthalmic solution   Generic drug:  brimonidine-timolol      PLACE 1 DROP IN THE LEFT EYE  TWICE DAILY        latanoprost 0.005 % ophthalmic solution    XALATAN     PLACE 1 DROP IN THE RIGHT EYE NIGHTLY        losartan 50 MG tablet    COZAAR    90 tablet    Take 1 tablet (50 mg) by mouth daily    Essential hypertension       MAPAP 500 MG tablet   Generic drug:  acetaminophen     129 tablet    TAKE 1 TABLET BY MOUTH FOUR TIMES DAILY AS NEEDED    Primary osteoarthritis involving multiple joints       melatonin 3 MG tablet     100 tablet    TAKE 1 TABLET BY MOUTH NIGHTLY AT BEDTIME    Primary insomnia       nitroFURantoin (macrocrystal-monohydrate) 100 MG capsule    MACROBID    14 capsule    Take 1 capsule (100 mg) by mouth 2 times daily    Acute cystitis without hematuria       order for DME     1 each    Equipment being ordered: Wheelchair manual    Weakness of both lower extremities, Chronic bilateral low back pain without sciatica       prednisoLONE acetate 1 % ophthalmic susp    PRED FORTE     PLACE 1 DROP IN THE LEFT EYE FOUR TIMES DAILY        ranitidine 150 MG tablet    ZANTAC     Take 1 tablet (150 mg) by mouth 2 times daily        SIMBRINZA 1-0.2 % ophthalmic suspension   Generic drug:  brinzolamide-brimonidine      PLACE 1 DROP IN THE RIGHT EYE THREE TIMES DAILY        timolol 0.5 % ophthalmic solution    TIMOPTIC     PLACE 1 DROP IN THE RIGHT EYE TWICE DAILY

## 2018-11-21 NOTE — TELEPHONE ENCOUNTER
Fax states they want to have refills in place when she is due to fill her medication.  Kiana Steel CMA ([facility])................ 11/21/2018 12:25 PM

## 2018-11-21 NOTE — PROGRESS NOTES
SUBJECTIVE:   Karey Paulson is a 89 year old female who presents to clinic today for the following health issues: Follow-up cystitis    HPI Comments: Patient arrives here for follow-up cystitis.  She was recently seen in the ER for a UTI.  She states that she is doing a little better.  Continues to have some cramping.  Activity is decreased.  Feels worn out easily.        Patient Active Problem List    Diagnosis Date Noted     Bradycardia 11/08/2018     Priority: Medium     Syncope, unspecified syncope type 10/12/2018     Priority: Medium     Primary insomnia 04/11/2018     Priority: Medium     Weakness of both lower extremities 03/27/2018     Priority: Medium     Essential (primary) hypertension 03/13/2018     Priority: Medium     S/P laparoscopic colectomy 11/14/2017     Priority: Medium     Diverticulosis of large intestine 10/22/2017     Priority: Medium     Overview:   S/p fle sig, 10/22/2017       Stricture of sigmoid colon (H) 10/22/2017     Priority: Medium     Overview:   S/p flex sig 10/22/2017       [place]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     Priority: Medium     Overview:        Recurrent UTI 10/21/2017     Priority: Medium     Overview:   2 in 2017, 10/13/2017 and 6/28/2017       Pancreatic cyst 10/20/2017     Priority: Medium     Overview:   Cystic nodules, multiple; s/p CT Abd Pelv       Gastroesophageal reflux disease without esophagitis 12/31/2015     Priority: Medium     ACP (advance care planning) 12/05/2013     Priority: Medium     Backache 02/24/2013     Priority: Medium     Actinic keratosis 04/18/2012     Priority: Medium     Osteoarthrosis 11/08/2011     Priority: Medium     Hyperlipidemia 01/29/2009     Priority: Medium     Overview:   Overview:   IMO Update 10/11       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     Priority: Medium     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     11/14/2017     Acute myocardial infarction  (H)     2010     Cyst of pancreas     10/20/2017,Cystic nodules, multiple; s/p CT Abd Pelv     Diaphragmatic hernia without obstruction or gangrene     10/20/2017,s/p CT Abd/Pelv     Diverticulosis of large intestine without perforation or abscess without bleeding     10/22/2017,S/p fle sig, 10/22/2017     Edema     2011     Epigastric pain     2015     Essential (primary) hypertension     No Comments Provided     Gastro-esophageal reflux disease without esophagitis     2015     Osteoarthritis     2011     Other intestinal obstruction unspecified as to partial versus complete obstruction (CODE)     10/22/2017,S/p flex sig 10/22/2017     Personal history of malignant neoplasm of breast     Questionable 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     Personal history of other medical treatment (CODE)          Postmenopausal atrophic vaginitis     2012     Sepsis (H)     2017,E coli bacteremia; E coli UTI (resistant to Cipro)     Urinary tract infection     2017,Resistant to cipro     Urinary tract infection     10/21/2017,2 in , 10/13/2017 and 2017      Past Surgical History:   Procedure Laterality Date     ANGIOPLASTY      2010,Angioplasty with 3 bare-metal stents RCA     APPENDECTOMY OPEN      ,Appendectomy     CHOLECYSTECTOMY      ,[place]     EXTRACAPSULAR CATARACT EXTRATION WITH INTRAOCULAR LENS IMPLANT      2016,bilat     HYSTERECTOMY TOTAL ABDOMINAL      ,RUEL, ovaries remaining     MASTECTOMY SIMPLE      ,[place]     OTHER SURGICAL HISTORY      10/24/2017,124398,OTHER,Ellen Arambula and Jairo       Review of Systems     OBJECTIVE:     /62 (BP Location: Left arm, Patient Position: Sitting)  Pulse 64  Wt 132 lb 12.8 oz (60.2 kg)  BMI 21.43 kg/m2  Body mass index is 21.43 kg/(m^2).  Physical Exam   Constitutional: She appears well-developed.   In a wheelchair   Pulmonary/Chest: Effort normal.   Abdominal: Soft. She exhibits no distension and no mass.  There is no tenderness. There is no rebound and no guarding.       none     ASSESSMENT/PLAN:         1. Recurrent UTI  Doing better but slow growing.  Continue to monitor.  No change in medical recommendations at this time.        Byron Huerta MD  [facility]

## 2018-11-23 NOTE — TELEPHONE ENCOUNTER
[facility] #[id number] sent Rx request for the following:     MAPAP 500 MG tablet  TAKE 1 TABLET BY MOUTH FOUR TIMES DAILY AS NEEDED  Last Written Prescription Date:  10/3/18  Last Fill Quantity: 129,   # refills: 2    Last Office Visit: 11/19/18  Future Office visit: None.    Prescription approved per [facility] Refill Protocol for #129, R-11, at this time. Kelly Conner RN .............. 11/23/2018  3:09 PM

## 2018-11-29 NOTE — TELEPHONE ENCOUNTER
[facility] #[id number] sent Rx request for the following:     ranitidine (ZANTAC) 150 MG tablet  Take 1 tablet (150 mg) by mouth 2 times daily  Last Written Prescription Date:  1/19/18  Last Fill Quantity: 120,   # refills: 5 (at [facility])    Last Office Visit: 11/19/18  Future Office visit: None.    Prescription approved per [facility] [place] for 90 days and 3 refills at this time. Kelly Conner RN .............. 11/29/2018  8:04 AM

## 2018-12-04 PROBLEM — M62.81 GENERALIZED MUSCLE WEAKNESS: Status: ACTIVE | Noted: 2018-01-01

## 2018-12-04 PROBLEM — I44.0 FIRST DEGREE HEART BLOCK: Status: ACTIVE | Noted: 2018-01-01

## 2018-12-04 NOTE — PROGRESS NOTES
[facility] HEART CARE   CARDIOLOGY CONSULT     Karey Paulson   10/17/1924  [id number]    Byron Huerta     Chief Complaint   Patient presents with     Consult For     bradycardia          HPI:   Mrs. Paulson is a 89-year-old female who is being seen by cardiology secondary with heart rates as low as 32 bpm, pauses up to 3.6 seconds, and concern for a near syncope episode.  She has a history of myocardial infarction on 12/14/2010, mild lower extremity edema, uncontrolled hypertension, GERD, cardiac catheterization on 12/14/2010 with a stent placement x3 to the RCA, first-degree heart block, and generalized weakness.    She was seen in the ER on 10/7/18.  She is a patient of [facility].  She felt flushed, warm, sweaty, nauseated, and was described as passing on the ER.  She was asked multiple times today if she is ever passed out which she has denied.  When asked about this incident, she says she did not completely pass out.  She is somewhat of a poor historian.  The ER report describes her being incontinent of stool.  She was felt to be out for approximately 20 seconds.  Her heart rate was felt to be in the 40's.  Based on the history, it seems suggestive of vasovagal syncope.  Previous to this timeframe, she had no history of syncope or cardiac issues.  Today, she denies any palpitations, fluttering her chest, or racing heart.  She has not any chest pain, chest tightness, or chest discomfort.  She denies much for lower extremity edema.  She feels that she is not cared for very well at her place of living.  She has been there for multiple years and is considering at looking at a different place.  It does not sound like she is neglected but there is low activity at this nursing home.  She was monitored in the ER but there was no ectopy and no symptoms.  Cardiogenic cause of syncope were also considered.    She was seen by her primary in follow-up in 10/12/18.  She had been sitting when she had a loss of  consciousness.  She was taken off atenolol.  Her heart rate and blood pressure was noted to be low.  Norvasc was decreased from 10 mg daily down to 5 mg daily.  She was set up for a Zio patch.     She had a Zio patch completed on 10/16/18.  Her heart rate was as low as 32 bpm on 9:13 AM.  Cardiology was contacted on 11/7/18 but since she had not been seen by cardiology, she was referred to the ordering provider. They were concerned for a bradycardic episode at 35 bpm while awake on 10/25/18 at 10:27 AM.  She was also noted to have pauses up to 3.6 seconds.    We discussed the findings of her Zio patch today.  She was told that recommendations would be a IIa for a pacemaker placement.  Based on her episode of syncope, pauses, and bradycardia, she should be considered for pacemaker.  This is something she is willing to have done.  It was suggested she have this done here locally and she was agreeable.    Her blood pressure is elevated today at 160/80 with a heart rate of 60.  She is currently on Norvasc 5 mg daily and losartan 50 mg daily, she does have some swelling to her lower extremities and as a result losartan was increased from 50 mg to 100 mg daily.          IMAGING RESULTS:   US CAROTID BILATERAL     HISTORY:  ; Syncope, unspecified syncope type.     TECHNIQUE: Grayscale, color Doppler and spectral Doppler assessment of  the major cervical arteries was performed.      COMPARISON: None.     FINDINGS:     The caliber of the common carotid arteries is preserved. No elevation  of systolic velocities within the common carotids is seen.     There is moderate mixed atherosclerotic plaque at the carotid  bifurcations, worse on the right.     Peak systolic velocity within the proximal internal carotid arteries  measures up to 131 cm/s on the right and 103 cm/s on the left. No  downstream tardus parvus waveforms are identified.     The external carotid arteries are patent. The vertebral arteries are  antegrade.     A  left thyroid nodular goiter with substernal extension is partially  visualized.         IMPRESSION:      Atherosclerotic disease of the carotid bifurcations contributes to up  to 50% stenosis of the proximal right internal carotid artery.    [facility]  [address]  [place] [address]  10/16/2018      Patient:  Karey Paulson  Chart: [id number]  :  10/17/1924  Age:  89 year old  Sex:  female       Procedure:  ZioPatch Monitor.  Technician performing hook-up:  Brianna Hassan    Indication: 89-year-old female with syncope and collapse.  Analysis Time: 13 days, 18 hours starting 10/16 until 10/30/2018.\  Minimum heart rate 32, average 63, maximum 133 bpm.  Minimum heart rate occurred at 9:13 AM on 10/28.  Predominant underlying rhythm was sinus.  First-degree AV block was present.  3 supraventricular tachycardia runs occurred.  The run at the fastest interval lasted 17 beats with maximum rate of 133 bpm.  One pause occurred, lasting 3.6 seconds.  Supraventricular ectopy was rare.  Ventricular ectopy was rare.  No patient triggered events.  5 patient diary entries.  Findings showed sinus rhythm, supraventricular ectopic beats.     Impression:   Predominant underlying rhythm was sinus rhythm.  Symptomatic bradycardia with a 3.6-second pause was noted.    3 episodes of supraventricular tachycardia.    ALLERGIES:   Allergies   Allergen Reactions     Atorvastatin Other (See Comments) and Nausea and Vomiting     Myalgia     Ciprofloxacin Other (See Comments)     Erythromycin      Lisinopril Cough     Simvastatin Other (See Comments)     Achey muscles.      Sulfamethoxazole W/Trimethoprim Unknown     Nausea and shaking        PAST MEDICAL HISTORY:   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     2017     Acute myocardial infarction (H)     2010     Cyst of pancreas     10/20/2017,Cystic nodules, multiple; s/p CT  Abd Pelv     Diaphragmatic hernia without obstruction or gangrene     10/20/2017,s/p CT Abd/Pelv     Diverticulosis of large intestine without perforation or abscess without bleeding     10/22/2017,S/p fle sig, 10/22/2017     Edema     2011     Epigastric pain     2015     Essential (primary) hypertension     No Comments Provided     Gastro-esophageal reflux disease without esophagitis     2015     Osteoarthritis     2011     Other intestinal obstruction unspecified as to partial versus complete obstruction (CODE)     10/22/2017,S/p flex sig 10/22/2017     Personal history of malignant neoplasm of breast     Questionable 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     Personal history of other medical treatment (CODE)          Postmenopausal atrophic vaginitis     2012     Sepsis (H)     2017,E coli bacteremia; E coli UTI (resistant to Cipro)     Urinary tract infection     2017,Resistant to cipro     Urinary tract infection     10/21/2017,2 in , 10/13/2017 and 2017        PAST SURGICAL HISTORY:   Past Surgical History:   Procedure Laterality Date     ANGIOPLASTY      2010,Angioplasty with 3 bare-metal stents RCA     APPENDECTOMY OPEN      ,Appendectomy     CHOLECYSTECTOMY      ,[place]     EXTRACAPSULAR CATARACT EXTRATION WITH INTRAOCULAR LENS IMPLANT      2016,bilat     HYSTERECTOMY TOTAL ABDOMINAL      ,[facility], ovaries remaining     MASTECTOMY SIMPLE      ,[place]     OTHER SURGICAL HISTORY      10/24/2017,[id number],OTHER,Ellen Arambula and Jairo        FAMILY HISTORY:   Family History   Problem Relation Age of Onset     Breast Cancer No family hx of      Cancer-breast        SOCIAL HISTORY:   Social History     Social History     Marital status:      Spouse name: N/A     Number of children: N/A     Years of education: N/A     Social History Main Topics     Smoking status: Never Smoker     Smokeless tobacco: Never Used     Alcohol use No     Drug use: No       Comment: Drug use: No     Sexual activity: Not Asked     Other Topics Concern     None     Social History Narrative    ; living @  [facility]-moved to [facility] after surgery 10/2017.  1 son passed away at 66.         CURRENT MEDICATIONS:   Prior to Admission medications    Medication Sig Start Date End Date Taking? Authorizing Provider   acetaZOLAMIDE (DIAMOX) 250 MG tablet Take 250 mg by mouth 2 times daily 11/26/18  Yes Reported, Patient   amLODIPine (NORVASC) 5 MG tablet Take 1 tablet (5 mg) by mouth daily 10/30/18  Yes Byron Huerta MD   ASPIRIN LOW DOSE 81 MG EC tablet TAKE 1 TABLET BY MOUTH EVERY EVENING 10/3/18  Yes Byron Huerta MD   brimonidine (ALPHAGAN P) 0.1 % ophthalmic solution 1 drop 12/10/15  Yes Reported, Patient   COMBIGAN 0.2-0.5 % ophthalmic solution PLACE 1 DROP IN THE LEFT EYE TWICE DAILY 3/1/18  Yes Reported, Patient   latanoprost (XALATAN) 0.005 % ophthalmic solution PLACE 1 DROP IN THE RIGHT EYE NIGHTLY 1/18/18  Yes Reported, Patient   losartan (COZAAR) 50 MG tablet Take 1 tablet (50 mg) by mouth daily 7/27/18  Yes Denisse Burleson MD   MAPAP 500 MG tablet TAKE 1 TABLET BY MOUTH FOUR TIMES DAILY AS NEEDED 11/23/18  Yes Byron Huerta MD   melatonin 3 MG tablet TAKE 1 TABLET BY MOUTH NIGHTLY AT BEDTIME 10/3/18  Yes Byron Huerta MD   nitroFURantoin, macrocrystal-monohydrate, (MACROBID) 100 MG capsule Take 1 capsule (100 mg) by mouth 2 times daily 11/7/18  Yes Byron Huerta MD   order for DME Equipment being ordered: Wheelchair manual 6/11/18  Yes Byron Huerta MD   prednisoLONE acetate (PRED FORTE) 1 % ophthalmic susp PLACE 1 DROP IN THE LEFT EYE FOUR TIMES DAILY 4/9/18  Yes Reported, Patient   ranitidine (ZANTAC) 150 MG tablet Take 1 tablet (150 mg) by mouth 2 times daily 11/29/18  Yes Byron Huerta MD   SIMBRINZA 1-0.2 % ophthalmic suspension PLACE 1 DROP IN THE RIGHT EYE THREE TIMES DAILY 4/9/18  Yes Reported, Patient    timolol (TIMOPTIC) 0.5 % ophthalmic solution PLACE 1 DROP IN THE RIGHT EYE TWICE DAILY 6/1/18  Yes Reported, Patient   VITAMIN D3 1000 UNITS tablet TAKE 1 TABLET BY MOUTH ONCE DAILY (IN THE MORNING) 12/6/17  Yes Reported, Patient          ROS:   CONSTITUTIONAL: No weight loss, fever, chills, but admits to generalized weakness and fatigue.   HEENT: Eyes: No visual changes. Ears, Nose, Throat: No hearing loss, congestion or difficulty swallowing.   CARDIOVASCULAR: No chest pain, chest pressure or chest discomfort. No palpitations or lower extremity edema.   RESPIRATORY: No shortness of breath, dyspnea upon exertion, cough or sputum production.   GASTROINTESTINAL: No abdominal pain. No anorexia, nausea, vomiting or diarrhea.   NEUROLOGICAL: No headache, lightheadedness, dizziness, but she does have a history of syncope.  HEMATOLOGIC: No anemia, bleeding or bruising.   PSYCHIATRIC: No history of depression or anxiety.   ENDOCRINOLOGIC: No reports of sweating, cold or heat intolerance. No polyuria or polydipsia.   SKIN: No abnormal rashes or itching.       PHYSICAL EXAM:   GENERAL: The patient is a well-developed, well-nourished, in no apparent distress.  She was seen sitting comfortably in a wheelchair in the room.  She appears younger than stated age.  HEENT: Head is normocephalic and atraumatic. Eyes are symmetrical with normal visual tracking.  HEART: Regular rate and rhythm, S1S2 present without murmur, rub or gallop.   LUNGS: Respirations regular and unlabored. Clear to auscultation.   GI: Abdomen is soft and nondistended.   EXTREMITIES: Scant peripheral edema present.   MUSCULOSKELETAL: No joint swelling.   NEUROLOGIC: Alert and oriented X3.    SKIN: No jaundice. No rashes or visible skin lesions present.       LAB RESULTS:   No visits with results within 2 Month(s) from this visit.  Latest known visit with results is:    Office Visit on 03/13/2018   Component Date Value Ref Range Status     Color Urine  03/13/2018 Yellow   Final     Appearance Urine 03/13/2018 Cloudy   Final     Glucose Urine 03/13/2018 Negative  NEG^Negative mg/dL Final     Bilirubin Urine 03/13/2018 Negative  NEG^Negative Final     Ketones Urine 03/13/2018 Negative  NEG^Negative mg/dL Final     Specific Gravity Urine 03/13/2018 1.010  1.003 - 1.035 Final     Blood Urine 03/13/2018 Negative  NEG^Negative Final     pH Urine 03/13/2018 7.0  5.0 - 7.0 pH Final     Protein Albumin Urine 03/13/2018 Negative  NEG^Negative mg/dL Final     Urobilinogen Urine 03/13/2018 1.0  0.2 - 1.0 EU/dL Final     Nitrite Urine 03/13/2018 Positive* NEG^Negative Final     Leukocyte Esterase Urine 03/13/2018 Trace* NEG^Negative Final     Source 03/13/2018 Unspecified Urine   Final     Sodium 03/13/2018 137  134 - 144 mmol/L Final     Potassium 03/13/2018 3.5  3.5 - 5.1 mmol/L Final     Chloride 03/13/2018 108* 98 - 107 mmol/L Final     Carbon Dioxide 03/13/2018 24  21 - 31 mmol/L Final     Anion Gap 03/13/2018 5  3 - 14 mmol/L Final     Glucose 03/13/2018 102  70 - 105 mg/dL Final     Urea Nitrogen 03/13/2018 24  7 - 25 mg/dL Final     Creatinine 03/13/2018 0.63  0.60 - 1.20 mg/dL Final     GFR Estimate 03/13/2018 88  >60 mL/min/1.7m2 Final     GFR Estimate If Black 03/13/2018 >90  >60 mL/min/1.7m2 Final     Calcium 03/13/2018 9.8  8.6 - 10.3 mg/dL Final     WBC 03/13/2018 8.4  4.0 - 11.0 10e9/L Final     RBC Count 03/13/2018 4.49  3.8 - 5.2 10e12/L Final     Hemoglobin 03/13/2018 12.4  11.7 - 15.7 g/dL Final     Hematocrit 03/13/2018 38.7  35.0 - 47.0 % Final     MCV 03/13/2018 86  78 - 100 fl Final     MCH 03/13/2018 27.6  26.5 - 33.0 pg Final     MCHC 03/13/2018 32.0  31.5 - 36.5 g/dL Final     RDW 03/13/2018 15.9* 10.0 - 15.0 % Final     Platelet Count 03/13/2018 266  150 - 450 10e9/L Final     Diff Method 03/13/2018 Automated Method   Final     % Neutrophils 03/13/2018 74.2  % Final     % Lymphocytes 03/13/2018 15.4  % Final     % Monocytes 03/13/2018 7.1  % Final      % Eosinophils 03/13/2018 2.0  % Final     % Basophils 03/13/2018 0.9  % Final     % Immature Granulocytes 03/13/2018 0.4  % Final     Absolute Neutrophil 03/13/2018 6.3  1.6 - 8.3 10e9/L Final     Absolute Lymphocytes 03/13/2018 1.3  0.8 - 5.3 10e9/L Final     Absolute Monocytes 03/13/2018 0.6  0.0 - 1.3 10e9/L Final     Absolute Eosinophils 03/13/2018 0.2  0.0 - 0.7 10e9/L Final     Absolute Basophils 03/13/2018 0.1  0.0 - 0.2 10e9/L Final     Abs Immature Granulocytes 03/13/2018 0.0  0 - 0.4 10e9/L Final     Specimen Description 03/13/2018 Unspecified Urine   Final     Culture Micro 03/13/2018 *  Final                    Value:>100,000 colonies/mL  Citrobacter braakii       WBC Urine 03/13/2018 0 - 5  OTO5^0 - 5 /HPF Final     RBC Urine 03/13/2018 O - 2  OTO2^O - 2 /HPF Final     Bacteria Urine 03/13/2018 Many* NEG^Negative /HPF Final            ASSESSMENT:       ICD-10-CM    1. Bradycardia R00.1 EKG 12-lead, tracing only (Same Day)     CARDIOVASCULAR SURGERY REFERRAL   2. Syncope, unspecified syncope type R55 CARDIOVASCULAR SURGERY REFERRAL   3. Sinus pause I45.5 CARDIOVASCULAR SURGERY REFERRAL   4. Essential hypertension I10 losartan (COZAAR) 100 MG tablet   5. History of myocardial infarction 12/14/10 I25.2    6. GERD K21.9    7. Mixed hyperlipidemia E78.2    8. Hx of cardiac cath 12/14/10 with x3 stents to the RCA Z98.890    9. First degree heart block I44.0    10. Generalized muscle weakness M62.81          PLAN:   1.  Francheska and I had a long discussion about her Zio patch results from 10/6/18.  Her heart rate was as low as 32 bpm on 9:13 AM.  She did have a pause of 3.6 seconds.  She was seen in the ER on 10/7/18 for near syncope. The history she provides to me today is limited.  It would appear that she has a class IIa recommendation for pacemaker.  We discussed the risks and benefits of a pacemaker.  Her symptoms have not completely corresponded to her bradycardia, pauses, and near syncope.  She made  the decision to go ahead with a pacemaker placement.  Certainly, this can be done here locally.  A referral has been made for general surgery placement of the pacemaker.  2.  Her blood pressure remains elevated today at 160/80. She is currently on Norvasc 5 mg daily and losartan 50 mg daily.  We will plan to increase the losartan to 100 mg daily and leave the Norvasc at 5 mg daily.  3.  With history of myocardial infarction, she has not any chest pain, chest tightness, or chest discomfort.  4.  She will be seen in approximately 1-2 months follow-up upon completion of her pacemaker placement.      Thank you for allowing me to participate in the care of your patient. Please do not hesitate to contact me if you have any questions.     Juwan Rao, DO

## 2018-12-04 NOTE — MR AVS SNAPSHOT
After Visit Summary   12/4/2018    Karey Paulson    MRN: [id number]           Patient Information     Date Of Birth          10/17/1924        Visit Information        Provider Department      12/4/2018 3:15 PM Juwan Rao DO [facility]        Today's Diagnoses     Bradycardia    -  1    Syncope, unspecified syncope type        Sinus pause          Care Instructions    You were seen by  Juwan Rao DO      1. Increase Losartan from 50 mg to 100 mg daily     2. Pacemaker, they will call you to schedule this    You will follow up with  Juwan Rao DO after pacemaker.       Please call the cardiology office with problems, questions, or concerns at [phone].    If you experience chest pain, chest pressure, chest tightness, shortness of breath, fainting, lightheadedness, nausea, vomiting, or other concerning symptoms, please report to the Emergency Department or call 911. These symptoms may be emergent, and best treated in the Emergency Department.     DEEPIKA Amaya, RN  [facility] Cardiology  [phone]             Follow-ups after your visit        Who to contact     If you have questions or need follow up information about today's clinic visit or your schedule please contact [facility] directly at [phone].  Normal or non-critical lab and imaging results will be communicated to you by MyChart, letter or phone within 4 business days after the clinic has received the results. If you do not hear from us within 7 days, please contact the clinic through MyChart or phone. If you have a critical or abnormal lab result, we will notify you by phone as soon as possible.  Submit refill requests through [facility] or call your pharmacy and they will forward the refill request to us. Please allow 3 business days for your refill to be completed.          Additional Information About Your Visit        Care EveryWhere ID     This is your Care  "EveryWhere ID. This could be used by other organizations to access your [facility] medical records  [id number]        Your Vitals Were     Pulse Height BMI (Body Mass Index)             60 1.651 m (5' 5\") 23.13 kg/m2          Blood Pressure from Last 3 Encounters:   12/04/18 160/80   11/19/18 152/62   11/05/18 156/71    Weight from Last 3 Encounters:   12/04/18 63 kg (139 lb)   11/19/18 60.2 kg (132 lb 12.8 oz)   11/05/18 60.3 kg (133 lb)              Today, you had the following     No orders found for display       Primary Care Provider Office Phone # Fax #    Byron Huerta [phone]       [address] [facility] [address]        Equal Access to Services     [facility] Center[facility] : Hadii elizabeth coelloo Solizbeth, waaxda luqadaha, qaybta kaalmada estela, charlie oakley . So [facility] [phone].    ATENCIÓN: Si habla español, tiene a garza disposición servicios gratuitos de asistencia lingüística. Dwain al [phone].    We comply with applicable federal civil rights laws and [place] laws. We do not discriminate on the basis of race, color, national origin, age, disability, sex, sexual orientation, or gender identity.            Thank you!     Thank you for choosing [facility] AND [facility]  for your care. Our goal is always to provide you with excellent care. Hearing back from our patients is one way we can continue to improve our services. Please take a few minutes to complete the written survey that you may receive in the mail after your visit with us. Thank you!             Your Updated Medication List - Protect others around you: Learn how to safely use, store and throw away your medicines at www.disposemymeds.org.          This list is accurate as of 12/4/18  4:05 PM.  Always use your most recent med list.                   Brand Name Dispense Instructions for use Diagnosis    acetaZOLAMIDE 250 MG tablet    DIAMOX     Take 250 mg by mouth 2 times daily "        amLODIPine 5 MG tablet    NORVASC    90 tablet    Take 1 tablet (5 mg) by mouth daily    Essential (primary) hypertension       ASPIRIN LOW DOSE 81 MG EC tablet   Generic drug:  aspirin     90 tablet    TAKE 1 TABLET BY MOUTH EVERY EVENING    Hyperlipidemia, Essential hypertension, benign       brimonidine 0.1 % ophthalmic solution    ALPHAGAN P     1 drop        COMBIGAN 0.2-0.5 % ophthalmic solution   Generic drug:  brimonidine-timolol      PLACE 1 DROP IN THE LEFT EYE TWICE DAILY        latanoprost 0.005 % ophthalmic solution    XALATAN     PLACE 1 DROP IN THE RIGHT EYE NIGHTLY        losartan 50 MG tablet    COZAAR    90 tablet    Take 1 tablet (50 mg) by mouth daily    Essential hypertension       MAPAP 500 MG tablet   Generic drug:  acetaminophen     129 tablet    TAKE 1 TABLET BY MOUTH FOUR TIMES DAILY AS NEEDED    Primary osteoarthritis involving multiple joints       melatonin 3 MG tablet     100 tablet    TAKE 1 TABLET BY MOUTH NIGHTLY AT BEDTIME    Primary insomnia       nitroFURantoin macrocrystal-monohydrate 100 MG capsule    MACROBID    14 capsule    Take 1 capsule (100 mg) by mouth 2 times daily    Acute cystitis without hematuria       order for DME     1 each    Equipment being ordered: Wheelchair manual    Weakness of both lower extremities, Chronic bilateral low back pain without sciatica       prednisoLONE acetate 1 % ophthalmic suspension    PRED FORTE     PLACE 1 DROP IN THE LEFT EYE FOUR TIMES DAILY        ranitidine 150 MG tablet    ZANTAC    180 tablet    Take 1 tablet (150 mg) by mouth 2 times daily    Gastroesophageal reflux disease without esophagitis       SIMBRINZA 1-0.2 % ophthalmic suspension   Generic drug:  brinzolamide-brimonidine      PLACE 1 DROP IN THE RIGHT EYE THREE TIMES DAILY        timolol maleate 0.5 % ophthalmic solution    TIMOPTIC     PLACE 1 DROP IN THE RIGHT EYE TWICE DAILY        vitamin D3 1000 units (25 mcg) tablet    CHOLECALCIFEROL     TAKE 1 TABLET BY  MOUTH ONCE DAILY (IN THE MORNING)

## 2018-12-04 NOTE — NURSING NOTE
"Patient comes is for consult on bradycardia.  Alana Head LPN ....................12/4/2018   3:19 PM  Chief Complaint   Patient presents with     Consult For     bradycardia       Initial /80 (BP Location: Right arm, Patient Position: Sitting, Cuff Size: Adult Regular)  Pulse 60  Ht 1.651 m (5' 5\")  Wt 63 kg (139 lb)  BMI 23.13 kg/m2 Estimated body mass index is 23.13 kg/(m^2) as calculated from the following:    Height as of this encounter: 1.651 m (5' 5\").    Weight as of this encounter: 63 kg (139 lb).  Meds Reconciled: complete  Pt is on Aspirin  Pt is not on a Statin  PHQ and/or ANTHONY reviewed. Pt referred to PCP/MH Provider as appropriate.    Alana Head LPN      "

## 2018-12-04 NOTE — PATIENT INSTRUCTIONS
You were seen by  Juwan Rao DO      1. Increase Losartan from 50 mg to 100 mg daily     2. Pacemaker, they will call you to schedule this    You will follow up with  Juwan Rao DO after pacemaker.       Please call the cardiology office with problems, questions, or concerns at [phone].    If you experience chest pain, chest pressure, chest tightness, shortness of breath, fainting, lightheadedness, nausea, vomiting, or other concerning symptoms, please report to the Emergency Department or call 911. These symptoms may be emergent, and best treated in the Emergency Department.     DUANE AmayaN, RN  [facility] Cardiology  [phone]

## 2018-12-13 PROBLEM — T17.908A ASPIRATION INTO AIRWAY: Status: ACTIVE | Noted: 2018-01-01

## 2018-12-13 PROBLEM — J69.0 ASPIRATION PNEUMONIA (H): Status: ACTIVE | Noted: 2018-01-01

## 2018-12-13 NOTE — PLAN OF CARE
Patient arrived to the unit at 1643.  Patient is on high flow o2 at 7lpm.  98.2, 96/83, 32, 95%.  Patient is incontinent of bowel and bladder.  Patient is not voicing complaints but speech is incoherent at this time.  Assessment is completed and documented.  Patient does have some skin breakdown at the rectum and folds of buttock cheeks.  Barrier cream is placed on patient.  Patient does have some vaginal discharge that is white.  Patient is at times pulling at lines and difficult to re-direct.  [facility]

## 2018-12-13 NOTE — ED NOTES
Increased confusion, pt is restless, pulling at clothing, cardiac monitor & IV lines, trying to sit up, says she's hot and then cold, requires constant supervision and redirection.  Pt awaiting admit to ICU.

## 2018-12-13 NOTE — ED NOTES
Pt has chest rattling/audible wheezing, is restless, reports feeling SOB.  Pt also incontinent of diarrhea, pt cleaned up, barrier cream applied.  Pt placed on cardiac monitor, seems more confused, 02 is sating in low 80's, started on 4L with no improvement, increased to 6L.  PA notified & came to bedside, new orders received.

## 2018-12-13 NOTE — H&P
[facility]    History and Physical  Hospitalist       Date of Admission:  12/13/2018    Assessment & Plan   Karey Paulson is a 89 year old female who presents with intractable vomiting and diarrhea, with subsequent aspiration pneumonia of vomitus.    Principal Problem:    Aspiration pneumonia (H)    Assessment: Present on admission.  Currently oxygenating well but on 7 L of nasal cannula oxygen.  The patient presented with severe gastroenteritis and near obtundation.  Her prognosis is poor.  I communicated this to her family and confirmed her CODE STATUS as DNR/DNI.    Plan: Manage in ICU with IV Zosyn.  Supportive care.    Severe gastroenteritis  Assessment: Suspect viral source, as there are a number of people in her community with similar symptoms.  Plan: IV fluids, antiemetics, electrolyte management.    Active Problems:    GERD    Assessment:chronic      CAD (coronary artery disease)    Assessment: chronic    DVT Prophylaxis: Pneumatic Compression Devices  Code Status: DNR/DNI    Henrik Fall    Primary Care Physician   Byron Huerta    Chief Complaint   Unresponsive, covered in vomitus and stool    History is obtained from the patient and chart review.    History of Present Illness   Karey Paulson is a 89 year old female who presents with intractable vomiting and diarrhea.  She resides at [facility].  She presented by EMS after having a syncopal spell x2 and then developing nausea vomiting and diarrhea.  Upon arrival she was covered in vomitus and stool and continued to have episodes of both in the emergency department.  She was initially awake and alert, but over the course of her evaluation became less responsive.  A CT of the abdomen and pelvis was performed and during that procedure she had a large emesis and aspirated.  Upon return to the emergency department she was in respiratory distress and blood pressures were 100 systolic.  She was requiring 6-7 L of nasal cannula  oxygen to maintain O2 sats of 90%.  Was unable to get any history from her at this time.    Past Medical History    I have reviewed this patient's medical history and updated it with pertinent information if needed.   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     2017     Acute myocardial infarction (H)     2010     Cyst of pancreas     10/20/2017,Cystic nodules, multiple; s/p CT Abd Pelv     Diaphragmatic hernia without obstruction or gangrene     10/20/2017,s/p CT Abd/Pelv     Diverticulosis of large intestine without perforation or abscess without bleeding     10/22/2017,S/p fle sig, 10/22/2017     Edema     2011     Epigastric pain     2015     Essential (primary) hypertension     No Comments Provided     Gastro-esophageal reflux disease without esophagitis     2015     Osteoarthritis     2011     Other intestinal obstruction unspecified as to partial versus complete obstruction (CODE)     10/22/2017,S/p flex sig 10/22/2017     Personal history of malignant neoplasm of breast     Questionable 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     Personal history of other medical treatment (CODE)          Postmenopausal atrophic vaginitis     2012     Sepsis (H)     2017,E coli bacteremia; E coli UTI (resistant to Cipro)     Urinary tract infection     2017,Resistant to cipro     Urinary tract infection     10/21/2017,2 in , 10/13/2017 and 2017       Past Surgical History   I have reviewed this patient's surgical history and updated it with pertinent information if needed.  Past Surgical History:   Procedure Laterality Date     ANGIOPLASTY      2010,Angioplasty with 3 bare-metal stents RCA     APPENDECTOMY OPEN      ,Appendectomy     CHOLECYSTECTOMY      ,[place]     EXTRACAPSULAR CATARACT EXTRATION WITH INTRAOCULAR LENS IMPLANT      2016,bilat     HYSTERECTOMY TOTAL ABDOMINAL      ,RUEL, ovaries  remaining     MASTECTOMY SIMPLE      1993,[place]     OTHER SURGICAL HISTORY      10/24/2017,106149,OTHER,Ellen Arambula and [place]       Prior to Admission Medications   Prior to Admission Medications   Prescriptions Last Dose Informant Patient Reported? Taking?   ASPIRIN LOW DOSE 81 MG EC tablet 12/12/2018 at pm Nursing Home No Yes   Sig: TAKE 1 TABLET BY MOUTH EVERY EVENING   VITAMIN D3 1000 UNITS tablet  Nursing Home Yes No   Sig: TAKE 1 TABLET BY MOUTH ONCE DAILY (IN THE MORNING)   acetaZOLAMIDE (DIAMOX) 250 MG tablet 12/13/2018 at am Nursing Home Yes Yes   Sig: Take 250 mg by mouth 2 times daily   acetaminophen (TYLENOL) 325 MG tablet 12/11/2018 at Unknown time  Yes Yes   Sig: Take 650 mg by mouth every 4 hours as needed for mild pain   acetaminophen (TYLENOL) 500 MG tablet 12/13/2018 at am Nursing Home Yes Yes   Sig: Take 500 mg by mouth 4 times daily   alum & mag hydroxide-simethicone (MYLANTA/MAALOX) 200-200-20 MG/5ML SUSP suspension Past Week at Unknown time  Yes Yes   Sig: Take 30 mLs by mouth 3 times daily as needed for indigestion   amLODIPine (NORVASC) 5 MG tablet 12/12/2018 at pm Nursing Home Yes Yes   Sig: Take 5 mg by mouth At Bedtime   brimonidine-timolol (COMBIGAN) 0.2-0.5 % ophthalmic solution 12/13/2018 at am Nursing Home Yes Yes   Sig: Place 1 drop Into the left eye 2 times daily   brinzolamide-brimonidine (SIMBRINZA) 1-0.2 % ophthalmic suspension 12/13/2018 at am Nursing Home Yes Yes   Sig: Place 1 drop into the right eye 3 times daily   latanoprost (XALATAN) 0.005 % ophthalmic solution 12/12/2018 at pm Nursing Home Yes Yes   Sig: Place 1 drop into the right eye At Bedtime   losartan (COZAAR) 100 MG tablet 12/13/2018 at am Nursing Home No Yes   Sig: Take 1 tablet (100 mg) by mouth daily   melatonin 3 MG tablet 12/11/2018 at pm Nursing Home No No   Sig: TAKE 1 TABLET BY MOUTH NIGHTLY AT BEDTIME   order for DME Unknown at Unknown time Nursing Home No No   Sig: Equipment being ordered: Wheelchair  manual   prednisoLONE acetate (PRED FORTE) 1 % ophthalmic suspension 12/13/2018 at am Nursing Home Yes Yes   Sig: Place 1 drop into both eyes daily    ranitidine (ZANTAC) 150 MG tablet 12/13/2018 at am Nursing Home No Yes   Sig: Take 1 tablet (150 mg) by mouth 2 times daily   timolol (TIMOPTIC) 0.5 % ophthalmic solution 12/13/2018 at am Nursing Home Yes Yes   Sig: PLACE 1 DROP IN THE RIGHT EYE TWICE DAILY      Facility-Administered Medications: None     Allergies   Allergies   Allergen Reactions     Atorvastatin Other (See Comments) and Nausea and Vomiting     Myalgia     Ciprofloxacin Other (See Comments)     Erythromycin      Lisinopril Cough     Simvastatin Other (See Comments)     Achey muscles.      Sulfamethoxazole W/Trimethoprim Unknown     Nausea and shaking       Social History   I have reviewed this patient's social history and updated it with pertinent information if needed. Karey JENNINGS Khurram  reports that  has never smoked. she has never used smokeless tobacco. She reports that she does not drink alcohol or use drugs.    Family History   I have reviewed this patient's family history and updated it with pertinent information if needed.   Family History   Problem Relation Age of Onset     Breast Cancer No family hx of         Cancer-breast       Review of Systems     REVIEW OF SYSTEMS:    Unable to get any history at this time.    Physical Exam   Temp: 98.2  F (36.8  C) Temp src: Temporal BP: (!) 175/139   Heart Rate: 130 Resp: 29 SpO2: 90 % O2 Device: Nasal cannula with humidification Oxygen Delivery: 7 LPM  Vital Signs with Ranges  Temp:  [98.2  F (36.8  C)-102.1  F (38.9  C)] 98.2  F (36.8  C)  Heart Rate:  [] 130  Resp:  [12-44] 29  BP: (104-225)/() 175/139  SpO2:  [76 %-91 %] 90 %  129 lbs 4.8 oz    Constitutional: In obvious respiratory distress  Eyes: pupils reactive, extraocular movements intact. Anicteric sclera.   HEENT: TM's normal, oropharynx nonerythematous. Neck supple, no  JVD.  Respiratory: Bilateral crackles  Cardiovascular: regular, no murmur. No lower extremity edema.  GI: soft, non-tender, distended bowel sounds present.  Lymph/Hematologic: no cervical or supraclavicular LAD.  Genitourinary: deferred  Skin: no rashes, or sores  Musculoskeletal: no joint erythema or swelling  Neurologic: cranial nerves symmetric. Neuro exam nonfocal  Psychiatric: alert and oriented x3. Interactive.       Data   Data reviewed today:  I personally reviewed the abdominal CT image(s) showing a right lung pneumonitis.  Recent Labs   Lab 12/13/18  1325   WBC 8.6   HGB 13.3   MCV 92         POTASSIUM 3.5   CHLORIDE 109*   CO2 20*   BUN 30*   CR 0.69   ANIONGAP 8   KEILY 9.5   *   ALBUMIN 3.7   PROTTOTAL 6.7   BILITOTAL 0.5   ALKPHOS 44   ALT 16   AST 18   TROPI <0.030       Recent Results (from the past 24 hour(s))   CT Abdomen Pelvis w Contrast    Narrative    PROCEDURE:  CT ABDOMEN PELVIS W CONTRAST    HISTORY: Abd pain, diverticulitis suspected    TECHNIQUE:  Helical CT of the abdomen and pelvis was performed  following injection of intravenous contrast.  Ingested oral contrast  partially opacifies the bowel.      COMPARISON:  10/20/2017, 11/13/2017    MEDS/CONTRAST: 100 ml Omnipaque 350    FINDINGS:      Limited images through the lung bases demonstrate asymmetric  groundglass opacity throughout the right lung base. No effusion is  seen.     3 separate low density lesions are reidentified in the pancreas. A  cyst within the distal body/proximal tail is smaller, measuring 9 mm,  previously 15 mm. Another low-density pancreatic lesions are  subcentimeter and unchanged in caliber. Pancreatic duct remains within  normal limits in caliber.    There is intrahepatic biliary ductal dilatation, chronic. The  gallbladder is surgically absent. Symmetric nephrograms are present  without hydronephrosis. No adrenal mass is present.    The bowel is normal in caliber. The colon is almost  entirely  collapsed, with a small amount of fluid in the rectum. No focal  pericolonic inflammatory changes are seen to suggest acute  diverticulitis.    A bladder diverticulum is again present. No free air or adenopathy is  identified. No suspicious osseous lesion is seen. The bones are  osteoporotic.      Impression    IMPRESSION:      Groundglass opacity throughout the right lung base is new and  asymmetric, suggesting acute pneumonitis, potentially due to infection  or aspiration. Recommend follow-up chest radiographs.    No evidence of diverticulitis as clinically questioned. The colon is  almost entirely collapsed, with a small amount of fluid in the rectum,  suggesting possible proctocolitis.    Multiple chronic low-density pancreatic lesions, with the largest  appearing smaller in size.    Chronic intrahepatic biliary ductal dilatation.    JOCELIN CAUSEY MD   XR Chest Port 1 View    Narrative    PROCEDURE: XR CHEST PORT 1 VW 12/13/2018 3:31 PM    HISTORY: sob    COMPARISONS: 11/5/2018.    TECHNIQUE: Single view.    FINDINGS: There is extensive focal airspace disease throughout the  right lung, more confluent in the right lower lobe. This is consistent  with relatively diffuse right-sided pneumonia. Left lung is clear.  Heart is not enlarged.    Degenerative changes seen in both shoulders and there is chronic  rotator cuff disease bilaterally.         Impression    IMPRESSION: New relatively diffuse infiltrate in the right lung, most  consistent with pneumonia.    BENJIE THACKER MD

## 2018-12-13 NOTE — ED NOTES
Phone call to [facility] made, spoke with Joanne.  Joanne reports she was with pt today and pt was telling her she didn't feel well, she passed out for about 30 seconds, R 16 and shallow, pt passed out a second time.  Joanne checked her pulse and it was thready, pt's color was pale, so she called 911.  While she was on the phone, pt had emesis and diarrhea all at once.  Pt is normally independent in her room, uses a walker or W/C, is normally A&O.  Joanne updated on pt status at this time.

## 2018-12-13 NOTE — ED PROVIDER NOTES
History     Chief Complaint   Patient presents with     Nausea, Vomiting, & Diarrhea     HPI  Karey Paulson is a 89 year old female who presents emerged part with this evening for evaluation was not feeling well earlier this morning patient had an episode where she fainted lasted about 30 seconds her respiratory rate was low and shallow she then passed out a second time.  At that point her pulse was thready according to staff at [facility] patient then subsequently developed some nausea vomiting and diarrhea.  She had multiple episodes of vomiting since then at the nursing home and on the way here to the emerge department.  1-2 episodes here as well.  Patient's been afebrile she is alert she is oriented.  She does not have any headaches dizziness no visual disturbances she does not have any runny nose and skin sinus pressure sore throat or cough no chest pain or shortness of breath.  She does not have any rashes or trauma.    Problem List:    Patient Active Problem List    Diagnosis Date Noted     Aspiration pneumonia (H) 12/13/2018     Priority: Medium     Aspiration into airway 12/13/2018     Priority: Medium     Sinus pause 12/04/2018     Priority: Medium     History of myocardial infarction 12/14/10 12/04/2018     Priority: Medium     Essential hypertension 12/04/2018     Priority: Medium     Hx of cardiac cath 12/14/10 with x3 stents to the RCA 12/04/2018     Priority: Medium     First degree heart block 12/04/2018     Priority: Medium     Generalized muscle weakness 12/04/2018     Priority: Medium     Bradycardia 11/08/2018     Priority: Medium     Syncope, unspecified syncope type 10/12/2018     Priority: Medium     Primary insomnia 04/11/2018     Priority: Medium     Weakness of both lower extremities 03/27/2018     Priority: Medium     S/P laparoscopic colectomy 11/14/2017     Priority: Medium     Diverticulosis of large intestine 10/22/2017     Priority: Medium     Overview:   S/p fle sig,  10/22/2017       Stricture of sigmoid colon (H) 10/22/2017     Priority: Medium     Overview:   S/p flex sig 10/22/2017       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     Priority: Medium     Overview:        Recurrent UTI 10/21/2017     Priority: Medium     Overview:   2 in 2017, 10/13/2017 and 6/28/2017       Pancreatic cyst 10/20/2017     Priority: Medium     Overview:   Cystic nodules, multiple; s/p CT Abd Pelv       GERD 12/31/2015     Priority: Medium     ACP (advance care planning) 12/05/2013     Priority: Medium     Backache 02/24/2013     Priority: Medium     Actinic keratosis 04/18/2012     Priority: Medium     Osteoarthrosis 11/08/2011     Priority: Medium     Mixed hyperlipidemia 01/29/2009     Priority: Medium     Overview:   Overview:   IMO Update 10/11       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     Priority: Medium        Past Medical History:    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      Acute myocardial infarction (H)      Cyst of pancreas      Diaphragmatic hernia without obstruction or gangrene      Diverticulosis of large intestine without perforation or abscess without bleeding      Edema      Epigastric pain      Essential (primary) hypertension      Gastro-esophageal reflux disease without esophagitis      Osteoarthritis      Other intestinal obstruction unspecified as to partial versus complete obstruction (CODE)      Personal history of malignant neoplasm of breast      Personal history of other medical treatment (CODE)      Postmenopausal atrophic vaginitis      Sepsis (H)      Urinary tract infection      Urinary tract infection        Past Surgical History:    Past Surgical History:   Procedure Laterality Date     ANGIOPLASTY      12-,Angioplasty with 3 bare-metal stents RCA     APPENDECTOMY OPEN      1948,Appendectomy     CHOLECYSTECTOMY      1995,[facility]     EXTRACAPSULAR CATARACT EXTRATION WITH  "INTRAOCULAR LENS IMPLANT      12/2016,bilat     HYSTERECTOMY TOTAL ABDOMINAL      1967,[facility], ovaries remaining     MASTECTOMY SIMPLE      1993,[facility]     OTHER SURGICAL HISTORY      10/24/2017,[id number],OTHER,Ellen Arambula and Jairo       Family History:    Family History   Problem Relation Age of Onset     Breast Cancer No family hx of         Cancer-breast       Social History:  Marital Status:   [5]  Social History     Tobacco Use     Smoking status: Never Smoker     Smokeless tobacco: Never Used   Substance Use Topics     Alcohol use: No     Alcohol/week: 0.0 oz     Drug use: No     Comment: Drug use: No        Medications:      acetaZOLAMIDE (DIAMOX) 250 MG tablet   amLODIPine (NORVASC) 5 MG tablet   ASPIRIN LOW DOSE 81 MG EC tablet   brimonidine (ALPHAGAN P) 0.1 % ophthalmic solution   COMBIGAN 0.2-0.5 % ophthalmic solution   latanoprost (XALATAN) 0.005 % ophthalmic solution   losartan (COZAAR) 100 MG tablet   MAPAP 500 MG tablet   melatonin 3 MG tablet   nitroFURantoin, macrocrystal-monohydrate, (MACROBID) 100 MG capsule   order for DME   prednisoLONE acetate (PRED FORTE) 1 % ophthalmic susp   ranitidine (ZANTAC) 150 MG tablet   SIMBRINZA 1-0.2 % ophthalmic suspension   timolol (TIMOPTIC) 0.5 % ophthalmic solution   VITAMIN D3 1000 UNITS tablet         Review of Systems    ROS: 10 point ROS neg other than the symptoms noted above in the HPI.      Physical Exam   BP: 133/76  Heart Rate: 72  Temp: 99.1  F (37.3  C)  Resp: 26  Height: 167.6 cm (5' 6\")  Weight: 62.1 kg (137 lb)  SpO2: 90 %      Physical Exam   Exam:  Constitutional: Ill-appearing  Head: Normocephalic. No masses, lesions, tenderness or abnormalities  ENT: ENT exam normal, no neck nodes or sinus tenderness  Cardiovascular: negative, PMI normal. No lifts, heaves, or thrills. RRR. No murmurs, clicks gallops or rub  Respiratory: Lungs RLL crackle  Gastrointestinal: Abdomen soft, non-tender. BS normal. No masses, organomegaly  : " Deferred  Musculoskeletal: extremities normal- no gross deformities noted, gait normal and normal muscle tone  Skin: no suspicious lesions or rashes  Neurologic: Was all 4 extremities appropriately  Psychiatric: Tired and fatigued        ED Course        Procedures           EKG 1. sinus rhythm no ST elevation ST depression or signs of ischemia ventricular rate of 84 bpm QTC of 418 ms.  EKG 2. Tachycardia 105 otherwise similar to previous              Results for orders placed or performed during the hospital encounter of 12/13/18 (from the past 24 hour(s))   CBC with platelets differential   Result Value Ref Range    WBC 8.6 4.0 - 11.0 10e9/L    RBC Count 4.60 3.8 - 5.2 10e12/L    Hemoglobin 13.3 11.7 - 15.7 g/dL    Hematocrit 42.1 35.0 - 47.0 %    MCV 92 78 - 100 fl    MCH 28.9 26.5 - 33.0 pg    MCHC 31.6 31.5 - 36.5 g/dL    RDW 14.5 10.0 - 15.0 %    Platelet Count 244 150 - 450 10e9/L    Diff Method Automated Method     % Neutrophils 93.1 %    % Lymphocytes 2.3 %    % Monocytes 2.8 %    % Eosinophils 1.3 %    % Basophils 0.3 %    % Immature Granulocytes 0.2 %    Absolute Neutrophil 8.0 1.6 - 8.3 10e9/L    Absolute Lymphocytes 0.2 (L) 0.8 - 5.3 10e9/L    Absolute Monocytes 0.2 0.0 - 1.3 10e9/L    Absolute Eosinophils 0.1 0.0 - 0.7 10e9/L    Absolute Basophils 0.0 0.0 - 0.2 10e9/L    Abs Immature Granulocytes 0.0 0 - 0.4 10e9/L   Comprehensive metabolic panel   Result Value Ref Range    Sodium 137 134 - 144 mmol/L    Potassium 3.5 3.5 - 5.1 mmol/L    Chloride 109 (H) 98 - 107 mmol/L    Carbon Dioxide 20 (L) 21 - 31 mmol/L    Anion Gap 8 3 - 14 mmol/L    Glucose 188 (H) 70 - 105 mg/dL    Urea Nitrogen 30 (H) 7 - 25 mg/dL    Creatinine 0.69 0.60 - 1.20 mg/dL    GFR Estimate 79 >60 mL/min/1.7m2    GFR Estimate If Black >90 >60 mL/min/1.7m2    Calcium 9.5 8.6 - 10.3 mg/dL    Bilirubin Total 0.5 0.3 - 1.0 mg/dL    Albumin 3.7 3.5 - 5.7 g/dL    Protein Total 6.7 6.4 - 8.9 g/dL    Alkaline Phosphatase 44 34 - 104 U/L     ALT 16 7 - 52 U/L    AST 18 13 - 39 U/L   Troponin I   Result Value Ref Range    Troponin I ES <0.030 0.000 - 0.034 ug/L   Magnesium   Result Value Ref Range    Magnesium 2.0 1.9 - 2.7 mg/dL   Clostridium difficile toxin B PCR   Result Value Ref Range    Specimen Description Feces     C Diff Toxin B PCR Negative NEG^Negative   UA reflex to Microscopic and Culture   Result Value Ref Range    Color Urine Yellow     Appearance Urine Clear     Glucose Urine Negative NEG^Negative mg/dL    Bilirubin Urine Negative NEG^Negative    Ketones Urine Negative NEG^Negative mg/dL    Specific Gravity Urine 1.020 1.000 - 1.030    Blood Urine Negative NEG^Negative    pH Urine 6.5 5.0 - 9.0 pH    Protein Albumin Urine Negative NEG^Negative mg/dL    Urobilinogen Urine 0.2 0.2 - 1.0 EU/dL    Nitrite Urine Negative NEG^Negative    Leukocyte Esterase Urine Negative NEG^Negative    Source Midstream Urine    CT Abdomen Pelvis w Contrast    Narrative    PROCEDURE:  CT ABDOMEN PELVIS W CONTRAST    HISTORY: Abd pain, diverticulitis suspected    TECHNIQUE:  Helical CT of the abdomen and pelvis was performed  following injection of intravenous contrast.  Ingested oral contrast  partially opacifies the bowel.      COMPARISON:  10/20/2017, 11/13/2017    MEDS/CONTRAST: 100 ml Omnipaque 350    FINDINGS:      Limited images through the lung bases demonstrate asymmetric  groundglass opacity throughout the right lung base. No effusion is  seen.     3 separate low density lesions are reidentified in the pancreas. A  cyst within the distal body/proximal tail is smaller, measuring 9 mm,  previously 15 mm. Another low-density pancreatic lesions are  subcentimeter and unchanged in caliber. Pancreatic duct remains within  normal limits in caliber.    There is intrahepatic biliary ductal dilatation, chronic. The  gallbladder is surgically absent. Symmetric nephrograms are present  without hydronephrosis. No adrenal mass is present.    The bowel is normal in  caliber. The colon is almost entirely  collapsed, with a small amount of fluid in the rectum. No focal  pericolonic inflammatory changes are seen to suggest acute  diverticulitis.    A bladder diverticulum is again present. No free air or adenopathy is  identified. No suspicious osseous lesion is seen. The bones are  osteoporotic.      Impression    IMPRESSION:      Groundglass opacity throughout the right lung base is new and  asymmetric, suggesting acute pneumonitis, potentially due to infection  or aspiration. Recommend follow-up chest radiographs.    No evidence of diverticulitis as clinically questioned. The colon is  almost entirely collapsed, with a small amount of fluid in the rectum,  suggesting possible proctocolitis.    Multiple chronic low-density pancreatic lesions, with the largest  appearing smaller in size.    Chronic intrahepatic biliary ductal dilatation.    JOCELIN CAUSEY MD   XR Chest Port 1 View    Narrative    PROCEDURE: XR CHEST PORT 1 VW 12/13/2018 3:31 PM    HISTORY: sob    COMPARISONS: 11/5/2018.    TECHNIQUE: Single view.    FINDINGS: There is extensive focal airspace disease throughout the  right lung, more confluent in the right lower lobe. This is consistent  with relatively diffuse right-sided pneumonia. Left lung is clear.  Heart is not enlarged.    Degenerative changes seen in both shoulders and there is chronic  rotator cuff disease bilaterally.         Impression    IMPRESSION: New relatively diffuse infiltrate in the right lung, most  consistent with pneumonia.    BENJIE THACKER MD       Medications   0.9% sodium chloride BOLUS (0 mLs Intravenous Stopped 12/13/18 1423)     Followed by   sodium chloride 0.9% infusion (1,000 mLs Intravenous New Bag 12/13/18 1423)   ondansetron (ZOFRAN) injection 4 mg (4 mg Intravenous Given 12/13/18 1343)   albuterol (PROVENTIL) neb solution 2.5 mg (2.5 mg Nebulization Not Given 12/13/18 1542)   ampicillin-sulbactam (UNASYN) 3 g vial to  attach to  mL bag (not administered)   prochlorperazine (COMPAZINE) injection 5 mg (5 mg Intravenous Given 18 1519)   iohexol (OMNIPAQUE) 350 mg/mL solution 100 mL (100 mLs Intravenous Given 18 1440)   albuterol (PROVENTIL) neb solution 2.5 mg (2.5 mg Nebulization Given 18 1543)       Assessments & Plan (with Medical Decision Making)     I have reviewed the nursing notes.    I have reviewed the findings, diagnosis, plan and need for follow up with the patient.  Differential diagnosis at this point include: acute coronary syndromes, acute aortic dissection, pulmonary embolism, pneumothorax, pneumonia, non-emergent sources of chest wall pain, pericarditis and myocarditis, appendicitis, aortic aneurysm, mesenteric ischemia, bowel perforation, bowel obstruction, inflammatory bowel diseases, cholecystitis, pancreatitis, hepatitis, gastritis, GERD, diverticulitis, PUD, pyelonephritis/UTI, renal colic/stone, GC/chlamydia, PID, cervicitis, endometritis, IUP, ectopic pregnancy, dysfunctional uterine bleeding, ovarian cyst/torsion, spontaneous , AAA, aspiration pneumonia, diverticulitis as well as other etiologies.  Pleasant 89-year-old female who presents management to see me for evaluation from the nursing home.  Episodes of nausea vomiting diarrhea after an episode of fainting.  Sounds like she probably had a syncopal episode.  The concern at this point would be aspiration as the amount of vomitus. Noted to the patient's oxygen saturation were low 90s.  Throughout treatment department course it seems as though if she became more more hypoxic down to the mid 80s 85-90.  Had some episodes of restlessness.   Serial examinations reveal the patient's oxygen saturations are 85%.  She has coarse lung sounds in the right lower lobe.  Patient is tachypneic, increased confusion noted.  Imaging studies as noted above noted for aspiration pneumonitis.  No evidence of any diverticulitis on  examination.  Patient will be started Unasyn  Patient will receive oxygen therapy, IV hydration as noted above and she did receive antiemetics as well as nebs as needed.  Discussed case with the hospitalist service.  Independently reviewed the radiographs EKG coordinated care and  reviewed patient medical record.  Spoken with the nursing home as well in regards to the patient's status as well as DNR/DNI status.  Patient will be admitted to the ICU.  Aggregate Critical Care Time is 55 minutes.  This was the time seeing the patient at the bedside while the patient was critical.  My time did not include any pertinent procedures or activities that did not contribute to the patient's care while the patient was critical.         ED to Inpatient Handoff:    Discussed with Dr. Fall at 15:46  Patient accepted for Inpatient Stay  Pending studies include None  Code Status: DNR/DNI              Medication List      There are no discharge medications for this visit.         Final diagnoses:   Nausea vomiting and diarrhea   Aspiration pneumonitis (H)   Acute respiratory failure with hypoxia (H)   Syncope, unspecified syncope type       12/13/2018   [facility]     Mark Appiah PA-C  12/13/18 4040

## 2018-12-14 PROBLEM — J96.01 ACUTE RESPIRATORY FAILURE WITH HYPOXIA (H): Status: ACTIVE | Noted: 2018-01-01

## 2018-12-14 NOTE — PLAN OF CARE
No urination this shift therefore bladder scanned for 200ml. No intervention required at this time. Patient slept very well after administration of Morphine for pain in abdomen.

## 2018-12-14 NOTE — ASSESSMENT & PLAN NOTE
Result of aspiration event while in CT scanner  Had acute respiratory failure requiring transfer to the ICU with increased oxygen support  Started on zosyn for antibiotic coverage  Has been weaned off oxygen, denies any respiratory distress  Continue to monitor, possibly switch to oral antibiotics tomorrow

## 2018-12-14 NOTE — ASSESSMENT & PLAN NOTE
Taking zantac at home  Currently symptomatic with epigastric discomfort  We will start IV Protonix, follow

## 2018-12-14 NOTE — ASSESSMENT & PLAN NOTE
Hx of MI in 2010 with stent placement timew 3  Recently seen by cardiology for bradycardia, with recommendation for elective pacemaker insertion  Asymptomatic, but having episodes of bradycardia into the 30s on telemetry  Continue to monitor  If becomes symptomatic with prolonged episodes, may need emergent transfer for pacemaker insertion

## 2018-12-14 NOTE — PROGRESS NOTES
Patient is alert, appropriate, taking some fluids  Blood pressures stable  Will plan to transfer to [facility] surg  Continue treatment for aspiration pneumonia  Electronically signed by ARNALDO Brooks on December 14, 2018

## 2018-12-14 NOTE — PLAN OF CARE
Patient alert and talking. Taking sips of water and a few bites of soup without any issues. Patient on 3L of O2, and does not display labored breathing. Patient given fluid bolus and blood pressures are now WNL and HR in 80s. Patient still incontinent and having a few episodes of diarrhea, no fevers.

## 2018-12-14 NOTE — PROGRESS NOTES
:    Telephone contact with Delmis GLORIA from [facility].  Provided with a discharge update.  Anticipated discharge back to [facility] 2-3 days.      ELLE Arauz on 12/14/2018 at 12:35 PM

## 2018-12-14 NOTE — PROGRESS NOTES
[facility]    Medicine Progress Note - Hospitalist Service       Date of Admission:  12/13/2018  Assessment & Plan    Admitted yesterday with sxs at home with gastroenteritis with nausea, vomiting and diarrhea, likely viral from family who has been sick as well. Yesterday during work-up, had an aspiration event while in CT scanner with immediation decompensation of her breathing. She has been admitted and is being treated for aspiration pneumonia with IV zosyn..Overnite better with less oxygen needs. Still remains somewhat sleepy, not eating well and having borderline blood pressures. Will continue current antibiotics, continue fluids and continue to monitor.    Aspiration pneumonia (H)  Result of aspiration event while in CT scanner  Had acute respiratory failure requiring transfer to the ICU with increased oxygen support  Started on zosyn for antibiotic coverage  Overnite has improved, now on 3 liters oxygen, borderline blood pressures, not making adequate urine  Continue zosyn, bolus with fluids, follow I&o's  When starting to eat monitor for signs of aspiration, if so would need SLR referral    Acute respiratory failure with hypoxia (H)  Secondary to aspiration  Improved overnite, currently on 3 liters  Follow, encourage deep breaths    GERD  Taking zantac at home  Restart when able    CAD (coronary artery disease)  Hx of MI in 2010 with stent placement timew 3  Recently seen by cardiology, with recommendation for elective pacemaker insertion  No current CAD sxs, normal heart rate  Follow, OP management by cardiology          Diet: NPO for Medical/Clinical Reasons Except for: Ice Chips[unfilled]  DVT Prophylaxis: Pneumatic Compression Devices  Dowling Catheter: not present  Code Status: DNR/DNI[unfilled]    Disposition Plan   Expected discharge: 2 - 3 days, recommended to prior living arrangement once adequate pain management/ tolerating PO medications, antibiotic plan established and  oxygenating normally.  Entered: Angel Brooks MD 12/14/2018, 9:27 AM       The patient's care was discussed with the Bedside Nurse and Patient.    Angel Brooks MD  Hospitalist Service  [facility]    ______________________________________________________________________    Interval History   Doing better overnite, with oxygen needs improving, now on 3 liters. Still sleepy, but aroused easily. Having some mild lower abdominal pain, but not feeling nauseated and no more emesis. Denies shortness of breath, cough.     Data reviewed today: I reviewed all medications, new labs and imaging results over the last 24 hours. I personally reviewed no images or EKG's today.    Physical Exam   Vital Signs: Temp: 97.7  F (36.5  C) Temp src: Temporal BP: 113/45   Heart Rate: 76 Resp: 18 SpO2: 92 % O2 Device: Nasal cannula Oxygen Delivery: 2 LPM  Weight: 129 lbs 4.8 oz  Constitutional: fatigued, alert, cooperative and no apparent distress  ENT: Normocephalic, without obvious abnormality, atraumatic, sinuses nontender on palpation, external ears without lesions, oral pharynx with moist mucous membranes, tonsils without erythema or exudates, gums normal and good dentition.  Hematologic / Lymphatic: no cervical lymphadenopathy and no supraclavicular lymphadenopathy  Respiratory: no increased work of breathing, good air exchange, no retractions and clear to auscultation  Cardiovascular: regular rate and rhythm  GI: normal bowel sounds, soft, non-distended, tenderness noted in the right lower quadrant, voluntary guarding absent and no masses palpated  Skin: no bruising or bleeding  Musculoskeletal: There is no redness, warmth, or swelling of the joints.  Full range of motion noted.  Motor strength is 5 out of 5 all extremities bilaterally.  Tone is normal.    Data   Recent Labs   Lab 12/14/18  0920 12/13/18  1325   WBC 19.1* 8.6   HGB 12.2 13.3   MCV 92 92    244   NA  --  137   POTASSIUM  --   3.5   CHLORIDE  --  109*   CO2  --  20*   BUN  --  30*   CR  --  0.69   ANIONGAP  --  8   KEILY  --  9.5   GLC  --  188*   ALBUMIN  --  3.7   PROTTOTAL  --  6.7   BILITOTAL  --  0.5   ALKPHOS  --  44   ALT  --  16   AST  --  18   TROPI  --  <0.030     Recent Results (from the past 24 hour(s))   CT Abdomen Pelvis w Contrast    Narrative    PROCEDURE:  CT ABDOMEN PELVIS W CONTRAST    HISTORY: Abd pain, diverticulitis suspected    TECHNIQUE:  Helical CT of the abdomen and pelvis was performed  following injection of intravenous contrast.  Ingested oral contrast  partially opacifies the bowel.      COMPARISON:  10/20/2017, 11/13/2017    MEDS/CONTRAST: 100 ml Omnipaque 350    FINDINGS:      Limited images through the lung bases demonstrate asymmetric  groundglass opacity throughout the right lung base. No effusion is  seen.     3 separate low density lesions are reidentified in the pancreas. A  cyst within the distal body/proximal tail is smaller, measuring 9 mm,  previously 15 mm. Another low-density pancreatic lesions are  subcentimeter and unchanged in caliber. Pancreatic duct remains within  normal limits in caliber.    There is intrahepatic biliary ductal dilatation, chronic. The  gallbladder is surgically absent. Symmetric nephrograms are present  without hydronephrosis. No adrenal mass is present.    The bowel is normal in caliber. The colon is almost entirely  collapsed, with a small amount of fluid in the rectum. No focal  pericolonic inflammatory changes are seen to suggest acute  diverticulitis.    A bladder diverticulum is again present. No free air or adenopathy is  identified. No suspicious osseous lesion is seen. The bones are  osteoporotic.      Impression    IMPRESSION:      Groundglass opacity throughout the right lung base is new and  asymmetric, suggesting acute pneumonitis, potentially due to infection  or aspiration. Recommend follow-up chest radiographs.    No evidence of diverticulitis as clinically  questioned. The colon is  almost entirely collapsed, with a small amount of fluid in the rectum,  suggesting possible proctocolitis.    Multiple chronic low-density pancreatic lesions, with the largest  appearing smaller in size.    Chronic intrahepatic biliary ductal dilatation.    JOECLIN CAUSEY MD   XR Chest Port 1 View    Narrative    PROCEDURE: XR CHEST PORT 1 VW 12/13/2018 3:31 PM    HISTORY: sob    COMPARISONS: 11/5/2018.    TECHNIQUE: Single view.    FINDINGS: There is extensive focal airspace disease throughout the  right lung, more confluent in the right lower lobe. This is consistent  with relatively diffuse right-sided pneumonia. Left lung is clear.  Heart is not enlarged.    Degenerative changes seen in both shoulders and there is chronic  rotator cuff disease bilaterally.         Impression    IMPRESSION: New relatively diffuse infiltrate in the right lung, most  consistent with pneumonia.    BENJIE THACKER MD

## 2018-12-15 PROBLEM — A08.11 GASTROENTERITIS DUE TO NOROVIRUS: Status: ACTIVE | Noted: 2018-01-01

## 2018-12-15 NOTE — PLAN OF CARE
"Pt c/o mid, lower abd pain and nausea. PRN Zofran given. Pt had multiple loose/watery stools throughout the shift. HOB kept >30 degrees per aspiration precautions. /63   Temp 98.2  F (36.8  C) (Tympanic)   Resp 18   Ht 1.676 m (5' 6\")   Wt 58.7 kg (129 lb 4.8 oz)   SpO2 94%   BMI 20.87 kg/m   1L O2. Rhea Triana RN on 12/15/2018 at 5:34 AM    "

## 2018-12-15 NOTE — ASSESSMENT & PLAN NOTE
Presenting with acute nausea and vomiting with diarrhea at home  Stool cultures positive for norovirus  Continue symptomatic cares, IV fluids

## 2018-12-15 NOTE — PROGRESS NOTES
[facility]    Medicine Progress Note - Hospitalist Service       Date of Admission:  12/13/2018  Admitted with nausea, vomiting and profuse diarrhea.  Due to abdominal pain she had had a CT scan performed in the ED, unfortunately she vomited during that procedure and developed aspiration pneumonia.  Was admitted to the ICU and transferred to the medical floor yesterday.  Breathing wise she is doing much better, on minimal oxygen support but still having abdominal pain with nausea, vomiting and some diarrhea.  Stool cultures are now positive for norovirus.  Today we will continue supportive cares, continues on IV Zosyn for aspiration pneumonia and will continue IV fluids, antiemetics for symptomatic relief.  Expect she will be in the hospital for at least 1-2 more days  Assessment & Plan   Aspiration pneumonia (H)  Result of aspiration event while in CT scanner  Had acute respiratory failure requiring transfer to the ICU with increased oxygen support  Started on zosyn for antibiotic coverage  Currently on minimal oxygen support and denies shortness of breath  Continue to wean oxygen as able, continue current antibiotics    Acute respiratory failure with hypoxia (H)  Secondary to aspiration  Improved overnite, currently on 2 liters  Follow, encourage deep breaths    GERD  Taking zantac at home  Currently symptomatic with epigastric discomfort  We will start IV Protonix, follow    CAD (coronary artery disease)  Hx of MI in 2010 with stent placement timew 3  Recently seen by cardiology for bradycardia, with recommendation for elective pacemaker insertion  Asymptomatic, but having episodes of bradycardia into the 30s on telemetry  Continue to monitor  If becomes symptomatic with prolonged episodes, may need emergent transfer for pacemaker insertion    Gastroenteritis due to norovirus  Presenting with acute nausea and vomiting with diarrhea at home  Stool cultures positive for norovirus  Continue  symptomatic cares, IV fluids          Diet: Regular Diet Adult    DVT Prophylaxis: Pneumatic Compression Devices  Dowling Catheter: not present  Code Status: DNR/DNI      Disposition Plan   Expected discharge: 1-2 more days, recommended to prior living arrangement once adequate pain management/ tolerating PO medications, antibiotic plan established and renal function improved.  Entered: Angel Brooks MD 12/15/2018, 9:17 AM       The patient's care was discussed with the Bedside Nurse and Patient.    Angel Brooks MD  Hospitalist Service  [facility]    ______________________________________________________________________    Interval History   Continues to have persistent nausea with vomiting.  Minimal stools overnight.  Breathing is improved with no cough.  Continues on low-dose oxygen.  Denies fever, chills.  Just feels uncomfortable    Data reviewed today: I reviewed all medications, new labs and imaging results over the last 24 hours. I personally reviewed no images or EKG's today.    Physical Exam   Vital Signs: Temp: 98.9  F (37.2  C) Temp src: Tympanic BP: 195/90 Pulse: 89 Heart Rate: 83 Resp: 16 SpO2: 92 % O2 Device: Nasal cannula Oxygen Delivery: 1 LPM  Weight: 137 lbs 12.6 oz  Constitutional: awake, alert, cooperative and mild distress  Respiratory: No increased work of breathing, good air exchange, clear to auscultation bilaterally, no crackles or wheezing  Cardiovascular: regular rate and rhythm  GI: soft, non-distended and tenderness noted in the epigastric region  Skin: no bruising or bleeding    Data   Recent Labs   Lab 12/15/18  0420 12/14/18 2010 12/14/18  0920 12/13/18  1325   WBC  --   --  19.1* 8.6   HGB  --   --  12.2 13.3   MCV  --   --  92 92   PLT  --   --  172 244   NA  --   --  146* 137   POTASSIUM 3.3* 3.0* 3.1* 3.5   CHLORIDE  --   --  117* 109*   CO2  --   --  20* 20*   BUN  --   --  33* 30*   CR  --   --  0.98 0.69   ANIONGAP  --   --  9 8   KEILY  --    --  8.2* 9.5   GLC  --   --  118* 188*   ALBUMIN  --   --  2.9* 3.7   PROTTOTAL  --   --  5.5* 6.7   BILITOTAL  --   --  0.5 0.5   ALKPHOS  --   --  29* 44   ALT  --   --  15 16   AST  --   --  24 18   TROPI  --   --   --  <0.030

## 2018-12-15 NOTE — PROGRESS NOTES
Call was received from lab that pt's stool was positive for Norovirus. Rhea Triana RN on 12/15/2018 at 2:22 AM

## 2018-12-15 NOTE — PROGRESS NOTES
Pt transferred via bed with 2L NC in place. IV sl'd and restarted on arrival to room. Accompanied by Ben and Osmany MCNULTY.  Oriented to room and call light.  Will report to Rhea MCNULTY.  Bed alarms in place.

## 2018-12-15 NOTE — PHARMACY
Pharmacy - Transfer Medication Reconciliation     The patient's transfer medication orders have been compared to the medication administration record and to the Prior to Admissions Medications list - any noted discrepancies were resolved with the MD.   No home medications ordered at this time.  Thank you. Pharmacy will continue to monitor.     Audrey Guzman [facility] ....................  12/15/2018   7:53 AM

## 2018-12-15 NOTE — PHARMACY-CONSULT NOTE
"Pharmacy: Patient Own Medication Identification    The requirements of Policy 13-03 from the Pharmacy Policy Manual have been met and the patient will be allowed to use their own supply of: simbrinza (1%brinzolamide 0.25 % brimondine) eye drops and combigan (0.2% brimonidine 0.5 % timolol) eye drops.    Audrey Guzman RP has verified the name, dose, route, and directions for each medication. The integrity has been assessed and deemed safe.     The product(s) have been labeled as being inspected by a pharmacist and the medication has been placed in the patient-specific bin in the medication room.    Nursing will remove medication at the appropriately scheduled times and document the administration on the MAR with the designation of \"patient own\".    Nursing to return medication back to patient at time of discharge.     Audrey Guzman RPH ....................  12/15/2018   9:39 AM    "

## 2018-12-16 NOTE — PROGRESS NOTES
[facility]    Medicine Progress Note - Hospitalist Service       Date of Admission:  12/13/2018  Assessment & Plan   Aspiration pneumonia (H)  Result of aspiration event while in CT scanner  Had acute respiratory failure requiring transfer to the ICU with increased oxygen support  Started on zosyn for antibiotic coverage  Has been weaned off oxygen, denies any respiratory distress  Continue to monitor, possibly switch to oral antibiotics tomorrow    Acute respiratory failure with hypoxia (H)  Secondary to aspiration  Improved, currently on room air  Continue to monitor    GERD  Taking zantac at home  Currently symptomatic with epigastric discomfort  We will start IV Protonix, follow    CAD (coronary artery disease)  Hx of MI in 2010 with stent placement timew 3  Recently seen by cardiology for bradycardia, with recommendation for elective pacemaker insertion  Asymptomatic, but having episodes of bradycardia into the 30s on telemetry  Continue to monitor  If becomes symptomatic with prolonged episodes, may need emergent transfer for pacemaker insertion    Gastroenteritis due to norovirus  Presenting with acute nausea and vomiting with diarrhea at home  Stool cultures positive for norovirus  Continue symptomatic cares, IV fluids          Diet: Regular Diet Adult    DVT Prophylaxis: Pneumatic Compression Devices  Dowling Catheter: not present  Code Status: DNR/DNI      Disposition Plan   Expected discharge: 1-2 days, recommended to prior living arrangement once adequate pain management/ tolerating PO medications and antibiotic plan established.  Entered: Angel Brooks MD 12/16/2018, 9:25 AM       The patient's care was discussed with the Bedside Nurse and Patient.    Angel Brooks MD  Hospitalist Service  [facility]    ______________________________________________________________________    Interval History   Feeling better, denies vomiting or diarrhea.  Taking  in minimal oral nourishment.  Has not been out of bed since admission.  Denies abdominal pain.  Complains of a raspy voice with occasional cough.  Is not requiring supplemental oxygen at this time.    Data reviewed today: I reviewed all medications, new labs and imaging results over the last 24 hours. I personally reviewed no images or EKG's today.    Physical Exam   Vital Signs: Temp: 98.4  F (36.9  C) Temp src: Tympanic BP: 178/64 Pulse: 72 Heart Rate: 78 Resp: 20 SpO2: 93 % O2 Device: None (Room air) Oxygen Delivery: 1 LPM(weaned to room air)  Weight: 140 lbs 10.46 oz  Constitutional: awake, alert, cooperative, no apparent distress, and appears stated age  ENT: Normocephalic, without obvious abnormality, atraumatic, sinuses nontender on palpation, external ears without lesions, oral pharynx with moist mucous membranes, tonsils without erythema or exudates, gums normal and good dentition.  Hematologic / Lymphatic: no cervical lymphadenopathy and no supraclavicular lymphadenopathy  Respiratory: no increased work of breathing, good air exchange, no retractions and rhonchi diffuse  Cardiovascular: regular rate and rhythm  GI: normal bowel sounds, soft, non-distended and non-tender  Skin: no bruising or bleeding  Musculoskeletal: no lower extremity pitting edema present  there is no redness, warmth, or swelling of the joints    Data   Recent Labs   Lab 12/16/18  0524 12/15/18  0420 12/14/18 2010 12/14/18  0920 12/13/18  1325   WBC 19.8* 21.3*  --  19.1* 8.6   HGB 11.1* 12.1  --  12.2 13.3   MCV 88 91  --  92 92    178  --  172 244    144  --  146* 137   POTASSIUM 3.1* 3.4*  3.3* 3.0* 3.1* 3.5   CHLORIDE 114* 118*  --  117* 109*   CO2 19* 15*  --  20* 20*   BUN 21 27*  --  33* 30*   CR 0.49* 0.67  --  0.98 0.69   ANIONGAP 7 11  --  9 8   KEILY 8.6 8.9  --  8.2* 9.5   GLC 74 85  --  118* 188*   ALBUMIN 2.7*  --   --  2.9* 3.7   PROTTOTAL 5.2*  --   --  5.5* 6.7   BILITOTAL 0.7  --   --  0.5 0.5   ALKPHOS 34   --   --  29* 44   ALT 14  --   --  15 16   AST 23  --   --  24 18   TROPI  --   --   --   --  <0.030

## 2018-12-16 NOTE — PLAN OF CARE
"Pt confused and slightly combative when taking over care at 2300. Since receiving PRN Seroquel and Cogentin pt has been resting. Pt repositioned q2hrs and checked for incontinence. /52   Pulse 72   Temp 98.8  F (37.1  C) (Tympanic)   Resp 22   Ht 1.676 m (5' 6\")   Wt 62.5 kg (137 lb 12.6 oz)   SpO2 99%   BMI 22.24 kg/m   room air. Rhea Triana RN on 12/16/2018 at 4:38 AM      "

## 2018-12-16 NOTE — PROGRESS NOTES
12/16/18 1100   Quick Adds   Type of Visit Initial PT Evaluation   Living Environment   Lives With alone   Living Arrangements assisted living   Home Accessibility no concerns   Self-Care   Current Activity Tolerance poor   Equipment Currently Used at Home wheelchair, manual   Functional Level Prior   Ambulation 2-->assistive person   Transferring 2-->assistive person   General Information   Onset of Illness/Injury or Date of Surgery - Date 12/13/18   Referring Physician     Patient/Family Goals Statement None stated    Pertinent History of Current Problem (include personal factors and/or comorbidities that impact the POC) Patient admitted with aspiration pneumonia, and recent diagnosis of norovirus.  Patient resides at [facility].  Uses a manual wheelchair propelled by feet for mobility per notes.  Patient is not ambulatory, but pivot transfers to wheelchair with staff assist.  Patient has dementia and is not able to provide additional information.    Precautions/Limitations fall precautions   Weight-Bearing Status - LUE full weight-bearing   Cognitive Status Examination   Orientation not oriented to person, place or time   Level of Consciousness confused   Follows Commands and Answers Questions able to follow single-step instructions   Personal Safety and Judgment impaired   Memory impaired   Pain Assessment   Patient Currently in Pain No   Posture    Posture Forward head position   Range of Motion (ROM)   ROM Comment Hip, knee and ankle ROM is WFL for transfers.    Strength   Strength Comments Not formally assessed due to dementia, but patient garry to assist with standing and pivot trnasfer.    Bed Mobility   Bed Mobility Bed mobility skill: Supine to sit   Bed Mobility Skill: Supine to Sit   Level of [place]: Supine/Sit moderate assist (50% patients effort)   Physical Assist/Nonphysical Assist: Supine/Sit 2 persons   Transfer Skills   Transfer Transfer Skill: Bed to Chair/Chair to  Bed;Transfer Skill: Sit to Stand   Transfer Skill: Bed/Chair   Level of [place]: Bed/Chair moderate assist (50% patients effort)   Physical/Nonphysical Assist: Bed/Chair 2 persons   Weightbearing Restrictions: Bed/Chair full weight-bearing   Transfer Skill:  Sit to Stand   Level of [place]: Sit/Stand moderate assist (50% patients effort)   Physical Assist/Nonphysical Assist: Sit/Stand 2 persons   Weightbearing Restrictions: Sit/Stand full weight-bearing   Balance   Balance other (describe)   Balance Comments impaired    General Therapy Interventions   Planned Therapy Interventions transfer training;bed mobility training   Clinical Impression   Criteria for Skilled Therapeutic Intervention yes, treatment indicated   PT Diagnosis Impaired mobility    Influenced by the following impairments weakness, impaired balance    Functional limitations due to impairments Difficulty with transfers.    Clinical Presentation Stable/Uncomplicated   Clinical Presentation Rationale Patient mobility is near baseline   Clinical Decision Making (Complexity) Low complexity   Therapy Frequency` daily   Predicted Duration of Therapy Intervention (days/wks) 2-3 days   Anticipated Discharge Disposition Other (see comments)  (Return to assited living )   Risk & Benefits of therapy have been explained Yes   Patient, Family & other staff in agreement with plan of care Yes   Total Evaluation Time   Total Evaluation Time (Minutes) 15

## 2018-12-16 NOTE — PLAN OF CARE
Discharge Planner PT   Patient plan for discharge: Unable to state due to dementia  Current status: Requires Mod assist x 2 for transfers  Barriers to return to prior living situation: none   Recommendations for discharge: Return to assisted living vs. SNF   Rationale for recommendations: Patient requires Mod assist x 2 for transfers.        Entered by: Rian Marie 12/16/2018 12:14 PM

## 2018-12-16 NOTE — PLAN OF CARE
"/62   Pulse 72   Temp 99.2  F (37.3  C) (Tympanic)   Resp 18   Ht 1.676 m (5' 6\")   Wt 62.5 kg (137 lb 12.6 oz)   SpO2 94%   BMI 22.24 kg/m          Pt confused to place and time.  Trying to go home and undressing.  Pt moved to room 305 for increased monitoring.  Seroquel and Cogentin ordered prn.  Pt anxious in bed needing reorientation to place and time.  Incontinent of bowel and urine.  Continue to monitor  Amy Mosquera RN.............................12/15/2018 9:56 PM    "

## 2018-12-16 NOTE — PLAN OF CARE
"Patient has denied nausea, no vomiting today. She denies abdominal tenderness and pain. Pt has not any BMs today.  She has been both continent (400 mls) and incontinent of urine. Upright in chair majority of day. She has declined going back to bed because she said \"it feels better on her legs to be up.\" She has been confused but followed commands appropriately. Refused breakfast and lunch. Expiratory wheezes and coarse crackles throughout right anterior/posterior lung fields.  Left lung diminished.  93-95% on RA.   VSS.     VAHE FELDER RN on 12/16/2018 at 5:15 PM    "

## 2018-12-17 NOTE — PLAN OF CARE
Adult Inpatient Plan of Care  Plan of Care Review  Patient up to chair this morning.  Denies any shortness of breath.  Sp02 >92% on room air.  Lung sounds continue to have crackles in bases bilaterally, pt has congested cough, unable to cough up anything.  Patient tolerated eating small amount at meal time.  Adequate output via periwick and incontinence.  Patient has denied any pain or discomfort.  Patient turned and repositioned every two hours or as needed.  No new skin breakdown this shift.   12/17/2018 1709 - Improving by Millicent Milan, RN

## 2018-12-17 NOTE — PROGRESS NOTES
12/17/18 1000   Quick Adds   Type of Visit Initial Occupational Therapy Evaluation   Living Environment   Lives With alone   Living Arrangements assisted living   Home Accessibility no concerns   Self-Care   Current Activity Tolerance poor   Mobility   Bed Mobility Bed mobility skill: Supine to sit   Bed Mobility Comments Moax a two for supine to sit. to EOB.    Bed Mobility Skill: Supine to Sit   Level of [place]: Supine/Sit maximum assist (25% patients effort)   Physical Assist/Nonphysical Assist: Supine/Sit 2 persons   Transfer Skill: Bed to Chair/Chair to Bed   Level of [place]: Bed to Chair maximum assist (25% patients effort)   Physical Assist/Nonphysical Assist: Bed to Chair 2 persons   Weight-Bearing Restrictions full weight-bearing   Transfer Skill: Sit to Stand   Level of [place]: Sit/Stand maximum assist (25% patients effort)   Physical Assist/Nonphysical Assist: Sit/Stand 2 persons   Transfer Skill: Sit to Stand full weight-bearing   Grooming   Level of [place]: Grooming maximum assist (25% patients effort)   Physical Assist/Nonphysical Assist: Grooming 1 person assist   General Therapy Interventions   Planned Therapy Interventions transfer training;bed mobility training;ADL retraining   Clinical Impression   Criteria for Skilled Therapeutic Interventions Met yes, treatment indicated   OT Diagnosis aspiration Pnuemonia   Influenced by the following impairments weakness, cognition, self cares   Assessment of Occupational Performance 1-3 Performance Deficits   Identified Performance Deficits dressing, G/H, transfers,    Clinical Decision Making (Complexity) Low complexity   Therapy Frequency daily   Predicted Duration of Therapy Intervention (days/wks) 1-3 days   Anticipated Discharge Disposition Transitional Care Facility   Risks and Benefits of Treatment have been explained. Yes   Patient, Family & other staff in agreement with plan of care Yes   Clinical Impression Comments Karey  was admitted with intractible vomiting and aspiration pneumonia from [facility]. She    Total Evaluation Time   Total Evaluation Time (Minutes) 15

## 2018-12-17 NOTE — PROGRESS NOTES
[facility]    Hospitalist Progress Note      Assessment & Plan   Karey Paulson is a 89 year old female who was admitted on 12/13/2018.     Principal Problem:    Acute respiratory failure with hypoxia (H)    Assessment: present on admission, secondary to aspiration and aspiration pneumonia. Improved. On room air. Aspiration related to norovirus infection and vomiting.     Plan: Resolved      Aspiration pneumonia (H)    Assessment: Improving. On room air. White blood cell count decreasing. No fevers.    Plan: Switch to augmentin from zosyn.     Severe gastroenteritis    Assessment: patient diagnosed with norovirus    Plan: no loose stools or vomiting now    Active Problems:    GERD    Assessment: chronic      CAD (coronary artery disease)      DVT Prophylaxis: Pneumatic Compression Devices  Code Status: DNR/DNI    Henrik Fall    Interval History   Feels well, does NOT want to go to skilled nursing facility.     -Data reviewed today: I reviewed all new labs and imaging results over the last 24 hours. I personally reviewed no images or EKG's today.    Physical Exam   Temp: 98.4  F (36.9  C) Temp src: Tympanic BP: 168/70 Pulse: 74 Heart Rate: 74 Resp: 20 SpO2: 91 % O2 Device: None (Room air)    Vitals:    12/15/18 0622 12/16/18 0527 12/17/18 0517   Weight: 62.5 kg (137 lb 12.6 oz) 63.8 kg (140 lb 10.5 oz) 65 kg (143 lb 4.8 oz)     Vital Signs with Ranges  Temp:  [98.4  F (36.9  C)-99.4  F (37.4  C)] 98.4  F (36.9  C)  Pulse:  [74] 74  Heart Rate:  [57-76] 74  Resp:  [18-20] 20  BP: (136-168)/(52-70) 168/70  SpO2:  [91 %-93 %] 91 %  I/O last 3 completed shifts:  In: 2263 [P.O.:445; I.V.:1818]  Out: 725 [Urine:725]    GENERAL: Comfortable, no apparent distress.  CARDIOVASCULAR: regular rate and rhythm, no murmur. No lower extremity edema   RESPIRATORY: Clear to auscultation bilaterally, no wheezes or crackles.  GI: non-tender, non-distended, normal bowel sounds.   SKIN: warm periphery, no  rashes      Medications     NaCl 125 mL/hr at 12/17/18 1025       amLODIPine  5 mg Oral Daily     brimonidine-timolol  1 drop Left Eye BID     brinzolamide-brimonidine  1 drop Right Eye TID     latanoprost  1 drop Right Eye At Bedtime     losartan  100 mg Oral Daily     pantoprazole (PROTONIX) IV  40 mg Intravenous Daily with breakfast     piperacillin-tazobactam  3.375 g Intravenous Q6H     prednisoLONE acetate  1 drop Both Eyes Daily       Data   Recent Labs   Lab 12/17/18  0410 12/16/18  1545 12/16/18  0524 12/15/18  0420  12/14/18  0920 12/13/18  1325   WBC 17.0*  --  19.8* 21.3*  --  19.1* 8.6   HGB 11.0*  --  11.1* 12.1  --  12.2 13.3   MCV 87  --  88 91  --  92 92     --  164 178  --  172 244     --  140 144  --  146* 137   POTASSIUM 3.3* 3.6 3.1* 3.4*  3.3*   < > 3.1* 3.5   CHLORIDE 109*  --  114* 118*  --  117* 109*   CO2 19*  --  19* 15*  --  20* 20*   BUN 19  --  21 27*  --  33* 30*   CR 0.43*  --  0.49* 0.67  --  0.98 0.69   ANIONGAP 11  --  7 11  --  9 8   KEILY 8.5*  --  8.6 8.9  --  8.2* 9.5   GLC 47*  --  74 85  --  118* 188*   ALBUMIN  --   --  2.7*  --   --  2.9* 3.7   PROTTOTAL  --   --  5.2*  --   --  5.5* 6.7   BILITOTAL  --   --  0.7  --   --  0.5 0.5   ALKPHOS  --   --  34  --   --  29* 44   ALT  --   --  14  --   --  15 16   AST  --   --  23  --   --  24 18   TROPI  --   --   --   --   --   --  <0.030    < > = values in this interval not displayed.

## 2018-12-17 NOTE — PLAN OF CARE
Occupational Therapy Discharge Summary    Reason for therapy discharge:    Discharged to home with home therapy.  No further expectations of functional progress.  Patient to return home for further assistance     Progress towards therapy goal(s). See goals on Care Plan in [facility] electronic health record for goal details.  Goals partially met.  Barriers to achieving goals:   limited tolerance for therapy and discharge from facility.    Therapy recommendation(s):    Continued therapy is recommended.  Rationale/Recommendations:  to increase safety and determine level of care.

## 2018-12-17 NOTE — PROGRESS NOTES
Pt lab glucose 48. Pt was rechecked by finger stick at 0630 it was 53. Pt was given orange juice and peanut butter toast. Will recheck blood glucose at 0700. Rhea Triana RN on 12/17/2018 at 6:41 AM

## 2018-12-17 NOTE — PLAN OF CARE
"Pt denies having pain. Coccyx with blanchable erythremia. Barrier cream applied and repositioned q2hrs. Lungs sounds coarse with crackles in right lobes. Purwick abreu used with 425mL output. /52   Pulse 74   Temp 98.5  F (36.9  C) (Tympanic)   Resp 20   Ht 1.676 m (5' 6\")   Wt 65 kg (143 lb 4.8 oz)   SpO2 92%   BMI 23.13 kg/m   room air. Eye drops are kept in pt room in the safe. Rhea Triana RN on 12/17/2018 at 5:58 AM       "

## 2018-12-17 NOTE — PLAN OF CARE
Discharge Planner PT   Patient plan for discharge: return to assisted living facility   Current status: requires assist with all mobility tasks  Barriers to return to prior living situation: fatigue  Recommendations for discharge: continued PT  Rationale for recommendations: to promote safe, functional transfer and bed mobilities       Entered by: Manny Carlos 12/17/2018 12:59 PM

## 2018-12-17 NOTE — PLAN OF CARE
Discharge Planner OT   Patient plan for discharge: Return to prior living environment with increase assist.   Current status: Transferred from bed to chair with A of two. Hair washed with cap. Max assist to comb. Confused today. insistant that she does not [facility].   Barriers to return to prior living situation: weakness, confusion,   Recommendations for discharge: [place] with assist.   Rationale for recommendations:  see above       Entered by: Nohemi Romo 12/17/2018 1:13 PM

## 2018-12-18 NOTE — PLAN OF CARE
"Pt denies having pain. Repositioning completed q2hrs. Barrier cream applied to coccyx with no new skin break down. Pt incont of diarrhea x3. Purewick used to prevent urine incontinence and skin breakdown. Lung sounds with coarse crackles that are worse on the right side. /72   Pulse 74   Temp 99.5  F (37.5  C) (Tympanic)   Resp 20   Ht 1.676 m (5' 6\")   Wt 65 kg (143 lb 4.8 oz)   SpO2 92%   BMI 23.13 kg/m   room air. Pt is worried about transportation home today. Reassurance was given that everything was arranged. Rhea Triana RN on 12/18/2018 at 3:49 AM      "

## 2018-12-18 NOTE — PROGRESS NOTES
Attempted to call [facility] twice to talk with the nurse.  Did talk to JUANI Borges yesterday and gave update and nurse to nurse.  Nothing has changed, no return phone call made back to us despite messages left.  See social work notes.  [facility] has been notified several times this morning about patient returning to facility.

## 2018-12-18 NOTE — PLAN OF CARE
Physical Therapy Discharge Summary    Reason for therapy discharge:    Discharged to home.    Progress towards therapy goal(s). See goals on Care Plan in [facility] electronic health record for goal details.  Goals partially met.  Barriers to achieving goals:   discharge from facility.    Therapy recommendation(s):  continued PT is recommended, however, patient does not wish to participate with any additional formal PT services

## 2018-12-18 NOTE — PROGRESS NOTES
:    Called [facility] and spoke with Taylor about discharge plans. Taylor stated that she would have Katerina Posadas LPN call when she is in to discuss discharge plans, as Katerina stated yesterday that she would need patient back to their facility at a specific time TBD.  will continue to follow.     CATHERINE Beavers on 12/18/2018 at 8:17 AM     Addendum:    Met with patient to discuss discharge plans. Patient again confirmed that she does not want home care at discharge, as she already receives exercises at [facility]. Patient would like to make sure [facility] picks up her wheelchair prior to picking her up. Patient also requested that her granddaughter is called with an update. She would like her granddaughter to visit her when she returns to [facility].     Called [facility] again. Katerina Posadas was still unavailable to discuss discharge plans. Staff at [facility] stated that would send her a message to call . Informed staff that [facility] would be scheduled to ensure that we have a ride back to [facility] for patient today.     Called Lucy at [facility] and scheduled discharge transportation for 1300. Care Cab to  patient's wheelchair at [facility].     Called [facility] for a third time this morning to discuss discharge plans. No answer. Left voicemail with discharge update. Requested call back ASAP if there are any issues with transportation time.     Called patient's granddaughterElba and left voicemail with update that patient is discharging at 1300 and that patient would like her to visit [facility] today.     Notified patient and nursing of discharge plans.     No further needs at this time.     CATHERINE Beavers on 12/18/2018 at 9:49 AM

## 2018-12-18 NOTE — PLAN OF CARE
Adult Inpatient Plan of Care  Plan of Care Review  Patient has met goals for discharge.  Tolerating regular diet, has had appetite and eats 50% of meals.  Patient has no difficulty swallowing.  Kept on aspiration precautions.  VSS and has remained on room air.  Patient up with assist x 1.  Patient denies any pain or discomfort.  Patient will discharged back to [facility].   12/18/2018 1243 - Adequate for Discharge by Millicent Milan RN

## 2018-12-18 NOTE — PROGRESS NOTES
NSG DISCHARGE NOTE    Patient discharged to [facility] at 1:15 PM via Care cab. Accompanied by other: Care  and staff. Discharge instructions reviewed with patient and  nurse Teri at [facility] yesterday, see previous nursing note., opportunity offered to ask questions. Prescriptions sent to patients preferred pharmacy. All belongings sent with patient.    Millicent Milan

## 2018-12-18 NOTE — PHARMACY - DISCHARGE MEDICATION RECONCILIATION
Pharmacy:  Discharge Counseling and Medication Reconciliation    Karey Paulson  [address]  formerly [facility] [address] [phone] (home)   89 year old female  PCP: Byron Huerta    Allergies: Atorvastatin; Ciprofloxacin; Erythromycin; Lisinopril; Simvastatin; and Sulfamethoxazole w/trimethoprim     Discussed home use of eye drops with HAMLET Ricks, she will ensure all are returned to patient at discharge.     Discharge Medication Reconciliation:    Minna Munguia RPH has reviewed the patient's discharge medication orders and has compared them to the inpatient medication administration record and to what the patient was taking prior to admission - any discrepancies have been resolved.    It has been determined that the patient has an adequate supply of medications available or which can be obtained from the patient's preferred pharmacy, which HE/SHE has confirmed as: [facility]     Thank you for the consult.    Minna Munguia RPH........December 18, 2018 9:37 AM

## 2018-12-18 NOTE — PROGRESS NOTES
Call made and message left at [facility].  Patient eye drops were left on counter in room.  Left message stating that if they call back we could have eye drops left at  for .  No returned phone call.

## 2018-12-18 NOTE — DISCHARGE SUMMARY
"[facility]    Discharge Summary  Hospitalist    Date of Admission:  12/13/2018  Date of Discharge:  12/18/2018  Discharging Provider: Henrik Fall  Date of Service (when I saw the patient): 12/18/18    Discharge Diagnoses   Principal Problem:    Aspiration pneumonia (H) (12/13/2018)  Active Problems:    GERD (12/31/2015)    CAD (coronary artery disease) (12/13/2018)    Acute respiratory failure with hypoxia (H) (12/14/2018)    Gastroenteritis due to norovirus (12/15/2018)      History of Present Illness   Karey Paulson is an 89 year old female who presented with severe gastroenteritis, vomited and developed aspiration pneumonia. Per the H&P:  \"Karey Paulson is a 89 year old female who presents with intractable vomiting and diarrhea.  She resides at [facility].  She presented by EMS after having a syncopal spell x2 and then developing nausea vomiting and diarrhea.  Upon arrival she was covered in vomitus and stool and continued to have episodes of both in the emergency department.  She was initially awake and alert, but over the course of her evaluation became less responsive.  A CT of the abdomen and pelvis was performed and during that procedure she had a large emesis and aspirated.  Upon return to the emergency department she was in respiratory distress and blood pressures were 100 systolic.  She was requiring 6-7 L of nasal cannula oxygen to maintain O2 sats of 90%.  Was unable to get any history from her at this time.\"    Hospital Course   Karey Paulson was admitted on 12/13/2018.  The following problems were addressed during her hospitalization:    Aspiration pneumonia  The patient was initially admitted to the ICU with significant respiratory distress.  I was very worried that she was not going to survive this infection.  She was treated with IV Zosyn and over the next few days improved markedly.  She was weaned of oxygen.  Dysphasia aspiration pneumonia but more related to her " severe gastroenteritis.  She will complete 7 additional days of Augmentin    Gastroenteritis  The patient was positive for norovirus.  She was in isolation and volume repleted.  Her GI symptoms had normalized by discharge.    Henrik Fall MD      Code Status   DNR / DNI       Primary Care Physician   Byron Huerta    Physical Exam   Temp: 98.8  F (37.1  C) Temp src: Tympanic BP: 168/80   Heart Rate: 81 Resp: 22 SpO2: 94 % O2 Device: None (Room air)    Vitals:    12/16/18 0527 12/17/18 0517 12/18/18 0611   Weight: 63.8 kg (140 lb 10.5 oz) 65 kg (143 lb 4.8 oz) 64.3 kg (141 lb 12.1 oz)     Vital Signs with Ranges  Temp:  [98.8  F (37.1  C)-99.5  F (37.5  C)] 98.8  F (37.1  C)  Heart Rate:  [68-81] 81  Resp:  [18-22] 22  BP: (151-168)/(72-80) 168/80  SpO2:  [91 %-94 %] 94 %  I/O last 3 completed shifts:  In: 2955 [I.V.:2955]  Out: 3275 [Urine:3275]    GENERAL: Comfortable, no apparent distress.  CARDIOVASCULAR: regular rate and rhythm, no murmur. No lower extremity edema   RESPIRATORY: Clear to auscultation bilaterally, no wheezes or crackles.  GI: non-tender, non-distended, normal bowel sounds.   SKIN: warm periphery, no rashes      Discharge Disposition   Discharged to assisted living  Condition at discharge: Stable    Consultations This Hospital Stay   SOCIAL WORK IP CONSULT  PHYSICAL THERAPY ADULT IP CONSULT  OCCUPATIONAL THERAPY ADULT IP CONSULT    Time Spent on this Encounter   I, Henrik Fall, personally saw the patient today and spent greater than 30 minutes discharging this patient.    Discharge Orders      Reason for your hospital stay    Aspiration pneumonia and norovirus     Follow-up and recommended labs and tests    Follow up with Dr. Kameron Huerta  on 12/28  at 1115 AM.     Activity    Your activity upon discharge: activity as tolerated     Discharge Instructions    Continue your exercises as previous     DNR/DNI     Diet    Follow this diet upon discharge: Orders Placed This Encounter       Regular Diet Adult       Discharge Medications   Current Discharge Medication List      START taking these medications    Details   amoxicillin-clavulanate (AUGMENTIN) 875-125 MG tablet Take 1 tablet by mouth 2 times daily for 7 days  Qty: 14 tablet, Refills: 0    Associated Diagnoses: Aspiration pneumonia due to vomit, unspecified laterality, unspecified part of lung (H)         CONTINUE these medications which have NOT CHANGED    Details   !! acetaminophen (TYLENOL) 325 MG tablet Take 650 mg by mouth every 4 hours as needed for mild pain      !! acetaminophen (TYLENOL) 500 MG tablet Take 500 mg by mouth 4 times daily      acetaZOLAMIDE (DIAMOX) 250 MG tablet Take 250 mg by mouth 2 times daily  Refills: 5      alum & mag hydroxide-simethicone (MYLANTA/MAALOX) 200-200-20 MG/5ML SUSP suspension Take 30 mLs by mouth 3 times daily as needed for indigestion      amLODIPine (NORVASC) 5 MG tablet Take 5 mg by mouth At Bedtime      ASPIRIN LOW DOSE 81 MG EC tablet TAKE 1 TABLET BY MOUTH EVERY EVENING  Qty: 90 tablet, Refills: 2    Associated Diagnoses: Hyperlipidemia; Essential hypertension, benign      brimonidine-timolol (COMBIGAN) 0.2-0.5 % ophthalmic solution Place 1 drop Into the left eye 2 times daily      brinzolamide-brimonidine (SIMBRINZA) 1-0.2 % ophthalmic suspension Place 1 drop into the right eye 3 times daily      latanoprost (XALATAN) 0.005 % ophthalmic solution Place 1 drop into the right eye At Bedtime      losartan (COZAAR) 100 MG tablet Take 1 tablet (100 mg) by mouth daily  Qty: 93 tablet, Refills: 3    Associated Diagnoses: Essential hypertension      prednisoLONE acetate (PRED FORTE) 1 % ophthalmic suspension Place 1 drop into both eyes daily       ranitidine (ZANTAC) 150 MG tablet Take 1 tablet (150 mg) by mouth 2 times daily  Qty: 180 tablet, Refills: 3    Associated Diagnoses: Gastroesophageal reflux disease without esophagitis      timolol (TIMOPTIC) 0.5 % ophthalmic solution PLACE 1 DROP IN THE  RIGHT EYE TWICE DAILY  Refills: 6      melatonin 3 MG tablet TAKE 1 TABLET BY MOUTH NIGHTLY AT BEDTIME  Qty: 100 tablet, Refills: 2    Associated Diagnoses: Primary insomnia      order for DME Equipment being ordered: Wheelchair manual  Qty: 1 each, Refills: 0    Associated Diagnoses: Weakness of both lower extremities; Chronic bilateral low back pain without sciatica      VITAMIN D3 1000 UNITS tablet TAKE 1 TABLET BY MOUTH ONCE DAILY (IN THE MORNING)  Refills: 0       !! - Potential duplicate medications found. Please discuss with provider.        Allergies   Allergies   Allergen Reactions     Atorvastatin Other (See Comments) and Nausea and Vomiting     Myalgia     Ciprofloxacin Other (See Comments)     Erythromycin      Lisinopril Cough     Simvastatin Other (See Comments)     Achey muscles.      Sulfamethoxazole W/Trimethoprim Unknown     Nausea and shaking     Data   Most Recent 3 CBC's:  Recent Labs   Lab Test 12/17/18  0410 12/16/18  0524 12/15/18  0420   WBC 17.0* 19.8* 21.3*   HGB 11.0* 11.1* 12.1   MCV 87 88 91    164 178      Most Recent 3 BMP's:  Recent Labs   Lab Test 12/18/18  0530 12/17/18  1250 12/17/18  0410  12/16/18  0524 12/15/18  0420   NA  --   --  139  --  140 144   POTASSIUM 3.0* 3.9 3.3*   < > 3.1* 3.4*  3.3*   CHLORIDE  --   --  109*  --  114* 118*   CO2  --   --  19*  --  19* 15*   BUN  --   --  19  --  21 27*   CR  --   --  0.43*  --  0.49* 0.67   ANIONGAP  --   --  11  --  7 11   KEILY  --   --  8.5*  --  8.6 8.9   GLC  --   --  47*  --  74 85    < > = values in this interval not displayed.     Most Recent 2 LFT's:  Recent Labs   Lab Test 12/16/18  0524 12/14/18  0920   AST 23 24   ALT 14 15   ALKPHOS 34 29*   BILITOTAL 0.7 0.5     Most Recent INR's and Anticoagulation Dosing History:  Anticoagulation Dose History     Recent Dosing and Labs Latest Ref Rng & Units 11/5/2018    INR 0 - 1.3 0.98        Most Recent 3 Troponin's:  Recent Labs   Lab Test 12/13/18  1325 11/05/18  1054  10/07/18  1132   TROPI <0.030 <0.030 <0.030     Most Recent Cholesterol Panel:  Recent Labs   Lab Test 03/26/14  1134   *   HDL 63   TRIG 110     Most Recent 6 Bacteria Isolates From Any Culture (See EPIC Reports for Culture Details):  Recent Labs   Lab Test 12/13/18  1915 12/13/18  1542 11/05/18  1210 11/05/18  1055 03/13/18  1137   CULT No MRSA isolated No growth after 5 days  No growth after 5 days >100,000 colonies/mL  Citrobacter braakii  * No growth after 6 days  No growth after 6 days >100,000 colonies/mL  Citrobacter braakii  *     Most Recent TSH, T4 and A1c Labs:No lab results found.  Results for orders placed or performed during the hospital encounter of 12/13/18   CT Abdomen Pelvis w Contrast    Narrative    PROCEDURE:  CT ABDOMEN PELVIS W CONTRAST    HISTORY: Abd pain, diverticulitis suspected    TECHNIQUE:  Helical CT of the abdomen and pelvis was performed  following injection of intravenous contrast.  Ingested oral contrast  partially opacifies the bowel.      COMPARISON:  10/20/2017, 11/13/2017    MEDS/CONTRAST: 100 ml Omnipaque 350    FINDINGS:      Limited images through the lung bases demonstrate asymmetric  groundglass opacity throughout the right lung base. No effusion is  seen.     3 separate low density lesions are reidentified in the pancreas. A  cyst within the distal body/proximal tail is smaller, measuring 9 mm,  previously 15 mm. Another low-density pancreatic lesions are  subcentimeter and unchanged in caliber. Pancreatic duct remains within  normal limits in caliber.    There is intrahepatic biliary ductal dilatation, chronic. The  gallbladder is surgically absent. Symmetric nephrograms are present  without hydronephrosis. No adrenal mass is present.    The bowel is normal in caliber. The colon is almost entirely  collapsed, with a small amount of fluid in the rectum. No focal  pericolonic inflammatory changes are seen to suggest acute  diverticulitis.    A bladder  diverticulum is again present. No free air or adenopathy is  identified. No suspicious osseous lesion is seen. The bones are  osteoporotic.      Impression    IMPRESSION:      Groundglass opacity throughout the right lung base is new and  asymmetric, suggesting acute pneumonitis, potentially due to infection  or aspiration. Recommend follow-up chest radiographs.    No evidence of diverticulitis as clinically questioned. The colon is  almost entirely collapsed, with a small amount of fluid in the rectum,  suggesting possible proctocolitis.    Multiple chronic low-density pancreatic lesions, with the largest  appearing smaller in size.    Chronic intrahepatic biliary ductal dilatation.    JOCELIN CAUSEY MD   XR Chest Port 1 View    Narrative    PROCEDURE: XR CHEST PORT 1 VW 12/13/2018 3:31 PM    HISTORY: sob    COMPARISONS: 11/5/2018.    TECHNIQUE: Single view.    FINDINGS: There is extensive focal airspace disease throughout the  right lung, more confluent in the right lower lobe. This is consistent  with relatively diffuse right-sided pneumonia. Left lung is clear.  Heart is not enlarged.    Degenerative changes seen in both shoulders and there is chronic  rotator cuff disease bilaterally.         Impression    IMPRESSION: New relatively diffuse infiltrate in the right lung, most  consistent with pneumonia.    BENJIE THACKER MD

## 2018-12-20 NOTE — ED AVS SNAPSHOT
[facility]  [address]  [place] [address]  Phone:  [phone]  Fax:  [phone]                                    Karey Paulson   MRN: [id number]    Department:  [facility]   Date of Visit:  12/20/2018           After Visit Summary Signature Page    I have received my discharge instructions, and my questions have been answered. I have discussed any challenges I see with this plan with the nurse or doctor.    ..........................................................................................................................................  Patient/Patient Representative Signature      ..........................................................................................................................................  Patient Representative Print Name and Relationship to Patient    ..................................................               ................................................  Date                                   Time    ..........................................................................................................................................  Reviewed by Signature/Title    ...................................................              ..............................................  Date                                               Time          22EPIC Rev 08/18

## 2018-12-20 NOTE — ED PROVIDER NOTES
"  History     Chief Complaint   Patient presents with     Altered Mental Status     HPI  Karey Paulson is a 89 year old female who comes in for episode of upper abdominal pain, SOB and decreased LOC.  Patient can't give a lot of details.  Said her stomach \"turned over\" and points to upper abdomen.  It is now feeling pretty good.  denies any diarrhea or constipation.  No dysuria.    Problem List:    Patient Active Problem List    Diagnosis Date Noted     Gastroenteritis due to norovirus 12/15/2018     Priority: Medium     Acute respiratory failure with hypoxia (H) 12/14/2018     Priority: Medium     Aspiration pneumonia (H) 12/13/2018     Priority: Medium     Aspiration into airway 12/13/2018     Priority: Medium     CAD (coronary artery disease) 12/13/2018     Priority: Medium     Sinus pause 12/04/2018     Priority: Medium     History of myocardial infarction 12/14/10 12/04/2018     Priority: Medium     Essential hypertension 12/04/2018     Priority: Medium     Hx of cardiac cath 12/14/10 with x3 stents to the RCA 12/04/2018     Priority: Medium     First degree heart block 12/04/2018     Priority: Medium     Generalized muscle weakness 12/04/2018     Priority: Medium     Bradycardia 11/08/2018     Priority: Medium     Syncope, unspecified syncope type 10/12/2018     Priority: Medium     Primary insomnia 04/11/2018     Priority: Medium     Weakness of both lower extremities 03/27/2018     Priority: Medium     S/P laparoscopic colectomy 11/14/2017     Priority: Medium     Diverticulosis of large intestine 10/22/2017     Priority: Medium     Overview:   S/p fle sig, 10/22/2017       Stricture of sigmoid colon (H) 10/22/2017     Priority: Medium     Overview:   S/p flex sig 10/22/2017       [place]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     Priority: Medium     Overview:        Recurrent UTI 10/21/2017     Priority: Medium     Overview:   2 in 2017, 10/13/2017 and 6/28/2017       Pancreatic cyst 10/20/2017     Priority: Medium     " Overview:   Cystic nodules, multiple; s/p CT Abd Pelv       GERD 12/31/2015     Priority: Medium     Backache 02/24/2013     Priority: Medium     Actinic keratosis 04/18/2012     Priority: Medium     Osteoarthrosis 11/08/2011     Priority: Medium     Mixed hyperlipidemia 01/29/2009     Priority: Medium     Overview:   Overview:   IMO Update 10/11       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     Priority: Medium        Past Medical History:    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      Acute myocardial infarction (H)      Cyst of pancreas      Diaphragmatic hernia without obstruction or gangrene      Diverticulosis of large intestine without perforation or abscess without bleeding      Edema      Epigastric pain      Essential (primary) hypertension      Gastro-esophageal reflux disease without esophagitis      Osteoarthritis      Other intestinal obstruction unspecified as to partial versus complete obstruction (CODE)      Personal history of malignant neoplasm of breast      Personal history of other medical treatment (CODE)      Postmenopausal atrophic vaginitis      Sepsis (H)      Urinary tract infection      Urinary tract infection        Past Surgical History:    Past Surgical History:   Procedure Laterality Date     ANGIOPLASTY      12-,Angioplasty with 3 bare-metal stents RCA     APPENDECTOMY OPEN      1948,Appendectomy     CHOLECYSTECTOMY      1995,[facility]     EXTRACAPSULAR CATARACT EXTRATION WITH INTRAOCULAR LENS IMPLANT      12/2016,bilat     HYSTERECTOMY TOTAL ABDOMINAL      1967,[facility], ovaries remaining     MASTECTOMY SIMPLE      1993,[facility]     OTHER SURGICAL HISTORY      10/24/2017,[id number],OTHER,Ellen Arambula and Jairo       Family History:    Family History   Problem Relation Age of Onset     Breast Cancer No family hx of         Cancer-breast       Social History:  Marital Status:   [5]  Social History  "    Tobacco Use     Smoking status: Never Smoker     Smokeless tobacco: Never Used   Substance Use Topics     Alcohol use: No     Alcohol/week: 0.0 oz     Drug use: No     Comment: Drug use: No        Medications:      acetaminophen (TYLENOL) 325 MG tablet   acetaminophen (TYLENOL) 500 MG tablet   acetaZOLAMIDE (DIAMOX) 250 MG tablet   alum & mag hydroxide-simethicone (MYLANTA/MAALOX) 200-200-20 MG/5ML SUSP suspension   amLODIPine (NORVASC) 5 MG tablet   amoxicillin-clavulanate (AUGMENTIN) 875-125 MG tablet   ASPIRIN LOW DOSE 81 MG EC tablet   brimonidine-timolol (COMBIGAN) 0.2-0.5 % ophthalmic solution   brinzolamide-brimonidine (SIMBRINZA) 1-0.2 % ophthalmic suspension   latanoprost (XALATAN) 0.005 % ophthalmic solution   losartan (COZAAR) 100 MG tablet   melatonin 3 MG tablet   order for DME   prednisoLONE acetate (PRED FORTE) 1 % ophthalmic suspension   ranitidine (ZANTAC) 150 MG tablet   timolol (TIMOPTIC) 0.5 % ophthalmic solution   VITAMIN D3 1000 UNITS tablet         Review of Systems   Constitutional: Negative for chills and fever.   HENT: Negative for congestion.    Eyes: Negative for visual disturbance.   Respiratory: Positive for shortness of breath. Negative for chest tightness.    Cardiovascular: Negative for chest pain.   Gastrointestinal: Positive for abdominal pain.   Genitourinary: Negative for hematuria.   Musculoskeletal: Negative for back pain.   Skin: Negative for rash and wound.   Neurological: Negative for syncope.   Hematological: Negative for adenopathy. Does not bruise/bleed easily.   Psychiatric/Behavioral: Negative for confusion.       Physical Exam   BP: 154/69  Pulse: 81  Temp: 98.6  F (37  C)  Resp: 18  Height: 167.6 cm (5' 6\")  Weight: 64 kg (141 lb)  SpO2: 94 %      Physical Exam   Constitutional: She appears well-developed and well-nourished. No distress.   HENT:   Head: Normocephalic and atraumatic.   Eyes: Conjunctivae are normal. No scleral icterus.   Neck: Neck supple. "   Cardiovascular: Normal rate and regular rhythm.   Pulmonary/Chest: Effort normal. She has rales in the right lower field.   Abdominal: Soft. There is no tenderness.   Musculoskeletal: She exhibits no deformity.   Lymphadenopathy:     She has no cervical adenopathy.   Neurological: She is alert.   Skin: Skin is warm and dry. No rash noted. She is not diaphoretic.   Psychiatric: She has a normal mood and affect.       ED Course     ED Course as of Dec 20 2126   Thu Dec 20, 2018   1527 Chloride: (!) 108     Procedures           Results for orders placed or performed during the hospital encounter of 12/20/18 (from the past 24 hour(s))   CBC with platelets differential   Result Value Ref Range    WBC 8.8 4.0 - 11.0 10e9/L    RBC Count 4.46 3.8 - 5.2 10e12/L    Hemoglobin 12.8 11.7 - 15.7 g/dL    Hematocrit 38.8 35.0 - 47.0 %    MCV 87 78 - 100 fl    MCH 28.7 26.5 - 33.0 pg    MCHC 33.0 31.5 - 36.5 g/dL    RDW 14.5 10.0 - 15.0 %    Platelet Count 290 150 - 450 10e9/L    Diff Method Automated Method     % Neutrophils 78.3 %    % Lymphocytes 11.6 %    % Monocytes 7.5 %    % Eosinophils 1.4 %    % Basophils 0.6 %    % Immature Granulocytes 0.6 %    Absolute Neutrophil 6.9 1.6 - 8.3 10e9/L    Absolute Lymphocytes 1.0 0.8 - 5.3 10e9/L    Absolute Monocytes 0.7 0.0 - 1.3 10e9/L    Absolute Eosinophils 0.1 0.0 - 0.7 10e9/L    Absolute Basophils 0.1 0.0 - 0.2 10e9/L    Abs Immature Granulocytes 0.1 0 - 0.4 10e9/L   Comprehensive metabolic panel   Result Value Ref Range    Sodium 138 134 - 144 mmol/L    Potassium 2.9 (L) 3.5 - 5.1 mmol/L    Chloride 108 (H) 98 - 107 mmol/L    Carbon Dioxide 21 21 - 31 mmol/L    Anion Gap 9 3 - 14 mmol/L    Glucose 143 (H) 70 - 105 mg/dL    Urea Nitrogen 14 7 - 25 mg/dL    Creatinine 0.45 (L) 0.60 - 1.20 mg/dL    GFR Estimate >90 >60 mL/min/[1.73_m2]    GFR Estimate If Black >90 >60 mL/min/[1.73_m2]    Calcium 9.2 8.6 - 10.3 mg/dL    Bilirubin Total 0.6 0.3 - 1.0 mg/dL    Albumin 3.1 (L) 3.5 -  5.7 g/dL    Protein Total 6.3 (L) 6.4 - 8.9 g/dL    Alkaline Phosphatase 44 34 - 104 U/L    ALT 19 7 - 52 U/L    AST 21 13 - 39 U/L   XR Chest 2 Views    Narrative    PROCEDURE:  XR CHEST 2 VW    HISTORY:  sob,decreased LOC.     COMPARISON:  12/13/2018    FINDINGS:   The cardiac silhouette is normal in size. The pulmonary vasculature is  normal.  There are small bilateral pleural effusions. There has been  marked interval improvement in the right lung infiltrates. No  pneumothorax.      Impression    IMPRESSION:  Small bilateral pleural effusions. Marked improvement in  right lung infiltrates.      NEPTALI GORDON MD   *UA reflex to Microscopic   Result Value Ref Range    Color Urine Yellow     Appearance Urine Clear     Glucose Urine Negative NEG^Negative mg/dL    Bilirubin Urine Negative NEG^Negative    Ketones Urine Trace (A) NEG^Negative mg/dL    Specific Gravity Urine 1.010 1.000 - 1.030    Blood Urine Negative NEG^Negative    pH Urine 7.0 5.0 - 9.0 pH    Protein Albumin Urine Negative NEG^Negative mg/dL    Urobilinogen Urine 0.2 0.2 - 1.0 EU/dL    Nitrite Urine Negative NEG^Negative    Leukocyte Esterase Urine Negative NEG^Negative    Source Midstream Urine    Potassium   Result Value Ref Range    Potassium 3.3 (L) 3.5 - 5.1 mmol/L       Medications   potassium chloride ER (K-DUR/KLOR-CON M) CR tablet 40 mEq (40 mEq Oral Given 12/20/18 1412)       Assessments & Plan (with Medical Decision Making)     I have reviewed the nursing notes.    I have reviewed the findings, diagnosis, plan and need for follow up with the patient.  Patient's labs and x-ray are for the most part reassuring.  Her pneumonia seems better than last time we checked it.  No sign of UTI.  She is however quite hypokalemic.  She is given some oral potassium earlier in her stay.  I will get her a second dose here this evening.    In reviewing her recent hospitalization, it was the opinion of Dr. Fall that she would do better in a nursing home.   Family wished to try to keep her in her current assisted living as long as they could.  They have been in contact with the staff at her assisted living who told her that as soon as they felt it was too much for them to take care of they would let them know.  Today she seems much worse.  She is a heavy 2 person assist.  The staff at the assisted living was contacted and they do feel that she is getting to be a little too, acute for them to take care of there.  We have spoken with her power of , her granddaughter who is in agreement with looking for alternate placement.  We are pursuing placement at [facility] at this time.    1912 is currently change of shift, [facility] has not accepted her at this point and they are still looking at it.  Care patient will be turned over to Dr. Gill at this time.    9:16 PM she is accepted to [facility] and will shortly have transport.  Potassium is improved and avoid further supplementation pending anticipated improved diet.  Discussed CODE STATUS with patient and she reports that if her heart would stop or she would stop breathing and it is her time to go she would just rather be allowed to die.  Mohan Gill        Medication List      There are no discharge medications for this visit.         Final diagnoses:   Generalized muscle weakness   Impaired mobility and ADLs       12/20/2018   [facility]     Milad Wilcox MD  12/20/18 1912       Mohan Gill MD  12/20/18 2126

## 2018-12-20 NOTE — PROGRESS NOTES
":    Notified that patient is in the Emergency Department.  She was recently in the hospital and discharged home to [facility].  It was stated that they are recommending SNF as patient is an assist of two.     Met with patient in the Emergency Department to discuss.  Patient would like her granddaughter Elba contacted as she \"makes decisions\" for her.  Inquired if patient would be willing to consider short term rehab due to her weakness.  She wants to talk with her granddaughter before making any decisions.  Discussed local options and patient stated that she does not want to go to [facility].  She was offered [facility].    Attempted to reach patient's granddaughter Elba.  A message was left on her voicemail.    Attempted to reach nursing staff at [facility].  Left a voicemail requesting a call back.    Contacted patient's granddaughter Ana as Elba is unavailable.  Ana stated she works until 5:30-6.  She authorized a referral to be sent to [facility] so they can start to review for placement.     Faxed referral to [facility].  Notified Nataliia at [facility] of new referral.  They will review to see if they can accept patient.     Awaiting return call from patient's granddaughter Loreta to determine if she supports placement at [facility] and acceptance from [facility].    Requested House Supervisor complete PAS screen if patient discharges to SNF.    Updated RN     ELLE Arauz on 12/20/2018 at 4:54 PM    "

## 2018-12-20 NOTE — ED TRIAGE NOTES
Pt arrives from [facility] with c/o altered mental status and weakness. Pt was recently admitted for pneumonia and UTI.    Evelina Cunningham RN on 12/20/2018 at 12:01 PM

## 2018-12-20 NOTE — ED AVS SNAPSHOT
Karey Paulson #[id number] (CSN:[id number]) (89 year old F) (Adm: 18)    [id number]               [facility]: [phone]            Patient Demographics     Patient Name  Khurram Karey JENNINGS Sex  Female          Age  10/17/1924 (89 year old) SSN  [id number] Address  [address]  [facility] [address] Phone  [phone] (Home)  [phone] (Mobile) *Preferred*      PCP and Center    Primary Care Provider  Phone Center     Byron Huerta [phone] [address], [facility] [address]        Emergency Contact(s)     Name Relation Home Work Mobile    Elba Jones Grandchild [phone]    KhurramAna Grandchild [phone]      Samantha Paulson Daughter [phone]        Admission Information     Attending Provider Admitting Provider Admission Type Admission Date/Time    Milad Wilcox MD  Emergency 18  1200    Discharge Date Hospital Service Auth/Cert Status Service Area     Emergency Medicine [facility]    Unit Room/Bed Admission Status        EMERGENCY DEPT ED05/ED05 Admission (Confirmed)       Admission     Complaint    None      Hospital Account     Name Acct ID Class Status Primary Coverage    Karey Paulson [id number] Emergency Open MEDICARE - MEDICARE            Guarantor Account (for Hospital Account #[id number])     Name Relation to Pt Service Area Active? Acct Type    Karey Paulson Self FCS Yes Personal/Family    Address Phone          [address]  [place] [address] [phone](H)              Coverage Information (for Hospital Account #[id number])     1. MEDICARE/MEDICARE     F/O Payor/Plan Precert #    MEDICARE/MEDICARE     Subscriber Subscriber #    Karey Paulson [id number]    Address Phone    [employer] CLAIM[employer]  [address]  [place] [address] [phone]          2. BCBS/BCBS FEDERAL EMPLOYEE PROGRAM     F/O Payor/Plan Precert #    BCBS/BCBS  "[employer] EM[employer]EE PROGRAM     Subscriber Subscriber #    Karey Paulson [id number]    Address Phone    [address] [address]  [place] [address] [phone]                                                      INTERAGENCY TRANSFER FORM - PHYSICIAN ORDERS   12/20/2018                      [facility]: [phone]             Attending Provider:  Milad Wilcox MD    Allergies:  Atorvastatin, Ciprofloxacin, Erythromycin, Lisinopril, Simvastatin, Sulfamethoxazole W/trimethoprim    Infection:  None   Service:  EMERGENCY ME    Ht:  1.676 m (5' 6\")   Wt:  64 kg (141 lb)   Admission Wt:  64 kg (141 lb)    BMI:  22.76 kg/m 2   BSA:  1.73 m 2            ED Clinical Impression     Diagnosis Description Comment Added By Time Added    Generalized muscle weakness [M62.81] Generalized muscle weakness [M62.81]  Milad Wilcox MD 12/20/2018  6:36 PM    Impaired mobility and ADLs [Z74.09] Impaired mobility and ADLs [Z74.09]  Mohan Gill MD 12/20/2018  9:17 PM      Hospital Problem List as of 12/20/2018    None      Non-hospital Problem List as of 12/20/2018             Priority Class Noted    Borderline glaucoma with ocular hypertension Medium  6/26/2007    Mixed hyperlipidemia Medium  1/29/2009    Osteoarthrosis Medium  11/8/2011    Actinic keratosis Medium  4/18/2012    Backache Medium  2/24/2013    GERD Medium  12/31/2015    Pancreatic cyst Medium  10/20/2017    [place]-vesical fistula Medium  10/21/2017    Recurrent UTI Medium  10/21/2017    Diverticulosis of large intestine Medium  10/22/2017    Stricture of sigmoid colon (H) Medium  10/22/2017    S/P laparoscopic colectomy Medium  11/14/2017    Weakness of both lower extremities Medium  3/27/2018    Primary insomnia Medium  4/11/2018    Syncope, unspecified syncope type Medium  10/12/2018    Bradycardia Medium  11/8/2018    Sinus pause Medium  12/4/2018    History of myocardial infarction 12/14/10 Medium  12/4/2018    Essential hypertension Medium  " 12/4/2018    Hx of cardiac cath 12/14/10 with x3 stents to the RCA Medium  12/4/2018    First degree heart block Medium  12/4/2018    Generalized muscle weakness Medium  12/4/2018    Aspiration pneumonia (H) Medium  12/13/2018    Aspiration into airway Medium  12/13/2018    CAD (coronary artery disease) Medium  12/13/2018    Acute respiratory failure with hypoxia (H) Medium  12/14/2018    Gastroenteritis due to norovirus Medium  12/15/2018      Code Status History     Date Active Date Inactive Code Status Order ID Comments User Context    12/18/2018  9:05 AM 12/20/2018 12:00 PM DNR/DNI [id number]  Henrik Fall MD Outpatient    12/13/2018  4:49 PM 12/18/2018  9:05 AM DNR/DNI [id number]  Henrik Fall MD Inpatient      Current Code Status     Date Active Code Status Order ID Comments User Context       Prior         Medication Review      UNREVIEWED medications. Ask your doctor about these medications       Dose / Directions Comments   * acetaminophen 500 MG tablet  Commonly known as:  TYLENOL      Dose:  500 mg  Take 500 mg by mouth 4 times daily  Refills:  0      * acetaminophen 325 MG tablet  Commonly known as:  TYLENOL      Dose:  650 mg  Take 650 mg by mouth every 4 hours as needed for mild pain  Refills:  0      acetaZOLAMIDE 250 MG tablet  Commonly known as:  DIAMOX      Dose:  250 mg  Take 250 mg by mouth 2 times daily  Refills:  5      alum & mag hydroxide-simethicone 200-200-20 MG/5ML Susp suspension  Commonly known as:  MYLANTA/MAALOX      Dose:  30 mL  Take 30 mLs by mouth 3 times daily as needed for indigestion  Refills:  0      amLODIPine 5 MG tablet  Commonly known as:  NORVASC      Dose:  5 mg  Take 5 mg by mouth At Bedtime  Refills:  0      amoxicillin-clavulanate 875-125 MG tablet  Commonly known as:  AUGMENTIN  Used for:  Aspiration pneumonia due to vomit, unspecified laterality, unspecified part of lung (H)      Dose:  1 tablet  Take 1 tablet by mouth 2 times daily for 7 days  Quantity:   14 tablet  Refills:  0      ASPIRIN LOW DOSE 81 MG EC tablet  Used for:  Hyperlipidemia, Essential hypertension, benign  Generic drug:  aspirin      TAKE 1 TABLET BY MOUTH EVERY EVENING  Quantity:  90 tablet  Refills:  2      brimonidine-timolol 0.2-0.5 % ophthalmic solution  Commonly known as:  COMBIGAN      Dose:  1 drop  Place 1 drop Into the left eye 2 times daily  Refills:  0      brinzolamide-brimonidine 1-0.2 % ophthalmic suspension  Commonly known as:  SIMBRINZA      Dose:  1 drop  Place 1 drop into the right eye 3 times daily  Refills:  0      latanoprost 0.005 % ophthalmic solution  Commonly known as:  XALATAN      Dose:  1 drop  Place 1 drop into the right eye At Bedtime  Refills:  0      losartan 100 MG tablet  Commonly known as:  COZAAR  Used for:  Essential hypertension      Dose:  100 mg  Take 1 tablet (100 mg) by mouth daily  Quantity:  93 tablet  Refills:  3      melatonin 3 MG tablet  Used for:  Primary insomnia      TAKE 1 TABLET BY MOUTH NIGHTLY AT BEDTIME  Quantity:  100 tablet  Refills:  2      prednisoLONE acetate 1 % ophthalmic suspension  Commonly known as:  PRED FORTE      Dose:  1 drop  Place 1 drop into both eyes daily  Refills:  0      ranitidine 150 MG tablet  Commonly known as:  ZANTAC  Used for:  Gastroesophageal reflux disease without esophagitis      Dose:  150 mg  Take 1 tablet (150 mg) by mouth 2 times daily  Quantity:  180 tablet  Refills:  3      timolol maleate 0.5 % ophthalmic solution  Commonly known as:  TIMOPTIC      PLACE 1 DROP IN THE RIGHT EYE TWICE DAILY  Refills:  6      vitamin D3 1000 units (25 mcg) tablet  Commonly known as:  CHOLECALCIFEROL      TAKE 1 TABLET BY MOUTH ONCE DAILY (IN THE MORNING)  Refills:  0          * This list has 2 medication(s) that are the same as other medications prescribed for you. Read the directions carefully, and ask your doctor or other care provider to review them with you.            CONTINUE these medications which have NOT CHANGED   "     Dose / Directions Comments   order for DME  Used for:  Weakness of both lower extremities, Chronic bilateral low back pain without sciatica      Equipment being ordered: Wheelchair manual  Quantity:  1 each  Refills:  0               After Care     Activity      Activity Level: up as tolerated with assistance    Diet      Diet order: Regular  Fluid restriction? NA  Texture: regular  Thicken liquids? No    Fall precautions      General info for SNF      Admitting Provider(s): Mohan Gill MD    Admit to Facility: [facility]  Length of Stay Estimate: Long Term Care  Condition: Declining  Level of care:skilled   Rehabilitation Potential: Fair  Admission H&P completed: Yes, this ED visit  Recent Chemotherapy: No  Use Nursing Home Standing Orders: Yes    For questions about these admission orders, please contact:  [facility]  [address] [facility] [address]    Mantoux instructions (Administer two-step Mantoux unless history of TB or positive PPD)      Give two-step Mantoux (PPD) Per Facility Policy: Yes     Mantoux result:  No results found for: PPDREDNESS, PPDINDURATIO    If history of positive PPD and/or Mantoux is contraindicated, obtain (per facility protocol) one of the following tests and notify provider only if there are abnormal results:     1. Past or current chest X-ray (CXR) for \"active TB negative\" status. The CXR needs to have been done within three months of the patient's admission date. Obtain a copy of the CXR report to document the patient's status. If no such report available, obtain a CXR within 72 hours of admission.    2. QuantiFERON-TB Gold (QFT-G) blood test      Your next 10 appointments already scheduled    Dec 28, 2018 11:15 AM CST  Office Visit with Byron Huerta MD  [facility] [address] [facility] [address] [phone]   Bring a current list of meds and any " "records pertaining to this visit. For Physicals, please bring immunization records and any forms needing to be filled out. Please arrive 10 minutes early to complete paperwork.      Statement of Approval (From admission, onward)    Ordered          12/20/18 3843  I have reviewed and agree with all the recommendations and orders detailed in this document.     Approved and electronically signed by:  Mohan Gill MD                                                 INTERAGENCY TRANSFER FORM - NURSING   12/20/2018                      [facility]: [phone]             Attending Provider:  Milad Wilcox MD    Allergies:  Atorvastatin, Ciprofloxacin, Erythromycin, Lisinopril, Simvastatin, Sulfamethoxazole W/trimethoprim    Infection:  None   Service:  EMERGENCY ME    Ht:  1.676 m (5' 6\")   Wt:  64 kg (141 lb)   Admission Wt:  64 kg (141 lb)    BMI:  22.76 kg/m 2   BSA:  1.73 m 2            Advance Directives        Scanned docmt in ACP Activity?            Yes, scanned ACP docmt          Immunizations     Name Date      DTaP, Unspecified 04/27/06     FLU 6-35 months 10/13/16     FLU 6-35 months 09/25/14     Flu, Unspecified 09/25/14     Flu, Unspecified 10/10/13     Flu, Unspecified 10/07/10     Flu, Unspecified 01/15/10     Flu, Unspecified 10/16/09     Flu, Unspecified 11/04/08     Flu, Unspecified 10/05/07     Flu, Unspecified 11/16/06     Flu, Unspecified 11/10/05     Influenza (H1N1) 01/15/10     Influenza (High Dose) 3 valent vaccine 11/17/18     Influenza (High Dose) 3 valent vaccine 10/22/17     Influenza (IIV3) PF 10/10/13     Influenza (IIV3) PF 10/07/10     Influenza (IIV3) PF 11/04/08     Influenza (IIV3) PF 10/05/07     Influenza (IIV3) PF 11/16/06     Influenza (IIV3) PF 11/10/05     Influenza Vaccine IM 3yrs+ 4 Valent IIV4 10/06/15     Influenza, Whole Virus 10/06/15     Pneumo Conj 13-V (2010&after) 07/21/16     Pneumococcal 23 valent 04/27/06     Pneumococcal 23 valent " "10/14/94     Td (Adult), Adsorbed 04/27/06     Tdap (Adult) Unspecified Formulation 04/27/06       ASSESSMENT     Discharge Profile Flowsheet     DISCHARGE NEEDS ASSESSMENT    GI WDL  WDL pt denies pain now, pt reports pain earlier today 12/20/18 1321    Equipment Currently Used at Home  wheelchair, manual  12/16/18 1150   COMMUNICATION ASSESSMENT     GASTROINTESTINAL (ADULT,PEDIATRIC,OB)    Patient's communication style  spoken language (English or Bilingual)  12/13/18 1321            Vitals     Vital Signs Flowsheet     VITAL SIGNS    0-10 Pain Scale  0 12/20/18 2015    Temp  98.6  F (37  C) 12/20/18 1203   RYANNE COMA SCALE     Temp src  Tympanic 12/20/18 1203   Best Eye Response  4-->(E4) spontaneous 12/20/18 1246    Resp  18 12/20/18 1203   Best Motor Response  6-->(M6) obeys commands 12/20/18 1246    Pulse  95 12/20/18 1408   Best Verbal Response  4-->(V4) confused 12/20/18 1246    Pulse/Heart Rate Source  Monitor 12/20/18 1203   [place] Coma Scale Score  14 12/20/18 1246    BP  143/131  (Abnormal)  12/20/18 1408   HEIGHT AND WEIGHT     OXYGEN THERAPY    Height  1.676 m (5' 6\") 12/20/18 1203    SpO2  96 % 12/20/18 1455   Weight  64 kg (141 lb) 12/20/18 1203    O2 Device  None (Room air) 12/20/18 1203   BSA (Calculated - sq m)  1.73 12/20/18 1203    PAIN/COMFORT    BMI (Calculated)  22.81 12/20/18 1203            Patient Lines/Drains/Airways Status    Active LINES/DRAINS/AIRWAYS     Name: Placement date: Placement time: Site: Days: Last dressing change:    Pressure Injury 12/13/18 Posterior;Medial Coccyx Stage 1  12/13/18   2100   7             Patient Lines/Drains/Airways Status    Active PICC/CVC     None            Intake/Output Detail Report     None      Case Management/Discharge Planning     Case Management/Discharge Planning Flowsheet     FINAL RESOURCES    Feels Like Hurting Others  no 12/20/18 1204    Equipment Currently Used at Home  wheelchair, manual  12/16/18 1150   RETIRE:ABUSE RISK SCREEN     " VIOLENCE RISK    OBSERVATION: Is there reason to believe there has been maltreatment of a vulnerable adult (ie. Physical/Sexual/Emotional abuse, self neglect, lack of adequate food, shelter, medical care, or financial exploitation)?  no 18 1204                  [facility]: [phone]            Medication Administration Report for Karey Paulson as of 18   Legend:    Given Hold Not Given Due Canceled Entry Other Actions    Time Time (Time) Time  Time-Action       Inactive    Active    Linked        Medications 12/14/18 12/15/18 12/16/18 12/17/18 12/18/18 12/19/18 12/20/18   Completed Medications    potassium chloride ER (K-DUR/KLOR-CON M) CR tablet 40 mEq  Dose: 40 mEq  Freq: ONCE Route: PO  Start: 18 1400   End: 18 1412   Admin Instructions: DO NOT CRUSH    Admin. Amount: 2 tablet (2 × 20 mEq tablet)  Last Admin: 18 1412  Dispense Loc: EMERGENCY DEPARTMENT  Administrations Remainin           1412 (40 mEq)-Given                       INTERAGENCY TRANSFER FORM - NOTES (H&P, Discharge Summary, Consults, Procedures, Therapies)   2018                      [facility]: [phone]            History & Physicals     No notes of this type exist for this encounter.      Discharge Summaries     No notes of this type exist for this encounter.      Consult Notes     No notes of this type exist for this encounter.         Progress Notes - Physician (Notes for yesterday and today)      ED Provider Notes by Mohan Gill MD at 2018 12:00 PM     Author:  Mohan Gill MD Service:  Emergency Medicine Author Type:  Physician    Filed:  2018  9:26 PM Date of Service:  2018 12:00 PM Creation Time:  2018 12:58 PM    Status:  Addendum :  Mohan Gill MD (Physician)    Related Notes:  Original Note by Milad Wilcox MD (Physician) filed at 2018  7:12 PM         History[TV.1]     Chief  "Complaint   Patient presents with     Altered Mental Status[AO.1]     HPI  Karey Paulson is a 89 year old female who comes in for episode of upper abdominal pain, SOB and decreased LOC.  Patient can't give a lot of details.  Said her stomach \"turned over\" and points to upper abdomen.  It is now feeling pretty good.  denies any diarrhea or constipation.  No dysuria.    Problem List:[TV.1]    Patient Active Problem List    Diagnosis Date Noted     Gastroenteritis due to norovirus 12/15/2018     Priority: Medium     Acute respiratory failure with hypoxia (H) 12/14/2018     Priority: Medium     Aspiration pneumonia (H) 12/13/2018     Priority: Medium     Aspiration into airway 12/13/2018     Priority: Medium     CAD (coronary artery disease) 12/13/2018     Priority: Medium     Sinus pause 12/04/2018     Priority: Medium     History of myocardial infarction 12/14/10 12/04/2018     Priority: Medium     Essential hypertension 12/04/2018     Priority: Medium     Hx of cardiac cath 12/14/10 with x3 stents to the RCA 12/04/2018     Priority: Medium     First degree heart block 12/04/2018     Priority: Medium     Generalized muscle weakness 12/04/2018     Priority: Medium     Bradycardia 11/08/2018     Priority: Medium     Syncope, unspecified syncope type 10/12/2018     Priority: Medium     Primary insomnia 04/11/2018     Priority: Medium     Weakness of both lower extremities 03/27/2018     Priority: Medium     S/P laparoscopic colectomy 11/14/2017     Priority: Medium     Diverticulosis of large intestine 10/22/2017     Priority: Medium     Overview:   S/p fle sig, 10/22/2017       Stricture of sigmoid colon (H) 10/22/2017     Priority: Medium     Overview:   S/p flex sig 10/22/2017       [place]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     Priority: Medium     Overview:        Recurrent UTI 10/21/2017     Priority: Medium     Overview:   2 in 2017, 10/13/2017 and 6/28/2017       Pancreatic cyst 10/20/2017     Priority: Medium     Overview: "   Cystic nodules, multiple; s/p CT Abd Pelv       GERD 12/31/2015     Priority: Medium     Backache 02/24/2013     Priority: Medium     Actinic keratosis 04/18/2012     Priority: Medium     Osteoarthrosis 11/08/2011     Priority: Medium     Mixed hyperlipidemia 01/29/2009     Priority: Medium     Overview:   Overview:   IMO Update 10/11       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     Priority: Medium[AO.1]        Past Medical History:[TV.1]    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      Acute myocardial infarction (H)      Cyst of pancreas      Diaphragmatic hernia without obstruction or gangrene      Diverticulosis of large intestine without perforation or abscess without bleeding      Edema      Epigastric pain      Essential (primary) hypertension      Gastro-esophageal reflux disease without esophagitis      Osteoarthritis      Other intestinal obstruction unspecified as to partial versus complete obstruction (CODE)      Personal history of malignant neoplasm of breast      Personal history of other medical treatment (CODE)      Postmenopausal atrophic vaginitis      Sepsis (H)      Urinary tract infection      Urinary tract infection[AO.1]        Past Surgical History:[TV.1]    Past Surgical History:   Procedure Laterality Date     ANGIOPLASTY      12-,Angioplasty with 3 bare-metal stents RCA     APPENDECTOMY OPEN      1948,Appendectomy     CHOLECYSTECTOMY      1995,[place]     EXTRACAPSULAR CATARACT EXTRATION WITH INTRAOCULAR LENS IMPLANT      12/2016,bilat     HYSTERECTOMY TOTAL ABDOMINAL      1967,[facility], ovaries remaining     MASTECTOMY SIMPLE      1993,[place]     OTHER SURGICAL HISTORY      10/24/2017,[id number],OTHER,Ellen Arambula and Jairo[AO.1]       Family History:[TV.1]    Family History   Problem Relation Age of Onset     Breast Cancer No family hx of         Cancer-breast[AO.1]       Social History:  Marital Status:   " [5][TV.1]  Social History     Tobacco Use     Smoking status: Never Smoker     Smokeless tobacco: Never Used   Substance Use Topics     Alcohol use: No     Alcohol/week: 0.0 oz     Drug use: No     Comment: Drug use: No[AO.1]        Medications:[TV.1]      acetaminophen (TYLENOL) 325 MG tablet   acetaminophen (TYLENOL) 500 MG tablet   acetaZOLAMIDE (DIAMOX) 250 MG tablet   alum & mag hydroxide-simethicone (MYLANTA/MAALOX) 200-200-20 MG/5ML SUSP suspension   amLODIPine (NORVASC) 5 MG tablet   amoxicillin-clavulanate (AUGMENTIN) 875-125 MG tablet   ASPIRIN LOW DOSE 81 MG EC tablet   brimonidine-timolol (COMBIGAN) 0.2-0.5 % ophthalmic solution   brinzolamide-brimonidine (SIMBRINZA) 1-0.2 % ophthalmic suspension   latanoprost (XALATAN) 0.005 % ophthalmic solution   losartan (COZAAR) 100 MG tablet   melatonin 3 MG tablet   order for DME   prednisoLONE acetate (PRED FORTE) 1 % ophthalmic suspension   ranitidine (ZANTAC) 150 MG tablet   timolol (TIMOPTIC) 0.5 % ophthalmic solution   VITAMIN D3 1000 UNITS tablet[AO.1]         Review of Systems   Constitutional: Negative for chills and fever.   HENT: Negative for congestion.    Eyes: Negative for visual disturbance.   Respiratory: Positive for shortness of breath. Negative for chest tightness.    Cardiovascular: Negative for chest pain.   Gastrointestinal: Positive for abdominal pain.   Genitourinary: Negative for hematuria.   Musculoskeletal: Negative for back pain.   Skin: Negative for rash and wound.   Neurological: Negative for syncope.   Hematological: Negative for adenopathy. Does not bruise/bleed easily.   Psychiatric/Behavioral: Negative for confusion.       Physical Exam[TV.1]   BP: 154/69  Pulse: 81  Temp: 98.6  F (37  C)  Resp: 18  Height: 167.6 cm (5' 6\")  Weight: 64 kg (141 lb)  SpO2: 94 %[AO.1]      Physical Exam   Constitutional: She appears well-developed and well-nourished. No distress.   HENT:   Head: Normocephalic and atraumatic.   Eyes: " Conjunctivae are normal. No scleral icterus.   Neck: Neck supple.   Cardiovascular: Normal rate and regular rhythm.   Pulmonary/Chest: Effort normal. She has rales in the right lower field.   Abdominal: Soft. There is no tenderness.   Musculoskeletal: She exhibits no deformity.   Lymphadenopathy:     She has no cervical adenopathy.   Neurological: She is alert.   Skin: Skin is warm and dry. No rash noted. She is not diaphoretic.   Psychiatric: She has a normal mood and affect.       ED Course[TV.1]     ED Course as of Dec 20 2126   Thu Dec 20, 2018   1527 Chloride: (!) 108[AO.1]     Procedures[TV.1]           Results for orders placed or performed during the hospital encounter of 12/20/18 (from the past 24 hour(s))   CBC with platelets differential   Result Value Ref Range    WBC 8.8 4.0 - 11.0 10e9/L    RBC Count 4.46 3.8 - 5.2 10e12/L    Hemoglobin 12.8 11.7 - 15.7 g/dL    Hematocrit 38.8 35.0 - 47.0 %    MCV 87 78 - 100 fl    MCH 28.7 26.5 - 33.0 pg    MCHC 33.0 31.5 - 36.5 g/dL    RDW 14.5 10.0 - 15.0 %    Platelet Count 290 150 - 450 10e9/L    Diff Method Automated Method     % Neutrophils 78.3 %    % Lymphocytes 11.6 %    % Monocytes 7.5 %    % Eosinophils 1.4 %    % Basophils 0.6 %    % Immature Granulocytes 0.6 %    Absolute Neutrophil 6.9 1.6 - 8.3 10e9/L    Absolute Lymphocytes 1.0 0.8 - 5.3 10e9/L    Absolute Monocytes 0.7 0.0 - 1.3 10e9/L    Absolute Eosinophils 0.1 0.0 - 0.7 10e9/L    Absolute Basophils 0.1 0.0 - 0.2 10e9/L    Abs Immature Granulocytes 0.1 0 - 0.4 10e9/L   Comprehensive metabolic panel   Result Value Ref Range    Sodium 138 134 - 144 mmol/L    Potassium 2.9 (L) 3.5 - 5.1 mmol/L    Chloride 108 (H) 98 - 107 mmol/L    Carbon Dioxide 21 21 - 31 mmol/L    Anion Gap 9 3 - 14 mmol/L    Glucose 143 (H) 70 - 105 mg/dL    Urea Nitrogen 14 7 - 25 mg/dL    Creatinine 0.45 (L) 0.60 - 1.20 mg/dL    GFR Estimate >90 >60 mL/min/[1.73_m2]    GFR Estimate If Black >90 >60 mL/min/[1.73_m2]    Calcium 9.2  8.6 - 10.3 mg/dL    Bilirubin Total 0.6 0.3 - 1.0 mg/dL    Albumin 3.1 (L) 3.5 - 5.7 g/dL    Protein Total 6.3 (L) 6.4 - 8.9 g/dL    Alkaline Phosphatase 44 34 - 104 U/L    ALT 19 7 - 52 U/L    AST 21 13 - 39 U/L   XR Chest 2 Views    Narrative    PROCEDURE:  XR CHEST 2 VW    HISTORY:  sob,decreased LOC.     COMPARISON:  12/13/2018    FINDINGS:   The cardiac silhouette is normal in size. The pulmonary vasculature is  normal.  There are small bilateral pleural effusions. There has been  marked interval improvement in the right lung infiltrates. No  pneumothorax.      Impression    IMPRESSION:  Small bilateral pleural effusions. Marked improvement in  right lung infiltrates.      NEPTALI GORDON MD   *UA reflex to Microscopic   Result Value Ref Range    Color Urine Yellow     Appearance Urine Clear     Glucose Urine Negative NEG^Negative mg/dL    Bilirubin Urine Negative NEG^Negative    Ketones Urine Trace (A) NEG^Negative mg/dL    Specific Gravity Urine 1.010 1.000 - 1.030    Blood Urine Negative NEG^Negative    pH Urine 7.0 5.0 - 9.0 pH    Protein Albumin Urine Negative NEG^Negative mg/dL    Urobilinogen Urine 0.2 0.2 - 1.0 EU/dL    Nitrite Urine Negative NEG^Negative    Leukocyte Esterase Urine Negative NEG^Negative    Source Midstream Urine    Potassium   Result Value Ref Range    Potassium 3.3 (L) 3.5 - 5.1 mmol/L       Medications   potassium chloride ER (K-DUR/KLOR-CON M) CR tablet 40 mEq (40 mEq Oral Given 12/20/18 1412)[AO.1]       Assessments & Plan (with Medical Decision Making)     I have reviewed the nursing notes.    I have reviewed the findings, diagnosis, plan and need for follow up with the patient.[TV.1]  Patient's labs and x-ray are for the most part reassuring.  Her pneumonia seems better than last time we checked it.  No sign of UTI.  She is however quite hypokalemic.  She is given some oral potassium earlier in her stay.  I will get her a second dose here this evening.    In reviewing her recent  hospitalization, it was the opinion of Dr. Fall that she would do better in a nursing home.  Family wished to try to keep her in her current assisted living as long as they could.  They have been in contact with the staff at her assisted living who told her that as soon as they felt it was too much for them to take care of they would let them know.  Today she seems much worse.  She is a heavy 2 person assist.  The staff at the assisted living was contacted and they do feel that she is getting to be a little too, acute for them to take care of there.  We have spoken with her power of , her granddaughter who is in agreement with looking for alternate placement.  We are pursuing placement at [facility] at this time.[TV.2]    1912 is currently change of shift, [facility] has not accepted her at this point and they are still looking at it.  Care patient will be turned over to Dr. Gill at this time.[TV.3]    9:16 PM she is accepted to [facility] and will shortly have transport.  Potassium is improved and avoid further supplementation pending anticipated improved diet.  Discussed CODE STATUS with patient and she reports that if her heart would stop or she would stop breathing and it is her time to go she would just rather be allowed to die.[AO.2]  Mohan Gill[AO.3]        Medication List      There are no discharge medications for this visit.         Final diagnoses:   Generalized muscle weakness   Impaired mobility and ADLs[AO.1]       12/20/2018   [facility][TV.1]     Milad Wilcox MD  12/20/18 1912[TV.4]       Mohan Gill MD  12/20/18 2126  [AO.1]     Attribution Faria    AO.1 - Mohan Gill MD on 12/20/2018  9:26 PM  AO.2 - Mohan Gill MD on 12/20/2018  9:16 PM  AO.3 - Mohan Gill MD on 12/20/2018  9:17 PM  TV.1 - Milad Wilcox MD on 12/20/2018 12:58 PM  TV.2 - Milad Wilcox MD on 12/20/2018  6:34 PM  TV.3 - Brenden  MD Milad on 12/20/2018  7:11 PM  TV.4 - Milad Wilcox MD on 12/20/2018  7:12 PM                     Procedure Notes     No notes of this type exist for this encounter.      Progress Notes - Therapies (Notes from 12/17/18 through 12/20/18)     No notes of this type exist for this encounter.                                          INTERAGENCY TRANSFER FORM - LAB / IMAGING / EKG / EMG RESULTS   12/20/2018                      [facility]: [phone]            Unresulted Labs (24h ago, onward)    None         Lab Results - 3 Days      Potassium [[id number]] (Abnormal)  Resulted: 12/20/18 2105, Result status: Final result   Ordering provider:  Mohan Gill MD  12/20/18 2029 Resulting lab:  [facility]    Specimen Information    Type Source Collected On   Blood   12/20/18 2038          Components    Component Value Reference Range Flag Lab   Potassium 3.3 3.5 - 5.1 mmol/L  [facility]            *UA reflex to Microscopic [[id number]] (Abnormal)  Resulted: 12/20/18 1439, Result status: Final result   Ordering provider:  Milad Wilcox MD  12/20/18 1258 Resulting lab:  [facility]    Specimen Information    Type Source Collected On   Midstream Urine Urine clean catch 12/20/18 1425          Components    Component Value Reference Range Flag Lab   Color Urine Yellow     [facility]   Appearance Urine Clear     [facility]   Glucose Urine Negative NEG^Negative mg/dL   [facility]   Bilirubin Urine Negative NEG^Negative   [facility]   Ketones Urine Trace NEG^Negative mg/dL A [facility]   Specific [place] Urine 1.010 1.000 - 1.030   [facility]   Blood Urine Negative NEG^Negative   [facility]   pH Urine 7.0 5.0 - 9.0 pH   [facility]   Protein Albumin Urine Negative NEG^Negative mg/dL   [facility]   Urobilinogen Urine 0.2 0.2 - 1.0 EU/dL   [facility]   Nitrite Urine Negative NEG^Negative   [facility]   Leukocyte Esterase Urine Negative NEG^Negative    [facility]   Source Midstream Urine     [facility]            Comprehensive metabolic panel [[id number]] (Abnormal)  Resulted: 12/20/18 1351, Result status: Final result   Ordering provider:  Milad Wilcox MD  12/20/18 1254 Resulting lab:  [facility]    Specimen Information    Type Source Collected On   Blood   12/20/18 1318          Components    Component Value Reference Range Flag Lab   Sodium 138 134 - 144 mmol/L   [facility]   Potassium 2.9 3.5 - 5.1 mmol/L  [facility]   Chloride 108 98 - 107 mmol/L H [facility]   Carbon Dioxide 21 21 - 31 mmol/L   [facility]   Anion Gap 9 3 - 14 mmol/L   [facility]   Glucose 143 70 - 105 mg/dL H [facility]   Urea Nitrogen 14 7 - 25 mg/dL   [facility]   Creatinine 0.45 0.60 - 1.20 mg/dL  [facility]   GFR Estimate >90 >60 mL/min/{1.73_m2}   [facility]   GFR Estimate If Black >90 >60 mL/min/{1.73_m2}   [facility]   Calcium 9.2 8.6 - 10.3 mg/dL   [facility]   Bilirubin Total 0.6 0.3 - 1.0 mg/dL   [facility]   Albumin 3.1 3.5 - 5.7 g/dL  [facility]   Protein Total 6.3 6.4 - 8.9 g/dL  [facility]   Alkaline Phosphatase 44 34 - 104 U/L   [facility]   ALT 19 7 - 52 U/L   [facility]   AST 21 13 - 39 U/L   [facility]            CBC with platelets differential [[id number]]  Resulted: 12/20/18 1327, Result status: Final result   Ordering provider:  Milad Wilcox MD  12/20/18 1252 Resulting lab:  [facility]    Specimen Information    Type Source Collected On   Blood   12/20/18 1318          Components    Component Value Reference Range Flag Lab   WBC 8.8 4.0 - 11.0 10e9/L   [facility]   RBC Count 4.46 3.8 - 5.2 10e12/L   [facility]   Hemoglobin 12.8 11.7 - 15.7 g/dL   [facility]   Hematocrit 38.8 35.0 - 47.0 %   [facility]   MCV 87 78 - 100 fl   [facility]   MCH 28.7 26.5 - 33.0 pg   [facility]   MCHC 33.0 31.5 - 36.5 g/dL   [facility]   RDW 14.5 10.0 - 15.0 %   [facility]   Platelet Count 290 150 - 450 10e9/L   [facility]   Diff Method Automated  Method     [facility]   % Neutrophils 78.3 %   [facility]   % Lymphocytes 11.6 %   [facility]   % Monocytes 7.5 %   [facility]   % Eosinophils 1.4 %   [facility]   % Basophils 0.6 %   [facility]   % Immature Granulocytes 0.6 %   [facility]   Absolute Neutrophil 6.9 1.6 - 8.3 10e9/L   [facility]   Absolute Lymphocytes 1.0 0.8 - 5.3 10e9/L   [facility]   Absolute Monocytes 0.7 0.0 - 1.3 10e9/L   [facility]   Absolute Eosinophils 0.1 0.0 - 0.7 10e9/L   [facility]   Absolute Basophils 0.1 0.0 - 0.2 10e9/L   [facility]   Abs Immature Granulocytes 0.1 0 - 0.4 10e9/L   [facility]            Testing Performed By     Lab - Abbreviation Name Director Address Valid Date Range    1743 - [facility] Unknown [address]  [facility] [address] 08/07/15 0948 - Present               Imaging Results - 3 Days      XR Chest 2 Views [[id number]]  Resulted: 12/20/18 1344, Result status: Final result   Ordering provider:  Milad Wilcox MD  12/20/18 1258 Resulted by:  Neptali Gordon MD   Performed:  12/20/18 1316 - 12/20/18 1341 Accession number:  [id number]   Resulting lab:  RADIOLOGY RESULTS   Narrative:  PROCEDURE:  XR CHEST 2 VW    HISTORY:  sob,decreased LOC.     COMPARISON:  12/13/2018    FINDINGS:   The cardiac silhouette is normal in size. The pulmonary vasculature is  normal.  There are small bilateral pleural effusions. There has been  marked interval improvement in the right lung infiltrates. No  pneumothorax.     Impression:  IMPRESSION:  Small bilateral pleural effusions. Marked improvement in  right lung infiltrates.      NEPTALI GORDON MD      Testing Performed By     Lab - Abbreviation Name Director Address Valid Date Range    104 - [facility] RADIOLOGY RESULTS Unknown Unknown 02/16/05 1553 - Present            Encounter-Level Documents:    There are no encounter-level documents.     Order-Level Documents:    There are no order-level documents.

## 2018-12-21 NOTE — ED NOTES
Andrew updated regarding new potassium result.    Evelina Cunningham RN on 12/20/2018 at 9:15 PM

## 2018-12-21 NOTE — PROGRESS NOTES
:    Follow up call placed to the senior linkage line this am to assure that a PAS screen was completed for patient yesterday.  Spoke with staff at [facility] who stated a PAS was completed.  Confirmation number [id number].    ELLE Arauz on 12/21/2018 at 8:27 AM

## 2018-12-21 NOTE — ED NOTES
Manuel called, nurse is on break nurse will call back.    Evelina Cunningham RN on 12/20/2018 at 6:31 PM

## 2018-12-21 NOTE — ED NOTES
Report given to Andrew at [facility].    Evelina Cunningham RN on 12/20/2018 at 8:25 PM

## 2018-12-28 PROBLEM — E87.6 HYPOKALEMIA: Status: ACTIVE | Noted: 2018-01-01

## 2018-12-28 NOTE — NURSING NOTE
"Chief Complaint   Patient presents with     RECHECK     Pneumonia     She is at [facility] for rehab- plan is to move to assisted living near [place] when she is ready to be discharged. Currently needs to continue with Therapy.  Please review her labs. Her potassium was elevated.    Initial /70   Pulse 68   Temp 97.7  F (36.5  C) (Temporal)   Ht 1.676 m (5' 6\")   Wt 56.2 kg (124 lb)   SpO2 95%   BMI 20.01 kg/m   Estimated body mass index is 20.01 kg/m  as calculated from the following:    Height as of this encounter: 1.676 m (5' 6\").    Weight as of this encounter: 56.2 kg (124 lb).    Medication Reconciliation: complete      Norma J. Gosselin, LPN  "

## 2018-12-28 NOTE — PROGRESS NOTES
SUBJECTIVE:   Karey Paulson is a 89 year old female who presents to clinic today for the following health issues: Follow-up pneumonia hypokalemia    Patient arrives here for follow-up of pneumonia.  She was recently hospitalized.  She was subsequently sent to [place].  She states that she is having a terrible time there but would not be specific.  Indicates they woke her up at 4 in the morning and did not take her to physical therapy until 9.  Staff is present who indicates they woke her up because of the requirement that she needs to be turned.  Her family is looking for a place closer to home in [place].  Otherwise reports physical therapy is going well.          Patient Active Problem List    Diagnosis Date Noted     Hypokalemia 12/28/2018     Priority: Medium     Gastroenteritis due to norovirus 12/15/2018     Priority: Medium     Acute respiratory failure with hypoxia (H) 12/14/2018     Priority: Medium     Aspiration pneumonia (H) 12/13/2018     Priority: Medium     Aspiration into airway 12/13/2018     Priority: Medium     CAD (coronary artery disease) 12/13/2018     Priority: Medium     Sinus pause 12/04/2018     Priority: Medium     History of myocardial infarction 12/14/10 12/04/2018     Priority: Medium     Essential hypertension 12/04/2018     Priority: Medium     Hx of cardiac cath 12/14/10 with x3 stents to the RCA 12/04/2018     Priority: Medium     First degree heart block 12/04/2018     Priority: Medium     Generalized muscle weakness 12/04/2018     Priority: Medium     Bradycardia 11/08/2018     Priority: Medium     Syncope, unspecified syncope type 10/12/2018     Priority: Medium     Primary insomnia 04/11/2018     Priority: Medium     Weakness of both lower extremities 03/27/2018     Priority: Medium     S/P laparoscopic colectomy 11/14/2017     Priority: Medium     Diverticulosis of large intestine 10/22/2017     Priority: Medium     Overview:   S/p fle sig, 10/22/2017       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/2017     Priority: Medium     Overview:   S/p flex sig 10/22/2017       [place]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     Priority: Medium     Overview:        Recurrent UTI 10/21/2017     Priority: Medium     Overview:   2 in 2017, 10/13/2017 and 2017       Pancreatic cyst 10/20/2017     Priority: Medium     Overview:   Cystic nodules, multiple; s/p CT Abd Pelv       GERD 2015     Priority: Medium     Backache 2013     Priority: Medium     Actinic keratosis 2012     Priority: Medium     Osteoarthrosis 2011     Priority: Medium     Mixed hyperlipidemia 2009     Priority: Medium     Overview:   Overview:   IMO Update 10/11       Borderline glaucoma with ocular hypertension 2007     Priority: Medium     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     2017     Acute myocardial infarction (H)     2010     Cyst of pancreas     10/20/2017,Cystic nodules, multiple; s/p CT Abd Pelv     Diaphragmatic hernia without obstruction or gangrene     10/20/2017,s/p CT Abd/Pelv     Diverticulosis of large intestine without perforation or abscess without bleeding     10/22/2017,S/p fle sig, 10/22/2017     Edema     2011     Epigastric pain     2015     Essential (primary) hypertension     No Comments Provided     Gastro-esophageal reflux disease without esophagitis     2015     Osteoarthritis     2011     Other intestinal obstruction unspecified as to partial versus complete obstruction (CODE)     10/22/2017,S/p flex sig 10/22/2017     Personal history of malignant neoplasm of breast     Questionable 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     Personal history of other medical treatment (CODE)          Postmenopausal atrophic vaginitis     2012     Sepsis (H)     2017,E coli bacteremia; E coli UTI (resistant to Cipro)     Urinary tract infection     2017,Resistant to cipro     Urinary  "tract infection     10/21/2017,2 in 2017, 10/13/2017 and 6/28/2017      Past Surgical History:   Procedure Laterality Date     ANGIOPLASTY      12-,Angioplasty with 3 bare-metal stents RCA     APPENDECTOMY OPEN      1948,Appendectomy     CHOLECYSTECTOMY      1995,[place]     EXTRACAPSULAR CATARACT EXTRATION WITH INTRAOCULAR LENS IMPLANT      12/2016,bilat     HYSTERECTOMY TOTAL ABDOMINAL      1967,[facility], ovaries remaining     MASTECTOMY SIMPLE      1993,[place]     OTHER SURGICAL HISTORY      10/24/2017,240917,OTHER,Ellen Arambula and Jairo     Social History     Social History Narrative    ; living @  [facility]-moved to [facility] after surgery 10/2017.  1 son passed away at 66.     Allergies   Allergen Reactions     Atorvastatin Other (See Comments) and Nausea and Vomiting     Myalgia     Ciprofloxacin Other (See Comments)     Erythromycin      Lisinopril Cough     Simvastatin Other (See Comments)     Achey muscles.      Sulfamethoxazole W/Trimethoprim Unknown     Nausea and shaking       Review of Systems     OBJECTIVE:     /70   Pulse 68   Temp 97.7  F (36.5  C) (Temporal)   Ht 1.676 m (5' 6\")   Wt 56.2 kg (124 lb)   SpO2 95%   BMI 20.01 kg/m    Body mass index is 20.01 kg/m .  Physical Exam   Constitutional: She appears well-developed.   Cardiovascular: Normal rate and regular rhythm.   Pulmonary/Chest: No respiratory distress.   Musculoskeletal: Normal range of motion.       Diagnostic Test Results:  Results for orders placed or performed in visit on 12/28/18 (from the past 24 hour(s))   Potassium   Result Value Ref Range    Potassium 3.8 3.5 - 5.1 mmol/L       ASSESSMENT/PLAN:         1. Hypokalemia  Satisfactory continue to monitor  - Potassium; Future  - Potassium    2. Pneumonia due to infectious organism, unspecified laterality, unspecified part of lung  Continue to find other placement.  Once found transfer.  I suspect her  dissatisfaction with [facility] is due to " nursing home policies.        Byron Huerta MD  [facility]

## 2019-01-01 ENCOUNTER — APPOINTMENT (OUTPATIENT)
Dept: CT IMAGING | Facility: HOSPITAL | Age: 84
End: 2019-01-01
Attending: PHYSICIAN ASSISTANT
Payer: MEDICARE

## 2019-01-01 ENCOUNTER — HOSPITAL ENCOUNTER (INPATIENT)
Facility: HOSPITAL | Age: 84
LOS: 7 days | DRG: 871 | End: 2019-11-21
Attending: INTERNAL MEDICINE | Admitting: INTERNAL MEDICINE
Payer: MEDICARE

## 2019-01-01 ENCOUNTER — NURSING HOME VISIT (OUTPATIENT)
Dept: FAMILY MEDICINE | Facility: OTHER | Age: 84
End: 2019-01-01
Payer: MEDICARE

## 2019-01-01 ENCOUNTER — APPOINTMENT (OUTPATIENT)
Dept: GENERAL RADIOLOGY | Facility: HOSPITAL | Age: 84
DRG: 871 | End: 2019-01-01
Attending: INTERNAL MEDICINE
Payer: MEDICARE

## 2019-01-01 ENCOUNTER — NURSING HOME DICTATION (OUTPATIENT)
Dept: INTERNAL MEDICINE | Facility: OTHER | Age: 84
End: 2019-01-01

## 2019-01-01 ENCOUNTER — DOCUMENTATION ONLY (OUTPATIENT)
Dept: OTHER | Facility: CLINIC | Age: 84
End: 2019-01-01

## 2019-01-01 ENCOUNTER — APPOINTMENT (OUTPATIENT)
Dept: GENERAL RADIOLOGY | Facility: HOSPITAL | Age: 84
End: 2019-01-01
Attending: SURGERY
Payer: MEDICARE

## 2019-01-01 ENCOUNTER — TRANSFERRED RECORDS (OUTPATIENT)
Dept: HEALTH INFORMATION MANAGEMENT | Facility: CLINIC | Age: 84
End: 2019-01-01

## 2019-01-01 ENCOUNTER — HOSPITAL ENCOUNTER (OUTPATIENT)
Facility: HOSPITAL | Age: 84
Setting detail: OBSERVATION
Discharge: NURSING, REHAB OR LONG-TERM CARE | End: 2019-09-16
Attending: NURSE PRACTITIONER | Admitting: INTERNAL MEDICINE
Payer: MEDICARE

## 2019-01-01 ENCOUNTER — TELEPHONE (OUTPATIENT)
Dept: FAMILY MEDICINE | Facility: OTHER | Age: 84
End: 2019-01-01

## 2019-01-01 ENCOUNTER — APPOINTMENT (OUTPATIENT)
Dept: SPEECH THERAPY | Facility: HOSPITAL | Age: 84
DRG: 871 | End: 2019-01-01
Attending: INTERNAL MEDICINE
Payer: MEDICARE

## 2019-01-01 ENCOUNTER — OFFICE VISIT (OUTPATIENT)
Dept: CARDIOLOGY | Facility: OTHER | Age: 84
End: 2019-01-01
Attending: INTERNAL MEDICINE
Payer: MEDICARE

## 2019-01-01 ENCOUNTER — NURSING HOME VISIT (OUTPATIENT)
Dept: GERIATRICS | Facility: OTHER | Age: 84
End: 2019-01-01
Attending: NURSE PRACTITIONER
Payer: MEDICARE

## 2019-01-01 ENCOUNTER — MEDICAL CORRESPONDENCE (OUTPATIENT)
Dept: HEALTH INFORMATION MANAGEMENT | Facility: CLINIC | Age: 84
End: 2019-01-01

## 2019-01-01 ENCOUNTER — HOSPITAL ENCOUNTER (EMERGENCY)
Facility: HOSPITAL | Age: 84
Discharge: HOME | End: 2019-04-30
Attending: PHYSICIAN ASSISTANT | Admitting: PHYSICIAN ASSISTANT
Payer: MEDICARE

## 2019-01-01 ENCOUNTER — APPOINTMENT (OUTPATIENT)
Dept: CT IMAGING | Facility: HOSPITAL | Age: 84
End: 2019-01-01
Attending: NURSE PRACTITIONER
Payer: MEDICARE

## 2019-01-01 ENCOUNTER — OFFICE VISIT (OUTPATIENT)
Dept: FAMILY MEDICINE | Facility: OTHER | Age: 84
End: 2019-01-01
Attending: FAMILY MEDICINE
Payer: MEDICARE

## 2019-01-01 ENCOUNTER — APPOINTMENT (OUTPATIENT)
Dept: CT IMAGING | Facility: HOSPITAL | Age: 84
End: 2019-01-01
Attending: INTERNAL MEDICINE
Payer: MEDICARE

## 2019-01-01 VITALS
RESPIRATION RATE: 22 BRPM | TEMPERATURE: 97.3 F | OXYGEN SATURATION: 96 % | DIASTOLIC BLOOD PRESSURE: 55 MMHG | HEIGHT: 66 IN | WEIGHT: 114 LBS | SYSTOLIC BLOOD PRESSURE: 103 MMHG | BODY MASS INDEX: 18.32 KG/M2 | HEART RATE: 54 BPM

## 2019-01-01 VITALS
SYSTOLIC BLOOD PRESSURE: 106 MMHG | DIASTOLIC BLOOD PRESSURE: 67 MMHG | HEART RATE: 65 BPM | OXYGEN SATURATION: 98 % | TEMPERATURE: 97.3 F

## 2019-01-01 VITALS
BODY MASS INDEX: 17.83 KG/M2 | WEIGHT: 110.45 LBS | SYSTOLIC BLOOD PRESSURE: 155 MMHG | HEART RATE: 61 BPM | DIASTOLIC BLOOD PRESSURE: 62 MMHG | RESPIRATION RATE: 16 BRPM | OXYGEN SATURATION: 99 % | TEMPERATURE: 98.6 F

## 2019-01-01 VITALS
BODY MASS INDEX: 17.92 KG/M2 | WEIGHT: 111 LBS | TEMPERATURE: 97.4 F | OXYGEN SATURATION: 96 % | HEART RATE: 77 BPM | RESPIRATION RATE: 16 BRPM | SYSTOLIC BLOOD PRESSURE: 126 MMHG | DIASTOLIC BLOOD PRESSURE: 62 MMHG

## 2019-01-01 VITALS
RESPIRATION RATE: 14 BRPM | OXYGEN SATURATION: 62 % | HEART RATE: 62 BPM | DIASTOLIC BLOOD PRESSURE: 99 MMHG | TEMPERATURE: 97.9 F | SYSTOLIC BLOOD PRESSURE: 159 MMHG

## 2019-01-01 VITALS
SYSTOLIC BLOOD PRESSURE: 110 MMHG | DIASTOLIC BLOOD PRESSURE: 60 MMHG | WEIGHT: 126 LBS | HEART RATE: 68 BPM | BODY MASS INDEX: 20.34 KG/M2

## 2019-01-01 VITALS
TEMPERATURE: 101.5 F | RESPIRATION RATE: 32 BRPM | BODY MASS INDEX: 16.26 KG/M2 | HEART RATE: 101 BPM | OXYGEN SATURATION: 85 % | WEIGHT: 100.75 LBS | SYSTOLIC BLOOD PRESSURE: 86 MMHG | DIASTOLIC BLOOD PRESSURE: 46 MMHG

## 2019-01-01 VITALS
TEMPERATURE: 98 F | DIASTOLIC BLOOD PRESSURE: 64 MMHG | HEART RATE: 69 BPM | WEIGHT: 119 LBS | BODY MASS INDEX: 19.21 KG/M2 | RESPIRATION RATE: 18 BRPM | SYSTOLIC BLOOD PRESSURE: 129 MMHG | OXYGEN SATURATION: 94 %

## 2019-01-01 DIAGNOSIS — M62.81 GENERALIZED MUSCLE WEAKNESS: ICD-10-CM

## 2019-01-01 DIAGNOSIS — Z98.890 HX OF CARDIAC CATH: ICD-10-CM

## 2019-01-01 DIAGNOSIS — I60.9 SUBARACHNOID HEMORRHAGE (H): ICD-10-CM

## 2019-01-01 DIAGNOSIS — Z87.898 H/O SYNCOPE: ICD-10-CM

## 2019-01-01 DIAGNOSIS — H40.50X0 GLAUCOMA ASSOCIATED WITH ANOMALIES OF IRIS: Primary | ICD-10-CM

## 2019-01-01 DIAGNOSIS — I10 ESSENTIAL HYPERTENSION: ICD-10-CM

## 2019-01-01 DIAGNOSIS — J18.9 HCAP (HEALTHCARE-ASSOCIATED PNEUMONIA): ICD-10-CM

## 2019-01-01 DIAGNOSIS — I44.0 FIRST DEGREE HEART BLOCK: ICD-10-CM

## 2019-01-01 DIAGNOSIS — I25.2 HISTORY OF MYOCARDIAL INFARCTION: ICD-10-CM

## 2019-01-01 DIAGNOSIS — I25.10 CORONARY ARTERY DISEASE INVOLVING NATIVE CORONARY ARTERY OF NATIVE HEART WITHOUT ANGINA PECTORIS: ICD-10-CM

## 2019-01-01 DIAGNOSIS — R00.1 BRADYCARDIA: Primary | ICD-10-CM

## 2019-01-01 DIAGNOSIS — Z86.79 HX OF SUBARACHNOID HEMORRHAGE: ICD-10-CM

## 2019-01-01 DIAGNOSIS — J96.02 ACUTE RESPIRATORY FAILURE WITH HYPERCAPNIA (H): ICD-10-CM

## 2019-01-01 DIAGNOSIS — Z90.49 S/P LAPAROSCOPIC COLECTOMY: ICD-10-CM

## 2019-01-01 DIAGNOSIS — I45.5 SINUS PAUSE: ICD-10-CM

## 2019-01-01 DIAGNOSIS — Z78.9 NURSING HOME RESIDENT: Primary | ICD-10-CM

## 2019-01-01 DIAGNOSIS — Q13.2 GLAUCOMA ASSOCIATED WITH ANOMALIES OF IRIS: Primary | ICD-10-CM

## 2019-01-01 DIAGNOSIS — B37.41 CANDIDA CYSTITIS: ICD-10-CM

## 2019-01-01 DIAGNOSIS — Z91.81 PERSONAL HISTORY OF FALL: ICD-10-CM

## 2019-01-01 DIAGNOSIS — N39.0 URINARY TRACT INFECTION: ICD-10-CM

## 2019-01-01 DIAGNOSIS — Z87.898 HISTORY OF BRADYCARDIA: ICD-10-CM

## 2019-01-01 DIAGNOSIS — J69.0 ASPIRATION PNEUMONIA DUE TO VOMIT, UNSPECIFIED LATERALITY, UNSPECIFIED PART OF LUNG (H): ICD-10-CM
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
## 2019-01-01 DIAGNOSIS — R00.1 BRADYCARDIA: ICD-10-CM

## 2019-01-01 DIAGNOSIS — R29.898 WEAKNESS OF BOTH LOWER EXTREMITIES: Primary | ICD-10-CM

## 2019-01-01 DIAGNOSIS — T17.908S ASPIRATION INTO AIRWAY, SEQUELA: ICD-10-CM

## 2019-01-01 DIAGNOSIS — Q13.2 GLAUCOMA ASSOCIATED WITH ANOMALIES OF IRIS: ICD-10-CM

## 2019-01-01 DIAGNOSIS — Z87.898 H/O SYNCOPE: Primary | ICD-10-CM

## 2019-01-01 DIAGNOSIS — B37.9 INFECTION DUE TO CANDIDA ALBICANS: Primary | ICD-10-CM

## 2019-01-01 DIAGNOSIS — S00.01XA ABRASION OF SCALP, INITIAL ENCOUNTER: ICD-10-CM

## 2019-01-01 DIAGNOSIS — J96.01 ACUTE RESPIRATORY FAILURE WITH HYPOXIA (H): Primary | ICD-10-CM

## 2019-01-01 DIAGNOSIS — E78.2 MIXED HYPERLIPIDEMIA: ICD-10-CM

## 2019-01-01 DIAGNOSIS — K21.9 GASTROESOPHAGEAL REFLUX DISEASE WITHOUT ESOPHAGITIS: ICD-10-CM

## 2019-01-01 DIAGNOSIS — I48.91 ATRIAL FIBRILLATION WITH RVR (H): ICD-10-CM

## 2019-01-01 DIAGNOSIS — H40.50X0 GLAUCOMA ASSOCIATED WITH ANOMALIES OF IRIS: ICD-10-CM

## 2019-01-01 DIAGNOSIS — N39.0 RECURRENT UTI: ICD-10-CM

## 2019-01-01 LAB
ALBUMIN SERPL-MCNC: 3.2 G/DL (ref 3.4–5)
ALBUMIN SERPL-MCNC: 3.4 G/DL (ref 3.4–5)
ALBUMIN SERPL-MCNC: 3.4 G/DL (ref 3.4–5)
ALBUMIN UR-MCNC: NEGATIVE MG/DL
ALP SERPL-CCNC: 56 U/L (ref 40–150)
ALP SERPL-CCNC: 66 U/L (ref 40–150)
ALP SERPL-CCNC: 66 U/L (ref 40–150)
ALT SERPL W P-5'-P-CCNC: 21 U/L (ref 0–50)
ALT SERPL W P-5'-P-CCNC: 26 U/L (ref 0–50)
ALT SERPL W P-5'-P-CCNC: 32 U/L (ref 0–50)
ANION GAP SERPL CALCULATED.3IONS-SCNC: 10 MMOL/L (ref 3–14)
ANION GAP SERPL CALCULATED.3IONS-SCNC: 5 MMOL/L (ref 3–14)
ANION GAP SERPL CALCULATED.3IONS-SCNC: 6 MMOL/L (ref 3–14)
ANION GAP SERPL CALCULATED.3IONS-SCNC: 7 MMOL/L (ref 3–14)
ANION GAP SERPL CALCULATED.3IONS-SCNC: 8 MMOL/L (ref 3–14)
APPEARANCE UR: CLEAR
AST SERPL W P-5'-P-CCNC: 14 U/L (ref 0–45)
AST SERPL W P-5'-P-CCNC: 15 U/L (ref 0–45)
AST SERPL W P-5'-P-CCNC: 32 U/L (ref 0–45)
BACTERIA #/AREA URNS HPF: ABNORMAL /HPF
BACTERIA #/AREA URNS HPF: ABNORMAL /HPF
BACTERIA SPEC CULT: ABNORMAL
BACTERIA SPEC CULT: NORMAL
BASE DEFICIT BLDA-SCNC: 7.8 MMOL/L
BASE DEFICIT BLDA-SCNC: 7.8 MMOL/L
BASOPHILS # BLD AUTO: 0 10E9/L (ref 0–0.2)
BASOPHILS # BLD AUTO: 0.1 10E9/L (ref 0–0.2)
BASOPHILS NFR BLD AUTO: 0.1 %
BASOPHILS NFR BLD AUTO: 0.5 %
BASOPHILS NFR BLD AUTO: 1.1 %
BASOPHILS NFR BLD AUTO: 1.5 %
BILIRUB SERPL-MCNC: 0.3 MG/DL (ref 0.2–1.3)
BILIRUB SERPL-MCNC: 0.4 MG/DL (ref 0.2–1.3)
BILIRUB SERPL-MCNC: 0.6 MG/DL (ref 0.2–1.3)
BILIRUB UR QL STRIP: NEGATIVE
BUN SERPL-MCNC: 27 MG/DL (ref 7–30)
BUN SERPL-MCNC: 28 MG/DL (ref 7–30)
BUN SERPL-MCNC: 28 MG/DL (ref 7–30)
BUN SERPL-MCNC: 30 MG/DL (ref 7–30)
BUN SERPL-MCNC: 33 MG/DL (ref 7–30)
CALCIUM SERPL-MCNC: 8.8 MG/DL (ref 8.5–10.1)
CALCIUM SERPL-MCNC: 9.3 MG/DL (ref 8.5–10.1)
CALCIUM SERPL-MCNC: 9.4 MG/DL (ref 8.5–10.1)
CALCIUM SERPL-MCNC: 9.4 MG/DL (ref 8.5–10.1)
CALCIUM SERPL-MCNC: 9.5 MG/DL (ref 8.5–10.1)
CHLORIDE SERPL-SCNC: 110 MMOL/L (ref 94–109)
CHLORIDE SERPL-SCNC: 113 MMOL/L (ref 94–109)
CHLORIDE SERPL-SCNC: 114 MMOL/L (ref 94–109)
CHLORIDE SERPL-SCNC: 114 MMOL/L (ref 94–109)
CHLORIDE SERPL-SCNC: 119 MMOL/L (ref 94–109)
CO2 SERPL-SCNC: 20 MMOL/L (ref 20–32)
CO2 SERPL-SCNC: 21 MMOL/L (ref 20–32)
CO2 SERPL-SCNC: 21 MMOL/L (ref 20–32)
CO2 SERPL-SCNC: 23 MMOL/L (ref 20–32)
CO2 SERPL-SCNC: 25 MMOL/L (ref 20–32)
COLOR UR AUTO: NORMAL
COLOR UR AUTO: YELLOW
COLOR UR AUTO: YELLOW
CREAT SERPL-MCNC: 0.47 MG/DL (ref 0.52–1.04)
CREAT SERPL-MCNC: 0.58 MG/DL (ref 0.52–1.04)
CREAT SERPL-MCNC: 0.6 MG/DL (ref 0.52–1.04)
CREAT SERPL-MCNC: 0.61 MG/DL (ref 0.52–1.04)
CREAT SERPL-MCNC: 0.67 MG/DL (ref 0.4–1)
CREAT SERPL-MCNC: 0.72 MG/DL (ref 0.52–1.04)
CRP SERPL-MCNC: 32.6 MG/L (ref 0–8)
DIFFERENTIAL METHOD BLD: ABNORMAL
DIFFERENTIAL METHOD BLD: NORMAL
EOSINOPHIL # BLD AUTO: 0 10E9/L (ref 0–0.7)
EOSINOPHIL # BLD AUTO: 0 10E9/L (ref 0–0.7)
EOSINOPHIL # BLD AUTO: 0.1 10E9/L (ref 0–0.7)
EOSINOPHIL # BLD AUTO: 0.2 10E9/L (ref 0–0.7)
EOSINOPHIL NFR BLD AUTO: 0 %
EOSINOPHIL NFR BLD AUTO: 0 %
EOSINOPHIL NFR BLD AUTO: 1 %
EOSINOPHIL NFR BLD AUTO: 3.2 %
ERYTHROCYTE [DISTWIDTH] IN BLOOD BY AUTOMATED COUNT: 13.9 % (ref 10–15)
ERYTHROCYTE [DISTWIDTH] IN BLOOD BY AUTOMATED COUNT: 14.4 % (ref 10–15)
ERYTHROCYTE [DISTWIDTH] IN BLOOD BY AUTOMATED COUNT: 14.4 % (ref 10–15)
ERYTHROCYTE [DISTWIDTH] IN BLOOD BY AUTOMATED COUNT: 14.5 % (ref 10–15)
ERYTHROCYTE [DISTWIDTH] IN BLOOD BY AUTOMATED COUNT: 15.2 % (ref 10–15)
ERYTHROCYTE [DISTWIDTH] IN BLOOD BY AUTOMATED COUNT: 15.9 % (ref 10–15)
GFR SERPL CREATININE-BSD FRML MDRD: 72 ML/MIN/{1.73_M2}
GFR SERPL CREATININE-BSD FRML MDRD: 77 ML/MIN/{1.73_M2}
GFR SERPL CREATININE-BSD FRML MDRD: 78 ML/MIN/{1.73_M2}
GFR SERPL CREATININE-BSD FRML MDRD: 78 ML/MIN/{1.73_M2}
GFR SERPL CREATININE-BSD FRML MDRD: 84 ML/MIN/{1.73_M2}
GFR SERPL CREATININE-BSD FRML MDRD: >60 ML/MIN/1.73M2
GLUCOSE SERPL-MCNC: 126 MG/DL (ref 70–99)
GLUCOSE SERPL-MCNC: 197 MG/DL (ref 70–99)
GLUCOSE SERPL-MCNC: 242 MG/DL (ref 70–99)
GLUCOSE SERPL-MCNC: 64 MG/DL (ref 70–99)
GLUCOSE SERPL-MCNC: 82 MG/DL (ref 70–99)
GLUCOSE SERPL-MCNC: 94 MG/DL (ref 70–99)
GLUCOSE UR STRIP-MCNC: NEGATIVE MG/DL
HCO3 BLD-SCNC: 19 MMOL/L (ref 21–28)
HCO3 BLD-SCNC: 21 MMOL/L (ref 21–28)
HCT VFR BLD AUTO: 35 % (ref 35–47)
HCT VFR BLD AUTO: 36.4 % (ref 35–47)
HCT VFR BLD AUTO: 40.2 % (ref 35–47)
HCT VFR BLD AUTO: 41.6 % (ref 35–47)
HCT VFR BLD AUTO: 43.2 % (ref 35–47)
HCT VFR BLD AUTO: 48.3 % (ref 35–47)
HGB BLD-MCNC: 11.3 G/DL (ref 11.7–15.7)
HGB BLD-MCNC: 11.8 G/DL (ref 11.7–15.7)
HGB BLD-MCNC: 13.1 G/DL (ref 11.7–15.7)
HGB BLD-MCNC: 13.2 G/DL (ref 11.7–15.7)
HGB BLD-MCNC: 13.9 G/DL (ref 11.7–15.7)
HGB BLD-MCNC: 15.1 G/DL (ref 11.7–15.7)
HGB UR QL STRIP: ABNORMAL
HGB UR QL STRIP: NEGATIVE
HGB UR QL STRIP: NEGATIVE
IMM GRANULOCYTES # BLD: 0 10E9/L (ref 0–0.4)
IMM GRANULOCYTES # BLD: 0.1 10E9/L (ref 0–0.4)
IMM GRANULOCYTES NFR BLD: 0.1 %
IMM GRANULOCYTES NFR BLD: 0.2 %
IMM GRANULOCYTES NFR BLD: 0.3 %
IMM GRANULOCYTES NFR BLD: 0.6 %
KETONES UR STRIP-MCNC: NEGATIVE MG/DL
LACTATE BLD-SCNC: 1 MMOL/L (ref 0.7–2)
LACTATE BLD-SCNC: 1.3 MMOL/L (ref 0.7–2)
LACTATE BLD-SCNC: 2.1 MMOL/L (ref 0.7–2)
LACTATE BLD-SCNC: 2.1 MMOL/L (ref 0.7–2)
LACTATE BLD-SCNC: 2.6 MMOL/L (ref 0.7–2)
LACTATE BLD-SCNC: 3 MMOL/L (ref 0.7–2)
LACTATE BLD-SCNC: 3.2 MMOL/L (ref 0.7–2)
LEUKOCYTE ESTERASE UR QL STRIP: ABNORMAL
LEUKOCYTE ESTERASE UR QL STRIP: NEGATIVE
LEUKOCYTE ESTERASE UR QL STRIP: NEGATIVE
LYMPHOCYTES # BLD AUTO: 0.5 10E9/L (ref 0.8–5.3)
LYMPHOCYTES # BLD AUTO: 0.9 10E9/L (ref 0.8–5.3)
LYMPHOCYTES # BLD AUTO: 1.4 10E9/L (ref 0.8–5.3)
LYMPHOCYTES # BLD AUTO: 1.8 10E9/L (ref 0.8–5.3)
LYMPHOCYTES NFR BLD AUTO: 13 %
LYMPHOCYTES NFR BLD AUTO: 13.6 %
LYMPHOCYTES NFR BLD AUTO: 23.9 %
LYMPHOCYTES NFR BLD AUTO: 5.1 %
Lab: NORMAL
Lab: NORMAL
MAGNESIUM SERPL-MCNC: 1.6 MG/DL (ref 1.6–2.3)
MAGNESIUM SERPL-MCNC: 2 MG/DL (ref 1.6–2.3)
MAGNESIUM SERPL-MCNC: 2.3 MG/DL (ref 1.6–2.3)
MCH RBC QN AUTO: 28.2 PG (ref 26.5–33)
MCH RBC QN AUTO: 28.3 PG (ref 26.5–33)
MCH RBC QN AUTO: 28.6 PG (ref 26.5–33)
MCH RBC QN AUTO: 28.7 PG (ref 26.5–33)
MCH RBC QN AUTO: 29.3 PG (ref 26.5–33)
MCH RBC QN AUTO: 30 PG (ref 26.5–33)
MCHC RBC AUTO-ENTMCNC: 31.3 G/DL (ref 31.5–36.5)
MCHC RBC AUTO-ENTMCNC: 31.7 G/DL (ref 31.5–36.5)
MCHC RBC AUTO-ENTMCNC: 32.2 G/DL (ref 31.5–36.5)
MCHC RBC AUTO-ENTMCNC: 32.3 G/DL (ref 31.5–36.5)
MCHC RBC AUTO-ENTMCNC: 32.4 G/DL (ref 31.5–36.5)
MCHC RBC AUTO-ENTMCNC: 32.6 G/DL (ref 31.5–36.5)
MCV RBC AUTO: 87 FL (ref 78–100)
MCV RBC AUTO: 89 FL (ref 78–100)
MCV RBC AUTO: 90 FL (ref 78–100)
MCV RBC AUTO: 90 FL (ref 78–100)
MCV RBC AUTO: 91 FL (ref 78–100)
MCV RBC AUTO: 92 FL (ref 78–100)
MONOCYTES # BLD AUTO: 0.6 10E9/L (ref 0–1.3)
MONOCYTES # BLD AUTO: 0.7 10E9/L (ref 0–1.3)
MONOCYTES # BLD AUTO: 0.8 10E9/L (ref 0–1.3)
MONOCYTES # BLD AUTO: 0.9 10E9/L (ref 0–1.3)
MONOCYTES NFR BLD AUTO: 10.5 %
MONOCYTES NFR BLD AUTO: 10.6 %
MONOCYTES NFR BLD AUTO: 6.6 %
MONOCYTES NFR BLD AUTO: 7.5 %
MUCOUS THREADS #/AREA URNS LPF: PRESENT /LPF
MUCOUS THREADS #/AREA URNS LPF: PRESENT /LPF
NEUTROPHILS # BLD AUTO: 10.4 10E9/L (ref 1.6–8.3)
NEUTROPHILS # BLD AUTO: 3.6 10E9/L (ref 1.6–8.3)
NEUTROPHILS # BLD AUTO: 5.3 10E9/L (ref 1.6–8.3)
NEUTROPHILS # BLD AUTO: 7.6 10E9/L (ref 1.6–8.3)
NEUTROPHILS NFR BLD AUTO: 60.7 %
NEUTROPHILS NFR BLD AUTO: 74.2 %
NEUTROPHILS NFR BLD AUTO: 79 %
NEUTROPHILS NFR BLD AUTO: 86.7 %
NITRATE UR QL: NEGATIVE
NRBC # BLD AUTO: 0 10*3/UL
NRBC BLD AUTO-RTO: 0 /100
NT-PROBNP SERPL-MCNC: 189 PG/ML (ref 0–1800)
O2/TOTAL GAS SETTING VFR VENT: ABNORMAL %
O2/TOTAL GAS SETTING VFR VENT: ABNORMAL %
OXYHGB MFR BLD: 84 % (ref 92–100)
OXYHGB MFR BLD: 95 % (ref 92–100)
PCO2 BLD: 44 MM HG (ref 35–45)
PCO2 BLD: 58 MM HG (ref 35–45)
PH BLD: 7.18 PH (ref 7.35–7.45)
PH BLD: 7.25 PH (ref 7.35–7.45)
PH UR STRIP: 5.5 PH (ref 4.7–8)
PH UR STRIP: 6 PH (ref 4.7–8)
PH UR STRIP: 6.5 PH (ref 4.7–8)
PLATELET # BLD AUTO: 157 10E9/L (ref 150–450)
PLATELET # BLD AUTO: 175 10E9/L (ref 150–450)
PLATELET # BLD AUTO: 200 10E9/L (ref 150–450)
PLATELET # BLD AUTO: 216 10E9/L (ref 150–450)
PLATELET # BLD AUTO: 223 10E9/L (ref 150–450)
PLATELET # BLD AUTO: 263 10E9/L (ref 150–450)
PO2 BLD: 62 MM HG (ref 80–105)
PO2 BLD: 86 MM HG (ref 80–105)
POTASSIUM SERPL-SCNC: 3 MMOL/L (ref 3.4–5.3)
POTASSIUM SERPL-SCNC: 3.4 MMOL/L (ref 3.4–5.3)
POTASSIUM SERPL-SCNC: 3.5 MMOL/L (ref 3.4–5.3)
POTASSIUM SERPL-SCNC: 3.6 MMOL/L (ref 3.4–5.3)
POTASSIUM SERPL-SCNC: 3.9 MEQ/L (ref 3.4–5.1)
POTASSIUM SERPL-SCNC: 3.9 MMOL/L (ref 3.4–5.3)
POTASSIUM SERPL-SCNC: 4.2 MMOL/L (ref 3.4–5.3)
PROCALCITONIN SERPL-MCNC: 12.98 NG/ML
PROCALCITONIN SERPL-MCNC: 18.95 NG/ML
PROT SERPL-MCNC: 6.4 G/DL (ref 6.8–8.8)
PROT SERPL-MCNC: 6.8 G/DL (ref 6.8–8.8)
PROT SERPL-MCNC: 7 G/DL (ref 6.8–8.8)
RBC # BLD AUTO: 3.86 10E12/L (ref 3.8–5.2)
RBC # BLD AUTO: 4.17 10E12/L (ref 3.8–5.2)
RBC # BLD AUTO: 4.37 10E12/L (ref 3.8–5.2)
RBC # BLD AUTO: 4.62 10E12/L (ref 3.8–5.2)
RBC # BLD AUTO: 4.84 10E12/L (ref 3.8–5.2)
RBC # BLD AUTO: 5.36 10E12/L (ref 3.8–5.2)
RBC #/AREA URNS AUTO: 2 /HPF (ref 0–2)
RBC #/AREA URNS AUTO: 7 /HPF (ref 0–2)
SODIUM SERPL-SCNC: 139 MMOL/L (ref 133–144)
SODIUM SERPL-SCNC: 143 MMOL/L (ref 133–144)
SODIUM SERPL-SCNC: 143 MMOL/L (ref 133–144)
SODIUM SERPL-SCNC: 144 MMOL/L (ref 133–144)
SODIUM SERPL-SCNC: 147 MMOL/L (ref 133–144)
SOURCE: ABNORMAL
SOURCE: ABNORMAL
SOURCE: NORMAL
SP GR UR STRIP: 1.01 (ref 1–1.03)
SP GR UR STRIP: 1.02 (ref 1–1.03)
SP GR UR STRIP: 1.02 (ref 1–1.03)
SPECIMEN SOURCE: ABNORMAL
SPECIMEN SOURCE: NORMAL
TROPONIN I SERPL-MCNC: <0.015 UG/L (ref 0–0.04)
UROBILINOGEN UR STRIP-MCNC: NORMAL MG/DL (ref 0–2)
WBC # BLD AUTO: 10.4 10E9/L (ref 4–11)
WBC # BLD AUTO: 13.1 10E9/L (ref 4–11)
WBC # BLD AUTO: 14.7 10E9/L (ref 4–11)
WBC # BLD AUTO: 6 10E9/L (ref 4–11)
WBC # BLD AUTO: 7.1 10E9/L (ref 4–11)
WBC # BLD AUTO: 8.8 10E9/L (ref 4–11)
WBC #/AREA URNS AUTO: 3 /HPF (ref 0–5)
WBC #/AREA URNS AUTO: 30 /HPF (ref 0–5)
YEAST #/AREA URNS HPF: ABNORMAL /HPF

## 2019-01-01 PROCEDURE — 25800030 ZZH RX IP 258 OP 636: Performed by: NURSE PRACTITIONER

## 2019-01-01 PROCEDURE — 83735 ASSAY OF MAGNESIUM: CPT | Performed by: INTERNAL MEDICINE

## 2019-01-01 PROCEDURE — 25000128 H RX IP 250 OP 636: Performed by: NURSE PRACTITIONER

## 2019-01-01 PROCEDURE — 36415 COLL VENOUS BLD VENIPUNCTURE: CPT | Performed by: PHYSICIAN ASSISTANT

## 2019-01-01 PROCEDURE — 86140 C-REACTIVE PROTEIN: CPT | Performed by: INTERNAL MEDICINE

## 2019-01-01 PROCEDURE — 99232 SBSQ HOSP IP/OBS MODERATE 35: CPT | Performed by: NURSE PRACTITIONER

## 2019-01-01 PROCEDURE — 40000786 ZZHCL STATISTIC ACTIVE MRSA SURVEILLANCE CULTURE: Performed by: INTERNAL MEDICINE

## 2019-01-01 PROCEDURE — 81001 URINALYSIS AUTO W/SCOPE: CPT | Performed by: INTERNAL MEDICINE

## 2019-01-01 PROCEDURE — 81003 URINALYSIS AUTO W/O SCOPE: CPT | Performed by: NURSE PRACTITIONER

## 2019-01-01 PROCEDURE — 84145 PROCALCITONIN (PCT): CPT | Performed by: NURSE PRACTITIONER

## 2019-01-01 PROCEDURE — G0378 HOSPITAL OBSERVATION PER HR: HCPCS

## 2019-01-01 PROCEDURE — 85025 COMPLETE CBC W/AUTO DIFF WBC: CPT | Performed by: PHYSICIAN ASSISTANT

## 2019-01-01 PROCEDURE — 80048 BASIC METABOLIC PNL TOTAL CA: CPT | Performed by: NURSE PRACTITIONER

## 2019-01-01 PROCEDURE — 99223 1ST HOSP IP/OBS HIGH 75: CPT | Performed by: INTERNAL MEDICINE

## 2019-01-01 PROCEDURE — 36415 COLL VENOUS BLD VENIPUNCTURE: CPT | Performed by: NURSE PRACTITIONER

## 2019-01-01 PROCEDURE — 25000125 ZZHC RX 250: Performed by: NURSE PRACTITIONER

## 2019-01-01 PROCEDURE — 83735 ASSAY OF MAGNESIUM: CPT | Performed by: NURSE PRACTITIONER

## 2019-01-01 PROCEDURE — 99232 SBSQ HOSP IP/OBS MODERATE 35: CPT | Performed by: INTERNAL MEDICINE

## 2019-01-01 PROCEDURE — 99225 ZZC SUBSEQUENT OBSERVATION CARE,LEVEL II: CPT | Performed by: SURGERY

## 2019-01-01 PROCEDURE — 40000275 ZZH STATISTIC RCP TIME EA 10 MIN

## 2019-01-01 PROCEDURE — 12000000 ZZH R&B MED SURG/OB

## 2019-01-01 PROCEDURE — 25000128 H RX IP 250 OP 636: Performed by: INTERNAL MEDICINE

## 2019-01-01 PROCEDURE — 83605 ASSAY OF LACTIC ACID: CPT | Performed by: PHYSICIAN ASSISTANT

## 2019-01-01 PROCEDURE — 36415 COLL VENOUS BLD VENIPUNCTURE: CPT | Performed by: INTERNAL MEDICINE

## 2019-01-01 PROCEDURE — 87040 BLOOD CULTURE FOR BACTERIA: CPT | Performed by: INTERNAL MEDICINE

## 2019-01-01 PROCEDURE — G0463 HOSPITAL OUTPT CLINIC VISIT: HCPCS

## 2019-01-01 PROCEDURE — 25000125 ZZHC RX 250

## 2019-01-01 PROCEDURE — 72170 X-RAY EXAM OF PELVIS: CPT | Mod: TC

## 2019-01-01 PROCEDURE — 25000125 ZZHC RX 250: Performed by: INTERNAL MEDICINE

## 2019-01-01 PROCEDURE — 71045 X-RAY EXAM CHEST 1 VIEW: CPT | Mod: TC

## 2019-01-01 PROCEDURE — 93010 ELECTROCARDIOGRAM REPORT: CPT | Performed by: INTERNAL MEDICINE

## 2019-01-01 PROCEDURE — 5A09357 ASSISTANCE WITH RESPIRATORY VENTILATION, LESS THAN 24 CONSECUTIVE HOURS, CONTINUOUS POSITIVE AIRWAY PRESSURE: ICD-10-PCS | Performed by: INTERNAL MEDICINE

## 2019-01-01 PROCEDURE — 99285 EMERGENCY DEPT VISIT HI MDM: CPT | Mod: 25

## 2019-01-01 PROCEDURE — 83605 ASSAY OF LACTIC ACID: CPT | Performed by: INTERNAL MEDICINE

## 2019-01-01 PROCEDURE — 94640 AIRWAY INHALATION TREATMENT: CPT

## 2019-01-01 PROCEDURE — 84132 ASSAY OF SERUM POTASSIUM: CPT | Performed by: NURSE PRACTITIONER

## 2019-01-01 PROCEDURE — 25000132 ZZH RX MED GY IP 250 OP 250 PS 637: Mod: GY | Performed by: INTERNAL MEDICINE

## 2019-01-01 PROCEDURE — 25000132 ZZH RX MED GY IP 250 OP 250 PS 637: Mod: GY | Performed by: NURSE PRACTITIONER

## 2019-01-01 PROCEDURE — 84145 PROCALCITONIN (PCT): CPT | Performed by: INTERNAL MEDICINE

## 2019-01-01 PROCEDURE — 99214 OFFICE O/P EST MOD 30 MIN: CPT | Performed by: FAMILY MEDICINE

## 2019-01-01 PROCEDURE — 99214 OFFICE O/P EST MOD 30 MIN: CPT | Performed by: INTERNAL MEDICINE

## 2019-01-01 PROCEDURE — 96375 TX/PRO/DX INJ NEW DRUG ADDON: CPT

## 2019-01-01 PROCEDURE — 99238 HOSP IP/OBS DSCHRG MGMT 30/<: CPT | Performed by: INTERNAL MEDICINE

## 2019-01-01 PROCEDURE — 80053 COMPREHEN METABOLIC PANEL: CPT | Performed by: NURSE PRACTITIONER

## 2019-01-01 PROCEDURE — 81001 URINALYSIS AUTO W/SCOPE: CPT | Performed by: PHYSICIAN ASSISTANT

## 2019-01-01 PROCEDURE — 83880 ASSAY OF NATRIURETIC PEPTIDE: CPT | Performed by: INTERNAL MEDICINE

## 2019-01-01 PROCEDURE — 85027 COMPLETE CBC AUTOMATED: CPT | Performed by: INTERNAL MEDICINE

## 2019-01-01 PROCEDURE — 87086 URINE CULTURE/COLONY COUNT: CPT | Performed by: PHYSICIAN ASSISTANT

## 2019-01-01 PROCEDURE — 85025 COMPLETE CBC W/AUTO DIFF WBC: CPT | Performed by: NURSE PRACTITIONER

## 2019-01-01 PROCEDURE — 20000003 ZZH R&B ICU

## 2019-01-01 PROCEDURE — 25800030 ZZH RX IP 258 OP 636: Performed by: INTERNAL MEDICINE

## 2019-01-01 PROCEDURE — 71045 X-RAY EXAM CHEST 1 VIEW: CPT | Mod: TC,77

## 2019-01-01 PROCEDURE — 82805 BLOOD GASES W/O2 SATURATION: CPT | Performed by: INTERNAL MEDICINE

## 2019-01-01 PROCEDURE — 80053 COMPREHEN METABOLIC PANEL: CPT | Performed by: PHYSICIAN ASSISTANT

## 2019-01-01 PROCEDURE — 36600 WITHDRAWAL OF ARTERIAL BLOOD: CPT

## 2019-01-01 PROCEDURE — 99223 1ST HOSP IP/OBS HIGH 75: CPT | Mod: AI | Performed by: INTERNAL MEDICINE

## 2019-01-01 PROCEDURE — 90471 IMMUNIZATION ADMIN: CPT

## 2019-01-01 PROCEDURE — 80048 BASIC METABOLIC PNL TOTAL CA: CPT | Performed by: INTERNAL MEDICINE

## 2019-01-01 PROCEDURE — 99308 SBSQ NF CARE LOW MDM 20: CPT | Performed by: FAMILY MEDICINE

## 2019-01-01 PROCEDURE — 70450 CT HEAD/BRAIN W/O DYE: CPT | Mod: TC

## 2019-01-01 PROCEDURE — 80053 COMPREHEN METABOLIC PANEL: CPT | Performed by: INTERNAL MEDICINE

## 2019-01-01 PROCEDURE — 87106 FUNGI IDENTIFICATION YEAST: CPT | Performed by: PHYSICIAN ASSISTANT

## 2019-01-01 PROCEDURE — 96365 THER/PROPH/DIAG IV INF INIT: CPT

## 2019-01-01 PROCEDURE — 99220 ZZC INITIAL OBSERVATION CARE,LEVL III: CPT | Performed by: INTERNAL MEDICINE

## 2019-01-01 PROCEDURE — 99304 1ST NF CARE SF/LOW MDM 25: CPT | Performed by: FAMILY MEDICINE

## 2019-01-01 PROCEDURE — 94660 CPAP INITIATION&MGMT: CPT

## 2019-01-01 PROCEDURE — 92610 EVALUATE SWALLOWING FUNCTION: CPT | Mod: GN

## 2019-01-01 PROCEDURE — 25000132 ZZH RX MED GY IP 250 OP 250 PS 637: Performed by: PHYSICIAN ASSISTANT

## 2019-01-01 PROCEDURE — 99309 SBSQ NF CARE MODERATE MDM 30: CPT | Performed by: NURSE PRACTITIONER

## 2019-01-01 PROCEDURE — 85025 COMPLETE CBC W/AUTO DIFF WBC: CPT | Performed by: INTERNAL MEDICINE

## 2019-01-01 PROCEDURE — 93005 ELECTROCARDIOGRAM TRACING: CPT

## 2019-01-01 PROCEDURE — A9270 NON-COVERED ITEM OR SERVICE: HCPCS | Performed by: PHYSICIAN ASSISTANT

## 2019-01-01 PROCEDURE — 99284 EMERGENCY DEPT VISIT MOD MDM: CPT | Mod: 25

## 2019-01-01 PROCEDURE — 83605 ASSAY OF LACTIC ACID: CPT | Performed by: NURSE PRACTITIONER

## 2019-01-01 PROCEDURE — 99233 SBSQ HOSP IP/OBS HIGH 50: CPT | Performed by: INTERNAL MEDICINE

## 2019-01-01 PROCEDURE — 72125 CT NECK SPINE W/O DYE: CPT | Mod: TC

## 2019-01-01 PROCEDURE — 99217 ZZC OBSERVATION CARE DISCHARGE: CPT | Performed by: NURSE PRACTITIONER

## 2019-01-01 PROCEDURE — 90714 TD VACC NO PRESV 7 YRS+ IM: CPT | Performed by: NURSE PRACTITIONER

## 2019-01-01 PROCEDURE — 99284 EMERGENCY DEPT VISIT MOD MDM: CPT | Mod: Z6 | Performed by: NURSE PRACTITIONER

## 2019-01-01 PROCEDURE — 85027 COMPLETE CBC AUTOMATED: CPT | Performed by: NURSE PRACTITIONER

## 2019-01-01 PROCEDURE — 99285 EMERGENCY DEPT VISIT HI MDM: CPT | Mod: Z6 | Performed by: INTERNAL MEDICINE

## 2019-01-01 PROCEDURE — 84484 ASSAY OF TROPONIN QUANT: CPT | Performed by: INTERNAL MEDICINE

## 2019-01-01 PROCEDURE — 70450 CT HEAD/BRAIN W/O DYE: CPT | Mod: TC,77

## 2019-01-01 PROCEDURE — 99284 EMERGENCY DEPT VISIT MOD MDM: CPT | Mod: Z6 | Performed by: PHYSICIAN ASSISTANT

## 2019-01-01 RX ORDER — MORPHINE SULFATE 2 MG/ML
1-2 INJECTION, SOLUTION INTRAMUSCULAR; INTRAVENOUS
Status: DISCONTINUED | OUTPATIENT
Start: 2019-01-01 | End: 2019-01-01 | Stop reason: HOSPADM

## 2019-01-01 RX ORDER — CALCIUM CARBONATE 500 MG/1
1000 TABLET, CHEWABLE ORAL 4 TIMES DAILY PRN
Status: DISCONTINUED | OUTPATIENT
Start: 2019-01-01 | End: 2019-01-01 | Stop reason: HOSPADM

## 2019-01-01 RX ORDER — POTASSIUM CHLORIDE 7.45 MG/ML
10 INJECTION INTRAVENOUS
Status: DISCONTINUED | OUTPATIENT
Start: 2019-01-01 | End: 2019-01-01

## 2019-01-01 RX ORDER — AMLODIPINE BESYLATE 5 MG/1
5 TABLET ORAL AT BEDTIME
Status: DISCONTINUED | OUTPATIENT
Start: 2019-01-01 | End: 2019-01-01 | Stop reason: HOSPADM

## 2019-01-01 RX ORDER — ONDANSETRON 4 MG/1
4 TABLET, ORALLY DISINTEGRATING ORAL EVERY 6 HOURS PRN
Status: DISCONTINUED | OUTPATIENT
Start: 2019-01-01 | End: 2019-01-01 | Stop reason: HOSPADM

## 2019-01-01 RX ORDER — ACETAZOLAMIDE 250 MG/1
250 TABLET ORAL 2 TIMES DAILY
Status: DISCONTINUED | OUTPATIENT
Start: 2019-01-01 | End: 2019-01-01 | Stop reason: HOSPADM

## 2019-01-01 RX ORDER — LORAZEPAM 0.5 MG/1
.5-1 TABLET ORAL
Status: DISCONTINUED | OUTPATIENT
Start: 2019-01-01 | End: 2019-01-01 | Stop reason: HOSPADM

## 2019-01-01 RX ORDER — MORPHINE SULFATE 2 MG/ML
1-2 INJECTION, SOLUTION INTRAMUSCULAR; INTRAVENOUS
Status: DISCONTINUED | OUTPATIENT
Start: 2019-01-01 | End: 2019-01-01

## 2019-01-01 RX ORDER — QUETIAPINE FUMARATE 25 MG/1
25 TABLET, FILM COATED ORAL AT BEDTIME
Status: DISCONTINUED | OUTPATIENT
Start: 2019-01-01 | End: 2019-01-01

## 2019-01-01 RX ORDER — FLUCONAZOLE 200 MG/1
200 TABLET ORAL DAILY
Qty: 14 TABLET | Refills: 0 | Status: SHIPPED | OUTPATIENT
Start: 2019-01-01 | End: 2019-01-01

## 2019-01-01 RX ORDER — FLUCONAZOLE 200 MG/1
200 TABLET ORAL DAILY
Qty: 7 TABLET | Refills: 0 | Status: SHIPPED | OUTPATIENT
Start: 2019-01-01 | End: 2019-01-01

## 2019-01-01 RX ORDER — CEFTRIAXONE SODIUM 1 G
1 VIAL (EA) INJECTION EVERY 24 HOURS
Status: DISCONTINUED | OUTPATIENT
Start: 2019-01-01 | End: 2019-01-01

## 2019-01-01 RX ORDER — MAGNESIUM SULFATE HEPTAHYDRATE 40 MG/ML
2 INJECTION, SOLUTION INTRAVENOUS DAILY PRN
Status: DISCONTINUED | OUTPATIENT
Start: 2019-01-01 | End: 2019-01-01

## 2019-01-01 RX ORDER — FUROSEMIDE 10 MG/ML
40 INJECTION INTRAMUSCULAR; INTRAVENOUS ONCE
Status: COMPLETED | OUTPATIENT
Start: 2019-01-01 | End: 2019-01-01

## 2019-01-01 RX ORDER — SERTRALINE HYDROCHLORIDE 25 MG/1
25 TABLET, FILM COATED ORAL DAILY
Status: DISCONTINUED | OUTPATIENT
Start: 2019-01-01 | End: 2019-01-01

## 2019-01-01 RX ORDER — TIMOLOL MALEATE 5 MG/ML
1 SOLUTION/ DROPS OPHTHALMIC 2 TIMES DAILY
Status: DISCONTINUED | OUTPATIENT
Start: 2019-01-01 | End: 2019-01-01

## 2019-01-01 RX ORDER — FAMOTIDINE 20 MG/1
20 TABLET, FILM COATED ORAL DAILY
Status: DISCONTINUED | OUTPATIENT
Start: 2019-01-01 | End: 2019-01-01

## 2019-01-01 RX ORDER — DEXTROSE MONOHYDRATE, SODIUM CHLORIDE, AND POTASSIUM CHLORIDE 50; .745; 4.5 G/1000ML; G/1000ML; G/1000ML
INJECTION, SOLUTION INTRAVENOUS CONTINUOUS
Status: DISCONTINUED | OUTPATIENT
Start: 2019-01-01 | End: 2019-01-01

## 2019-01-01 RX ORDER — NALOXONE HYDROCHLORIDE 0.4 MG/ML
.1-.4 INJECTION, SOLUTION INTRAMUSCULAR; INTRAVENOUS; SUBCUTANEOUS
Status: DISCONTINUED | OUTPATIENT
Start: 2019-01-01 | End: 2019-01-01 | Stop reason: HOSPADM

## 2019-01-01 RX ORDER — BRIMONIDINE TARTRATE AND TIMOLOL MALEATE 2; 5 MG/ML; MG/ML
1 SOLUTION OPHTHALMIC 2 TIMES DAILY
Status: DISCONTINUED | OUTPATIENT
Start: 2019-01-01 | End: 2019-01-01

## 2019-01-01 RX ORDER — PREDNISOLONE ACETATE 10 MG/ML
1 SUSPENSION/ DROPS OPHTHALMIC DAILY
Status: DISCONTINUED | OUTPATIENT
Start: 2019-01-01 | End: 2019-01-01 | Stop reason: CLARIF

## 2019-01-01 RX ORDER — BRINZOLAMIDE/BRIMONIDINE TARTRATE 10; 2 MG/ML; MG/ML
SUSPENSION/ DROPS OPHTHALMIC
Qty: 8 ML | Refills: 3 | Status: SHIPPED | OUTPATIENT
Start: 2019-01-01

## 2019-01-01 RX ORDER — LORAZEPAM 2 MG/ML
.5-1 INJECTION INTRAMUSCULAR
Status: DISCONTINUED | OUTPATIENT
Start: 2019-01-01 | End: 2019-01-01 | Stop reason: HOSPADM

## 2019-01-01 RX ORDER — ASPIRIN 81 MG/1
81 TABLET ORAL AT BEDTIME
Status: DISCONTINUED | OUTPATIENT
Start: 2019-01-01 | End: 2019-01-01 | Stop reason: HOSPADM

## 2019-01-01 RX ORDER — FUROSEMIDE 10 MG/ML
INJECTION INTRAMUSCULAR; INTRAVENOUS
Status: DISPENSED
Start: 2019-01-01 | End: 2019-01-01

## 2019-01-01 RX ORDER — METHYLPREDNISOLONE SODIUM SUCCINATE 125 MG/2ML
60 INJECTION, POWDER, LYOPHILIZED, FOR SOLUTION INTRAMUSCULAR; INTRAVENOUS EVERY 6 HOURS
Status: DISCONTINUED | OUTPATIENT
Start: 2019-01-01 | End: 2019-01-01

## 2019-01-01 RX ORDER — TIMOLOL MALEATE 5 MG/ML
SOLUTION/ DROPS OPHTHALMIC
Qty: 5 ML | Refills: 3 | Status: SHIPPED | OUTPATIENT
Start: 2019-01-01

## 2019-01-01 RX ORDER — MAGNESIUM SULFATE HEPTAHYDRATE 40 MG/ML
4 INJECTION, SOLUTION INTRAVENOUS EVERY 4 HOURS PRN
Status: DISCONTINUED | OUTPATIENT
Start: 2019-01-01 | End: 2019-01-01

## 2019-01-01 RX ORDER — QUETIAPINE FUMARATE 25 MG/1
25 TABLET, FILM COATED ORAL DAILY
COMMUNITY

## 2019-01-01 RX ORDER — DEXTROSE MONOHYDRATE, SODIUM CHLORIDE, AND POTASSIUM CHLORIDE 50; 1.49; 4.5 G/1000ML; G/1000ML; G/1000ML
INJECTION, SOLUTION INTRAVENOUS CONTINUOUS
Status: DISCONTINUED | OUTPATIENT
Start: 2019-01-01 | End: 2019-01-01

## 2019-01-01 RX ORDER — BRINZOLAMIDE/BRIMONIDINE TARTRATE 10; 2 MG/ML; MG/ML
SUSPENSION/ DROPS OPHTHALMIC
Qty: 8 ML | Refills: 3 | Status: SHIPPED | OUTPATIENT
Start: 2019-01-01 | End: 2019-01-01

## 2019-01-01 RX ORDER — SERTRALINE HYDROCHLORIDE 25 MG/1
25 TABLET, FILM COATED ORAL DAILY
COMMUNITY

## 2019-01-01 RX ORDER — HALOPERIDOL 5 MG/ML
1 INJECTION INTRAMUSCULAR EVERY 6 HOURS PRN
Status: COMPLETED | OUTPATIENT
Start: 2019-01-01 | End: 2019-01-01

## 2019-01-01 RX ORDER — AMOXICILLIN 250 MG
1 CAPSULE ORAL DAILY
Status: DISCONTINUED | OUTPATIENT
Start: 2019-01-01 | End: 2019-01-01

## 2019-01-01 RX ORDER — SODIUM CHLORIDE 9 MG/ML
INJECTION, SOLUTION INTRAVENOUS CONTINUOUS
Status: DISCONTINUED | OUTPATIENT
Start: 2019-01-01 | End: 2019-01-01

## 2019-01-01 RX ORDER — LATANOPROST 50 UG/ML
1 SOLUTION/ DROPS OPHTHALMIC AT BEDTIME
Status: DISCONTINUED | OUTPATIENT
Start: 2019-01-01 | End: 2019-01-01

## 2019-01-01 RX ORDER — TETANUS AND DIPHTHERIA TOXOIDS ADSORBED 2; 2 [LF]/.5ML; [LF]/.5ML
0.5 INJECTION INTRAMUSCULAR ONCE
Status: COMPLETED | OUTPATIENT
Start: 2019-01-01 | End: 2019-01-01

## 2019-01-01 RX ORDER — POTASSIUM CHLORIDE 1500 MG/1
20-40 TABLET, EXTENDED RELEASE ORAL
Status: DISCONTINUED | OUTPATIENT
Start: 2019-01-01 | End: 2019-01-01

## 2019-01-01 RX ORDER — LOSARTAN POTASSIUM 100 MG/1
100 TABLET ORAL DAILY
Qty: 93 TABLET | Refills: 3 | Status: SHIPPED | OUTPATIENT
Start: 2019-01-01

## 2019-01-01 RX ORDER — ACETAZOLAMIDE 250 MG/1
250 TABLET ORAL 2 TIMES DAILY
Qty: 60 TABLET | Refills: 5 | Status: SHIPPED | OUTPATIENT
Start: 2019-01-01

## 2019-01-01 RX ORDER — ASPIRIN 81 MG/1
81 TABLET ORAL DAILY
COMMUNITY

## 2019-01-01 RX ORDER — CEFTRIAXONE SODIUM 1 G/50ML
1 INJECTION, SOLUTION INTRAVENOUS EVERY 24 HOURS
Status: DISCONTINUED | OUTPATIENT
Start: 2019-01-01 | End: 2019-01-01 | Stop reason: RX

## 2019-01-01 RX ORDER — LOSARTAN POTASSIUM 50 MG/1
100 TABLET ORAL DAILY
Status: DISCONTINUED | OUTPATIENT
Start: 2019-01-01 | End: 2019-01-01 | Stop reason: HOSPADM

## 2019-01-01 RX ORDER — POTASSIUM CHLORIDE 1.5 G/1.58G
20-40 POWDER, FOR SOLUTION ORAL
Status: DISCONTINUED | OUTPATIENT
Start: 2019-01-01 | End: 2019-01-01

## 2019-01-01 RX ORDER — NITROFURANTOIN 25; 75 MG/1; MG/1
100 CAPSULE ORAL 2 TIMES DAILY
Qty: 13 CAPSULE | Refills: 0 | Status: SHIPPED | OUTPATIENT
Start: 2019-01-01 | End: 2019-01-01

## 2019-01-01 RX ORDER — ALBUTEROL SULFATE 0.83 MG/ML
2.5 SOLUTION RESPIRATORY (INHALATION) EVERY 4 HOURS PRN
Status: DISCONTINUED | OUTPATIENT
Start: 2019-01-01 | End: 2019-01-01

## 2019-01-01 RX ORDER — POTASSIUM CL/LIDO/0.9 % NACL 10MEQ/0.1L
10 INTRAVENOUS SOLUTION, PIGGYBACK (ML) INTRAVENOUS
Status: DISCONTINUED | OUTPATIENT
Start: 2019-01-01 | End: 2019-01-01

## 2019-01-01 RX ORDER — PREDNISOLONE ACETATE 10 MG/ML
1 SUSPENSION/ DROPS OPHTHALMIC DAILY
Status: DISCONTINUED | OUTPATIENT
Start: 2019-01-01 | End: 2019-01-01

## 2019-01-01 RX ORDER — ASPIRIN 81 MG/1
81 TABLET ORAL DAILY
Status: DISCONTINUED | OUTPATIENT
Start: 2019-01-01 | End: 2019-01-01

## 2019-01-01 RX ORDER — NITROFURANTOIN 25; 75 MG/1; MG/1
100 CAPSULE ORAL ONCE
Status: COMPLETED | OUTPATIENT
Start: 2019-01-01 | End: 2019-01-01

## 2019-01-01 RX ORDER — ACETAMINOPHEN 500 MG
500 TABLET ORAL 4 TIMES DAILY
Status: DISCONTINUED | OUTPATIENT
Start: 2019-01-01 | End: 2019-01-01

## 2019-01-01 RX ORDER — ATROPINE SULFATE 10 MG/ML
1-2 SOLUTION/ DROPS OPHTHALMIC
Status: DISCONTINUED | OUTPATIENT
Start: 2019-01-01 | End: 2019-01-01 | Stop reason: HOSPADM

## 2019-01-01 RX ORDER — CALCIUM CARBONATE 500 MG/1
2 TABLET, CHEWABLE ORAL 4 TIMES DAILY PRN
COMMUNITY
End: 2019-01-01

## 2019-01-01 RX ORDER — VANCOMYCIN HYDROCHLORIDE 1 G/200ML
1000 INJECTION, SOLUTION INTRAVENOUS ONCE
Status: COMPLETED | OUTPATIENT
Start: 2019-01-01 | End: 2019-01-01

## 2019-01-01 RX ORDER — ACETAMINOPHEN 650 MG/1
650 SUPPOSITORY RECTAL EVERY 6 HOURS PRN
Status: DISCONTINUED | OUTPATIENT
Start: 2019-01-01 | End: 2019-01-01 | Stop reason: HOSPADM

## 2019-01-01 RX ORDER — FAMOTIDINE 20 MG/1
20 TABLET, FILM COATED ORAL 2 TIMES DAILY
Status: DISCONTINUED | OUTPATIENT
Start: 2019-01-01 | End: 2019-01-01

## 2019-01-01 RX ORDER — BISACODYL 10 MG
10 SUPPOSITORY, RECTAL RECTAL
Status: DISCONTINUED | OUTPATIENT
Start: 2019-11-23 | End: 2019-01-01 | Stop reason: HOSPADM

## 2019-01-01 RX ORDER — SERTRALINE HYDROCHLORIDE 25 MG/1
25 TABLET, FILM COATED ORAL DAILY
Status: DISCONTINUED | OUTPATIENT
Start: 2019-01-01 | End: 2019-01-01 | Stop reason: HOSPADM

## 2019-01-01 RX ORDER — SCOLOPAMINE TRANSDERMAL SYSTEM 1 MG/1
1 PATCH, EXTENDED RELEASE TRANSDERMAL
Status: DISCONTINUED | OUTPATIENT
Start: 2019-01-01 | End: 2019-01-01

## 2019-01-01 RX ORDER — SALIVA STIMULANT COMB. NO.3
2 SPRAY, NON-AEROSOL (ML) MUCOUS MEMBRANE
Status: DISCONTINUED | OUTPATIENT
Start: 2019-01-01 | End: 2019-01-01 | Stop reason: HOSPADM

## 2019-01-01 RX ORDER — ONDANSETRON 2 MG/ML
4 INJECTION INTRAMUSCULAR; INTRAVENOUS EVERY 6 HOURS PRN
Status: DISCONTINUED | OUTPATIENT
Start: 2019-01-01 | End: 2019-01-01 | Stop reason: HOSPADM

## 2019-01-01 RX ORDER — ACETAMINOPHEN 650 MG/1
650 SUPPOSITORY RECTAL EVERY 4 HOURS PRN
Status: DISCONTINUED | OUTPATIENT
Start: 2019-01-01 | End: 2019-01-01 | Stop reason: HOSPADM

## 2019-01-01 RX ORDER — AMOXICILLIN 250 MG
1 CAPSULE ORAL DAILY
Status: DISCONTINUED | OUTPATIENT
Start: 2019-01-01 | End: 2019-01-01 | Stop reason: HOSPADM

## 2019-01-01 RX ORDER — SCOLOPAMINE TRANSDERMAL SYSTEM 1 MG/1
PATCH, EXTENDED RELEASE TRANSDERMAL
Status: COMPLETED
Start: 2019-01-01 | End: 2019-01-01

## 2019-01-01 RX ORDER — IPRATROPIUM BROMIDE AND ALBUTEROL SULFATE 2.5; .5 MG/3ML; MG/3ML
3 SOLUTION RESPIRATORY (INHALATION) ONCE
Status: COMPLETED | OUTPATIENT
Start: 2019-01-01 | End: 2019-01-01

## 2019-01-01 RX ORDER — ACETAMINOPHEN 325 MG/1
650 TABLET ORAL EVERY 4 HOURS PRN
Status: DISCONTINUED | OUTPATIENT
Start: 2019-01-01 | End: 2019-01-01 | Stop reason: HOSPADM

## 2019-01-01 RX ORDER — MINERAL OIL/HYDROPHIL PETROLAT
OINTMENT (GRAM) TOPICAL EVERY 8 HOURS PRN
Status: DISCONTINUED | OUTPATIENT
Start: 2019-01-01 | End: 2019-01-01 | Stop reason: HOSPADM

## 2019-01-01 RX ORDER — AMLODIPINE BESYLATE 5 MG/1
5 TABLET ORAL AT BEDTIME
Qty: 90 TABLET | Refills: 1 | Status: SHIPPED | OUTPATIENT
Start: 2019-01-01

## 2019-01-01 RX ADMIN — MAGNESIUM SULFATE IN WATER 2 G: 40 INJECTION, SOLUTION INTRAVENOUS at 14:03

## 2019-01-01 RX ADMIN — DOXYCYCLINE 100 MG: 100 INJECTION, POWDER, LYOPHILIZED, FOR SOLUTION INTRAVENOUS at 09:02

## 2019-01-01 RX ADMIN — Medication 10 MEQ: at 09:01

## 2019-01-01 RX ADMIN — CEFTRIAXONE SODIUM 1 G: 1 INJECTION, SOLUTION INTRAVENOUS at 08:57

## 2019-01-01 RX ADMIN — BRINZOLAMIDE/BRIMONIDINE TARTRATE 1 DROP: 10; 2 SUSPENSION/ DROPS OPHTHALMIC at 21:40

## 2019-01-01 RX ADMIN — TIMOLOL MALEATE 1 DROP: 5 SOLUTION OPHTHALMIC at 21:46

## 2019-01-01 RX ADMIN — IPRATROPIUM BROMIDE AND ALBUTEROL SULFATE 3 ML: .5; 3 SOLUTION RESPIRATORY (INHALATION) at 03:14

## 2019-01-01 RX ADMIN — LORAZEPAM 0.5 MG: 2 INJECTION, SOLUTION INTRAMUSCULAR; INTRAVENOUS at 11:48

## 2019-01-01 RX ADMIN — MORPHINE SULFATE 1 MG: 2 INJECTION, SOLUTION INTRAMUSCULAR; INTRAVENOUS at 05:33

## 2019-01-01 RX ADMIN — HALOPERIDOL LACTATE 1 MG: 5 INJECTION, SOLUTION INTRAMUSCULAR at 17:00

## 2019-01-01 RX ADMIN — BRINZOLAMIDE/BRIMONIDINE TARTRATE 1 DROP: 10; 2 SUSPENSION/ DROPS OPHTHALMIC at 09:09

## 2019-01-01 RX ADMIN — DOXYCYCLINE 100 MG: 100 INJECTION, POWDER, LYOPHILIZED, FOR SOLUTION INTRAVENOUS at 10:21

## 2019-01-01 RX ADMIN — ACETAZOLAMIDE 250 MG: 250 TABLET ORAL at 20:43

## 2019-01-01 RX ADMIN — RANITIDINE 150 MG: 150 TABLET ORAL at 20:42

## 2019-01-01 RX ADMIN — LATANOPROST 1 DROP: 50 SOLUTION OPHTHALMIC at 22:29

## 2019-01-01 RX ADMIN — SODIUM CHLORIDE: 9 INJECTION, SOLUTION INTRAVENOUS at 04:03

## 2019-01-01 RX ADMIN — BRINZOLAMIDE/BRIMONIDINE TARTRATE 1 DROP: 10; 2 SUSPENSION/ DROPS OPHTHALMIC at 13:23

## 2019-01-01 RX ADMIN — AMPICILLIN SODIUM AND SULBACTAM SODIUM 3 G: 2; 1 INJECTION, POWDER, FOR SOLUTION INTRAMUSCULAR; INTRAVENOUS at 00:13

## 2019-01-01 RX ADMIN — BRINZOLAMIDE/BRIMONIDINE TARTRATE 1 DROP: 10; 2 SUSPENSION/ DROPS OPHTHALMIC at 14:02

## 2019-01-01 RX ADMIN — MAGNESIUM SULFATE IN WATER 2 G: 40 INJECTION, SOLUTION INTRAVENOUS at 13:23

## 2019-01-01 RX ADMIN — SCOPALAMINE 1 PATCH: 1 PATCH, EXTENDED RELEASE TRANSDERMAL at 02:24

## 2019-01-01 RX ADMIN — Medication 3 MG: at 22:15

## 2019-01-01 RX ADMIN — BRINZOLAMIDE/BRIMONIDINE TARTRATE 1 DROP: 10; 2 SUSPENSION/ DROPS OPHTHALMIC at 09:14

## 2019-01-01 RX ADMIN — AMLODIPINE BESYLATE 5 MG: 5 TABLET ORAL at 20:43

## 2019-01-01 RX ADMIN — POTASSIUM CHLORIDE, DEXTROSE MONOHYDRATE AND SODIUM CHLORIDE: 150; 5; 450 INJECTION, SOLUTION INTRAVENOUS at 10:11

## 2019-01-01 RX ADMIN — BRINZOLAMIDE/BRIMONIDINE TARTRATE 1 DROP: 10; 2 SUSPENSION/ DROPS OPHTHALMIC at 13:24

## 2019-01-01 RX ADMIN — SERTRALINE HYDROCHLORIDE 25 MG: 25 TABLET, FILM COATED ORAL at 09:08

## 2019-01-01 RX ADMIN — Medication 10 MEQ: at 11:57

## 2019-01-01 RX ADMIN — ACETAMINOPHEN 650 MG: 325 TABLET, FILM COATED ORAL at 20:43

## 2019-01-01 RX ADMIN — RANITIDINE 150 MG: 150 TABLET ORAL at 09:09

## 2019-01-01 RX ADMIN — TIMOLOL MALEATE 1 DROP: 5 SOLUTION OPHTHALMIC at 12:55

## 2019-01-01 RX ADMIN — Medication 3 MG: at 20:42

## 2019-01-01 RX ADMIN — NITROFURANTOIN (MONOHYDRATE/MACROCRYSTALS) 100 MG: 75; 25 CAPSULE ORAL at 22:48

## 2019-01-01 RX ADMIN — LOSARTAN POTASSIUM 100 MG: 50 TABLET, FILM COATED ORAL at 09:09

## 2019-01-01 RX ADMIN — Medication 10 MEQ: at 10:24

## 2019-01-01 RX ADMIN — SODIUM CHLORIDE: 9 INJECTION, SOLUTION INTRAVENOUS at 07:51

## 2019-01-01 RX ADMIN — MORPHINE SULFATE 1 MG: 2 INJECTION, SOLUTION INTRAMUSCULAR; INTRAVENOUS at 08:38

## 2019-01-01 RX ADMIN — TIMOLOL MALEATE 1 DROP: 5 SOLUTION OPHTHALMIC at 21:28

## 2019-01-01 RX ADMIN — ENOXAPARIN SODIUM 40 MG: 40 INJECTION SUBCUTANEOUS at 07:57

## 2019-01-01 RX ADMIN — CEFTRIAXONE SODIUM 1 G: 1 INJECTION, SOLUTION INTRAVENOUS at 06:50

## 2019-01-01 RX ADMIN — ACETAMINOPHEN 650 MG: 650 SUPPOSITORY RECTAL at 11:55

## 2019-01-01 RX ADMIN — HALOPERIDOL LACTATE 1 MG: 5 INJECTION, SOLUTION INTRAMUSCULAR at 23:20

## 2019-01-01 RX ADMIN — BRINZOLAMIDE/BRIMONIDINE TARTRATE 1 DROP: 10; 2 SUSPENSION/ DROPS OPHTHALMIC at 21:27

## 2019-01-01 RX ADMIN — BRINZOLAMIDE/BRIMONIDINE TARTRATE 1 DROP: 10; 2 SUSPENSION/ DROPS OPHTHALMIC at 15:05

## 2019-01-01 RX ADMIN — FAMOTIDINE 20 MG: 20 TABLET, FILM COATED ORAL at 09:05

## 2019-01-01 RX ADMIN — MORPHINE SULFATE 1 MG: 2 INJECTION, SOLUTION INTRAMUSCULAR; INTRAVENOUS at 02:59

## 2019-01-01 RX ADMIN — LATANOPROST 1 DROP: 50 SOLUTION OPHTHALMIC at 22:43

## 2019-01-01 RX ADMIN — AMPICILLIN SODIUM AND SULBACTAM SODIUM 3 G: 2; 1 INJECTION, POWDER, FOR SOLUTION INTRAMUSCULAR; INTRAVENOUS at 12:56

## 2019-01-01 RX ADMIN — TIMOLOL MALEATE 1 DROP: 5 SOLUTION OPHTHALMIC at 09:58

## 2019-01-01 RX ADMIN — DOXYCYCLINE 100 MG: 100 INJECTION, POWDER, LYOPHILIZED, FOR SOLUTION INTRAVENOUS at 20:52

## 2019-01-01 RX ADMIN — Medication 10 MEQ: at 14:52

## 2019-01-01 RX ADMIN — ACETAZOLAMIDE 250 MG: 250 TABLET ORAL at 09:08

## 2019-01-01 RX ADMIN — CEFTRIAXONE SODIUM 1 G: 1 INJECTION, SOLUTION INTRAVENOUS at 06:24

## 2019-01-01 RX ADMIN — QUETIAPINE FUMARATE 25 MG: 25 TABLET ORAL at 22:15

## 2019-01-01 RX ADMIN — AMPICILLIN SODIUM AND SULBACTAM SODIUM 3 G: 2; 1 INJECTION, POWDER, FOR SOLUTION INTRAMUSCULAR; INTRAVENOUS at 06:04

## 2019-01-01 RX ADMIN — LATANOPROST 1 DROP: 50 SOLUTION OPHTHALMIC at 21:55

## 2019-01-01 RX ADMIN — ACETAMINOPHEN 650 MG: 650 SUPPOSITORY RECTAL at 17:54

## 2019-01-01 RX ADMIN — SENNOSIDES AND DOCUSATE SODIUM 1 TABLET: 8.6; 5 TABLET ORAL at 09:09

## 2019-01-01 RX ADMIN — TETANUS AND DIPHTHERIA TOXOIDS ADSORBED 0.5 ML: 2; 2 INJECTION INTRAMUSCULAR at 13:21

## 2019-01-01 RX ADMIN — DOXYCYCLINE 100 MG: 100 INJECTION, POWDER, LYOPHILIZED, FOR SOLUTION INTRAVENOUS at 21:30

## 2019-01-01 RX ADMIN — BRINZOLAMIDE/BRIMONIDINE TARTRATE 1 DROP: 10; 2 SUSPENSION/ DROPS OPHTHALMIC at 14:24

## 2019-01-01 RX ADMIN — DOXYCYCLINE 100 MG: 100 INJECTION, POWDER, LYOPHILIZED, FOR SOLUTION INTRAVENOUS at 08:39

## 2019-01-01 RX ADMIN — TIMOLOL MALEATE 1 DROP: 5 SOLUTION OPHTHALMIC at 08:50

## 2019-01-01 RX ADMIN — FUROSEMIDE 40 MG: 10 INJECTION, SOLUTION INTRAMUSCULAR; INTRAVENOUS at 20:34

## 2019-01-01 RX ADMIN — SCOPALAMINE 1 PATCH: 1 PATCH, EXTENDED RELEASE TRANSDERMAL at 01:57

## 2019-01-01 RX ADMIN — AMPICILLIN SODIUM AND SULBACTAM SODIUM 3 G: 2; 1 INJECTION, POWDER, FOR SOLUTION INTRAMUSCULAR; INTRAVENOUS at 11:24

## 2019-01-01 RX ADMIN — FUROSEMIDE 40 MG: 10 INJECTION, SOLUTION INTRAMUSCULAR; INTRAVENOUS at 01:52

## 2019-01-01 RX ADMIN — AMPICILLIN SODIUM AND SULBACTAM SODIUM 3 G: 2; 1 INJECTION, POWDER, FOR SOLUTION INTRAMUSCULAR; INTRAVENOUS at 18:02

## 2019-01-01 RX ADMIN — CEFTRIAXONE SODIUM 1 G: 1 INJECTION, SOLUTION INTRAVENOUS at 07:57

## 2019-01-01 RX ADMIN — DOXYCYCLINE 100 MG: 100 INJECTION, POWDER, LYOPHILIZED, FOR SOLUTION INTRAVENOUS at 20:15

## 2019-01-01 RX ADMIN — LIDOCAINE HYDROCHLORIDE 1 G: 10 INJECTION, SOLUTION EPIDURAL; INFILTRATION; INTRACAUDAL; PERINEURAL at 09:29

## 2019-01-01 RX ADMIN — HALOPERIDOL LACTATE 1 MG: 5 INJECTION, SOLUTION INTRAMUSCULAR at 20:15

## 2019-01-01 RX ADMIN — MORPHINE SULFATE 2 MG: 2 INJECTION, SOLUTION INTRAMUSCULAR; INTRAVENOUS at 11:29

## 2019-01-01 RX ADMIN — VANCOMYCIN HYDROCHLORIDE 1000 MG: 1 INJECTION, SOLUTION INTRAVENOUS at 04:30

## 2019-01-01 RX ADMIN — DOXYCYCLINE 100 MG: 100 INJECTION, POWDER, LYOPHILIZED, FOR SOLUTION INTRAVENOUS at 21:40

## 2019-01-01 RX ADMIN — BRINZOLAMIDE/BRIMONIDINE TARTRATE 1 DROP: 10; 2 SUSPENSION/ DROPS OPHTHALMIC at 20:49

## 2019-01-01 RX ADMIN — SENNOSIDES AND DOCUSATE SODIUM 1 TABLET: 8.6; 5 TABLET ORAL at 09:07

## 2019-01-01 RX ADMIN — BRINZOLAMIDE/BRIMONIDINE TARTRATE 1 DROP: 10; 2 SUSPENSION/ DROPS OPHTHALMIC at 08:54

## 2019-01-01 RX ADMIN — TAZOBACTAM SODIUM AND PIPERACILLIN SODIUM 3.38 G: 375; 3 INJECTION, SOLUTION INTRAVENOUS at 03:56

## 2019-01-01 RX ADMIN — ASPIRIN 81 MG: 81 TABLET, COATED ORAL at 09:09

## 2019-01-01 RX ADMIN — TIMOLOL MALEATE 1 DROP: 5 SOLUTION OPHTHALMIC at 08:31

## 2019-01-01 RX ADMIN — TIMOLOL MALEATE 1 DROP: 5 SOLUTION OPHTHALMIC at 20:51

## 2019-01-01 RX ADMIN — SERTRALINE HYDROCHLORIDE 25 MG: 25 TABLET, FILM COATED ORAL at 09:02

## 2019-01-01 RX ADMIN — MORPHINE SULFATE 1 MG: 2 INJECTION, SOLUTION INTRAMUSCULAR; INTRAVENOUS at 10:19

## 2019-01-01 RX ADMIN — DOXYCYCLINE 100 MG: 100 INJECTION, POWDER, LYOPHILIZED, FOR SOLUTION INTRAVENOUS at 09:09

## 2019-01-01 RX ADMIN — SODIUM CHLORIDE 500 ML: 9 INJECTION, SOLUTION INTRAVENOUS at 07:00

## 2019-01-01 RX ADMIN — TIMOLOL MALEATE 1 DROP: 5 SOLUTION OPHTHALMIC at 09:09

## 2019-01-01 ASSESSMENT — ACTIVITIES OF DAILY LIVING (ADL)
ADLS_ACUITY_SCORE: 30
ADLS_ACUITY_SCORE: 29
ADLS_ACUITY_SCORE: 30
ADLS_ACUITY_SCORE: 29
ADLS_ACUITY_SCORE: 30
ADLS_ACUITY_SCORE: 34
ADLS_ACUITY_SCORE: 30
ADLS_ACUITY_SCORE: 34
ADLS_ACUITY_SCORE: 30
ADLS_ACUITY_SCORE: 30
ADLS_ACUITY_SCORE: 34
ADLS_ACUITY_SCORE: 30

## 2019-01-01 ASSESSMENT — ENCOUNTER SYMPTOMS
SEIZURES: 0
MYALGIAS: 0
SHORTNESS OF BREATH: 1
NERVOUS/ANXIOUS: 0
EYES NEGATIVE: 1
CONFUSION: 1
TREMORS: 0
CHILLS: 0
CARDIOVASCULAR NEGATIVE: 1
ARTHRALGIAS: 0
SHORTNESS OF BREATH: 0
DIZZINESS: 0
FREQUENCY: 0
AGITATION: 0
DYSURIA: 0
HEADACHES: 0
ABDOMINAL PAIN: 0
DYSPHORIC MOOD: 0
MUSCULOSKELETAL NEGATIVE: 1
NECK STIFFNESS: 0
GASTROINTESTINAL NEGATIVE: 1
LIGHT-HEADEDNESS: 0
BACK PAIN: 0
ACTIVITY CHANGE: 0
FEVER: 0
DIZZINESS: 1
WEAKNESS: 0
NECK PAIN: 0
HALLUCINATIONS: 0
SPEECH DIFFICULTY: 0
WOUND: 1
HYPERACTIVE: 0
CHILLS: 0
FATIGUE: 0
FACIAL ASYMMETRY: 0
RESPIRATORY NEGATIVE: 1
FEVER: 1
NUMBNESS: 0
APPETITE CHANGE: 0
NECK PAIN: 0
UNEXPECTED WEIGHT CHANGE: 0
NUMBNESS: 0
DIAPHORESIS: 0
DECREASED CONCENTRATION: 0
FEVER: 0

## 2019-01-01 ASSESSMENT — PAIN SCALES - GENERAL
PAINLEVEL: NO PAIN (0)

## 2019-01-01 ASSESSMENT — MIFFLIN-ST. JEOR: SCORE: 933.85

## 2019-01-12 NOTE — PROGRESS NOTES
Visit Date:   01/10/2019      CHIEF COMPLAINT:  Initial nurse practitioner evaluation for followup on aspiration pneumonia,  k hypokalemia and weakness.        HISTORY OF PRESENT ILLNESS:  The patient was admitted here last week from the Emergency Department due to generalized weakness.  Just prior to that, she had been hospitalized with aspiration pneumonia.  She improved and wanted to try making it back at the assisted living but was just too weak.  She was evaluated in the Emergency Department.  At that time, she had significant hypokalemia.  This was replaced with IV potassium.  She has been working with physical therapy and is getting stronger.  She is planning to move over to [facility], which is closer to her family once she is stronger.  Overall, she reports she is doing well.  By reviewing her weights today, her weight is up 10 pounds, but she has only had 2 weights since admission.  She denies shortness of breath, PND, orthopnea, cough and abdominal distention.      PAST MEDICAL HISTORY, PAST SURGICAL HISTORY, ALLERGIES, MEDICATION, RISK FACTORS, SOCIAL HISTORY:  All reviewed.      REVIEW OF SYSTEMS:  A 12-point complete review of systems reviewed and discussed with nursing staff, see HPI for positive findings otherwise unremarkable.      PHYSICAL EXAMINATION:   VITAL SIGNS:  Temp 97.7, weight 123 pounds.  SpO2 96% on room air, blood pressure 140/62.   GENERAL:  Pleasant female working in physical therapy on the exercise bike.  She is alert and pleasant.  She asked about her family repetitively.     HEENT:  Sclerae nonicteric.  Conjunctivae noninflamed.  Mucous membranes moist.   NECK:  Supple without adenopathy.   LUNGS:  Fields clear to auscultation.   CARDIOVASCULAR:  Regular rate and rhythm, no S3 auscultated.   ABDOMEN:  Soft, without masses, tenderness and organomegaly.   EXTREMITIES:  With trace to 1+ edema bilaterally.      LABORATORY DATA:  Reviewed and discussed in HPI.      ASSESSMENT AND PLAN:    1.  Generalized weakness.   2.  Aspiration pneumonia.   3.  GERD.   4.  Hypokalemia.      PLAN:  Would recommend nursing staff recheck her weight to make sure this is not still up.  If so, I am not finding any evidence of congestive heart failure.  Would agree that she is appropriate to transfer to assisted living once she has completed maximal rehab potential.  She can either go to assisted living or home with her granddaughter, whichever placement is appropriate.  Her granddaughter is her power of .  Continue with same medication and treatment plan at this time.         TICO DONOHUE NP             D: 01/10/2019   T: 2019   MT: LINDA      Name:     SHELLI WALTON   MRN:      [id number]        Account:      [id number]   :      10/17/1924           Visit Date:   01/10/2019      Document: [id number]       cc: Manuel Huerta MD

## 2019-01-17 NOTE — NURSING NOTE
Patient here for 30 day recertification currently at [facility] need discharge orders signed but does not say where and when discharging. Patient unsure where she is going to . Medication Reconciliation: complete.    Poppy Wang LPN  1/17/2019 1:00 PM

## 2019-01-17 NOTE — PROGRESS NOTES
SUBJECTIVE:   Karey Paulson is a 89 year old female who presents to clinic today for the following health issues: 30-day recert and discharge orders    Patient arrives here for 30-day recert and discharge orders.  She cannot offer much in the way of information but the last visit she is going to be discharged to [place].  She states that she has a niece in the area when she has not returned to call.  During the interview it was obvious that she does have some significant memory loss now.  She is forgetful.  One thing is she is adamant she does not want to stay at [facility].  She does have discharge orders available for review.          Patient Active Problem List    Diagnosis Date Noted     Hypokalemia 12/28/2018     Priority: Medium     Gastroenteritis due to norovirus 12/15/2018     Priority: Medium     Acute respiratory failure with hypoxia (H) 12/14/2018     Priority: Medium     Aspiration pneumonia (H) 12/13/2018     Priority: Medium     Aspiration into airway 12/13/2018     Priority: Medium     CAD (coronary artery disease) 12/13/2018     Priority: Medium     Sinus pause 12/04/2018     Priority: Medium     History of myocardial infarction 12/14/10 12/04/2018     Priority: Medium     Essential hypertension 12/04/2018     Priority: Medium     Hx of cardiac cath 12/14/10 with x3 stents to the RCA 12/04/2018     Priority: Medium     First degree heart block 12/04/2018     Priority: Medium     Generalized muscle weakness 12/04/2018     Priority: Medium     Bradycardia 11/08/2018     Priority: Medium     Syncope, unspecified syncope type 10/12/2018     Priority: Medium     Primary insomnia 04/11/2018     Priority: Medium     Weakness of both lower extremities 03/27/2018     Priority: Medium     S/P laparoscopic colectomy 11/14/2017     Priority: Medium     Diverticulosis of large intestine 10/22/2017     Priority: Medium     Overview:   S/p fle sig, 10/22/2017       Stricture of sigmoid colon (H)  10/22/2017     Priority: Medium     Overview:   S/p flex sig 10/22/2017       [place]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     Priority: Medium     Overview:        Recurrent UTI 10/21/2017     Priority: Medium     Overview:   2 in 2017, 10/13/2017 and 2017       Pancreatic cyst 10/20/2017     Priority: Medium     Overview:   Cystic nodules, multiple; s/p CT Abd Pelv       GERD 2015     Priority: Medium     Backache 2013     Priority: Medium     Actinic keratosis 2012     Priority: Medium     Osteoarthrosis 2011     Priority: Medium     Mixed hyperlipidemia 2009     Priority: Medium     Overview:   Overview:   IMO Update 10/11       Borderline glaucoma with ocular hypertension 2007     Priority: Medium     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     2017     Acute myocardial infarction (H)     2010     Cyst of pancreas     10/20/2017,Cystic nodules, multiple; s/p CT Abd Pelv     Diaphragmatic hernia without obstruction or gangrene     10/20/2017,s/p CT Abd/Pelv     Diverticulosis of large intestine without perforation or abscess without bleeding     10/22/2017,S/p fle sig, 10/22/2017     Edema     2011     Epigastric pain     2015     Essential (primary) hypertension     No Comments Provided     Gastro-esophageal reflux disease without esophagitis     2015     Osteoarthritis     2011     Other intestinal obstruction unspecified as to partial versus complete obstruction (CODE)     10/22/2017,S/p flex sig 10/22/2017     Personal history of malignant neoplasm of breast     Questionable 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     Personal history of other medical treatment (CODE)          Postmenopausal atrophic vaginitis     2012     Sepsis (H)     2017,E coli bacteremia; E coli UTI (resistant to Cipro)     Urinary tract infection     2017,Resistant to cipro     Urinary tract infection      10/21/2017,2 in 2017, 10/13/2017 and 6/28/2017      Past Surgical History:   Procedure Laterality Date     ANGIOPLASTY      12-,Angioplasty with 3 bare-metal stents RCA     APPENDECTOMY OPEN      1948,Appendectomy     CHOLECYSTECTOMY      1995,[place]     EXTRACAPSULAR CATARACT EXTRATION WITH INTRAOCULAR LENS IMPLANT      12/2016,[facility]at     HYSTERECTOMY TOTAL ABDOMINAL      1967,[facility], ovaries remaining     MASTECTOMY SIMPLE      1993,[place]     OTHER SURGICAL HISTORY      10/24/2017,[id number],OTHER,[facility]     Allergies   Allergen Reactions     Atorvastatin Other (See Comments) and Nausea and Vomiting     Myalgia     Ciprofloxacin Other (See Comments)     Erythromycin      Lisinopril Cough     Simvastatin Other (See Comments)     Achey muscles.      Sulfamethoxazole W/Trimethoprim Unknown     Nausea and shaking       Review of Systems     OBJECTIVE:     /60   Pulse 68   Wt 57.2 kg (126 lb)   BMI 20.34 kg/m    Body mass index is 20.34 kg/m .  Physical Exam   HENT:   Head: Normocephalic.   Left Ear: External ear normal.   Eyes: Pupils are equal, round, and reactive to light.   Cardiovascular: Normal rate and regular rhythm.   Pulmonary/Chest: Effort normal and breath sounds normal. No stridor. No respiratory distress. She has no wheezes.   Abdominal: Soft.   Musculoskeletal:   Patient is in wheelchair   Psychiatric:   Speech is slightly tangential.  Some memory loss present       none     ASSESSMENT/PLAN:         1. Acute respiratory failure with hypoxia (H)  Stable    2. Aspiration into airway, sequela  Stable    3. Aspiration pneumonia due to vomit, unspecified laterality, unspecified part of lung (H)  Stable    4. Essential hypertension  Stable    5. History of myocardial infarction 12/14/10      6. S/P laparoscopic colectomy  Forms are signed.  Currently patient is stable.  I did discuss with her she might need to establish care with a another provider but she indicates that she plans on  returning back here which may be difficult if she is in [place].        Byron Huerta MD  [facility]

## 2019-02-18 NOTE — NURSING NOTE
"Chief Complaint   Patient presents with     Care Coordination Nursing Home       Initial /64   Pulse 69   Temp 98  F (36.7  C)   Resp 18   Wt 54 kg (119 lb)   SpO2 94%   BMI 19.21 kg/m   Estimated body mass index is 19.21 kg/m  as calculated from the following:    Height as of 12/28/18: 1.676 m (5' 6\").    Weight as of this encounter: 54 kg (119 lb).  Medication Reconciliation: complete    Agustina Magaña LPN  "

## 2019-02-19 PROBLEM — Z78.9 NURSING HOME RESIDENT: Status: ACTIVE | Noted: 2019-01-01

## 2019-02-19 NOTE — PROGRESS NOTES
HISTORY OF PRESENT ILLNESS:  Karey is a 89 year old female (10/17/1924)  resident of [facility]  who is being seen today for a routine 30 day follow up. [facility] [place]  Karey was hospitalized with aspiration pneumonia and hypoxia.  She notes abdominal pain and constipation. Karey has a complicated past medical history.  Records are reviewed.Patient offers no other complaint.  Staff notes no other issues.    Current medications, allergies, and interdisciplinary care plan are reviewed.      Patient Active Problem List    Diagnosis Date Noted     Hypokalemia 12/28/2018     Priority: Medium     Gastroenteritis due to norovirus 12/15/2018     Priority: Medium     Acute respiratory failure with hypoxia (H) 12/14/2018     Priority: Medium     Aspiration pneumonia (H) 12/13/2018     Priority: Medium     Aspiration into airway 12/13/2018     Priority: Medium     CAD (coronary artery disease) 12/13/2018     Priority: Medium     Sinus pause 12/04/2018     Priority: Medium     History of myocardial infarction 12/14/10 12/04/2018     Priority: Medium     Essential hypertension 12/04/2018     Priority: Medium     Hx of cardiac cath 12/14/10 with x3 stents to the RCA 12/04/2018     Priority: Medium     First degree heart block 12/04/2018     Priority: Medium     Generalized muscle weakness 12/04/2018     Priority: Medium     Bradycardia 11/08/2018     Priority: Medium     Syncope, unspecified syncope type 10/12/2018     Priority: Medium     Primary insomnia 04/11/2018     Priority: Medium     Weakness of both lower extremities 03/27/2018     Priority: Medium     S/P laparoscopic colectomy 11/14/2017     Priority: Medium     Diverticulosis of large intestine 10/22/2017     Priority: Medium     Overview:   S/p fle sig, 10/22/2017       Stricture of sigmoid colon (H) 10/22/2017     Priority: Medium     Overview:   S/p flex sig 10/22/2017       [place]-vesical fistula  10/21/2017     Priority: Medium     Overview:        Recurrent UTI 10/21/2017     Priority: Medium     Overview:   2 in 2017, 10/13/2017 and 6/28/2017       Pancreatic cyst 10/20/2017     Priority: Medium     Overview:   Cystic nodules, multiple; s/p CT Abd Pelv       GERD 12/31/2015     Priority: Medium     Backache 02/24/2013     Priority: Medium     Actinic keratosis 04/18/2012     Priority: Medium     Osteoarthrosis 11/08/2011     Priority: Medium     Mixed hyperlipidemia 01/29/2009     Priority: Medium     Overview:   Overview:   IMO Update 10/11       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     Priority: Medium          Social History     Socioeconomic History     Marital status:      Spouse name: Not on file     Number of children: Not on file     Years of education: Not on file     Highest education level: Not on file   Social Needs     Financial resource strain: Not on file     Food insecurity - worry: Not on file     Food insecurity - inability: Not on file     Transportation needs - medical: Not on file     Transportation needs - non-medical: Not on file   Occupational History     Not on file   Tobacco Use     Smoking status: Never Smoker     Smokeless tobacco: Never Used   Substance and Sexual Activity     Alcohol use: No     Alcohol/week: 0.0 oz     Drug use: No     Comment: Drug use: No     Sexual activity: Not on file   Other Topics Concern     Parent/sibling w/ CABG, MI or angioplasty before 65F 55M? Not Asked   Social History Narrative    ; living @  [facility]-moved to [facility] after surgery 10/2017.  1 son passed away at 66.        Current Outpatient Medications   Medication Sig     acetaminophen (TYLENOL) 500 MG tablet Take 500 mg by mouth 4 times daily     acetaZOLAMIDE (DIAMOX) 250 MG tablet Take 250 mg by mouth 2 times daily     amLODIPine (NORVASC) 5 MG tablet Take 5 mg by mouth At Bedtime     ASPIRIN LOW DOSE 81 MG EC tablet TAKE 1 TABLET  BY MOUTH EVERY EVENING     brimonidine-timolol (COMBIGAN) 0.2-0.5 % ophthalmic solution Place 1 drop Into the left eye 2 times daily     brinzolamide-brimonidine (SIMBRINZA) 1-0.2 % ophthalmic suspension Place 1 drop into the right eye 3 times daily     latanoprost (XALATAN) 0.005 % ophthalmic solution Place 1 drop into the right eye At Bedtime     losartan (COZAAR) 100 MG tablet Take 1 tablet (100 mg) by mouth daily     magnesium hydroxide (MILK OF MAGNESIA) 400 MG/5ML suspension Take 30 mLs by mouth daily as needed for constipation or heartburn     melatonin 3 MG tablet TAKE 1 TABLET BY MOUTH NIGHTLY AT BEDTIME     order for DME Equipment being ordered: Wheelchair manual     prednisoLONE acetate (PRED FORTE) 1 % ophthalmic suspension Place 1 drop into both eyes daily      ranitidine (ZANTAC) 150 MG tablet Take 1 tablet (150 mg) by mouth 2 times daily     timolol (TIMOPTIC) 0.5 % ophthalmic solution PLACE 1 DROP IN THE RIGHT EYE TWICE DAILY     VITAMIN D3 1000 UNITS tablet TAKE 1 TABLET BY MOUTH ONCE DAILY (IN THE MORNING)     No current facility-administered medications for this visit.        Allergies   Allergen Reactions     Atorvastatin Other (See Comments) and Nausea and Vomiting     Myalgia     Ciprofloxacin Other (See Comments)     Erythromycin      Lisinopril Cough     Simvastatin Other (See Comments)     Achey muscles.      Sulfamethoxazole W/Trimethoprim Unknown     Nausea and shaking       I have reviewed the MDS and I have reviewed the care plan and do agree with the plan.      ROS:  No chest pain, shortness of breath, fever, chills, headache, nausea, vomiting, dysuria, or changes in bowel habits.  Appetite is normal.  Mild abdominal pain noted.          OBJECTIVE:  /64   Pulse 69   Temp 98  F (36.7  C)   Resp 18   Wt 54 kg (119 lb)   SpO2 94%   BMI 19.21 kg/m      GENERAL:  Chronically ill appearing, alert, and in no acute distress  RESP:  Lungs clear.  No rales, rhonchi, or wheezing  CV:   RRR.  S1 S2 without murmur. No clicks or rubs.  SKIN:  Age-related changes.  No suspicious lesions or rashes.  PSYCH:  Mentation confused, affect bright, and orientation not assessed.  EXTREM:  No edema.  Pulses palpable.          Lab/Diagnostic data:    Reviewed    ASSESSMENT/ORDERS:  Nursing Home Visit  Above issues stable.  No changes in current medications or care plan.      Total time spent with patient visit was 25 min including patient visit, review of pertinent clinical information, and treatment plan.      Travis Hooper MD

## 2019-05-01 NOTE — ED NOTES
"Pt presents from [facility] via ambulance s/p fall from wheelchair, according to EMT, pt was \"assisted to the ground, no LOC\", pt denies pain except her scalp, noted 1 inch superficial abrasion on Rt upper anterior scalp. Pt denies neck pain. Provider to the bedside, c-collar placed on pt. Plan for CT head and neck.  "

## 2019-05-01 NOTE — ED NOTES
Care handoff report called to LPN at [facility]. Pt to transport via wheelchair by [facility] back to .

## 2019-05-01 NOTE — ED PROVIDER NOTES
History     Chief Complaint   Patient presents with     Fall     fall from wheelchair, no LOC, + scalp abrasion     HPI  Karey Paulson is a 89 year old female who reportedly fell forward in her wheelchair and struck her head on likely a piece of furniture.  She did not lose consciousness.  She complains of a headache.  When asked why she fell, she states she has been feeling dizzy a lot lately, which is not new for her.  She also mentions lower abdominal pain that has been bothering her off and on.      Allergies:  Allergies   Allergen Reactions     Atorvastatin Other (See Comments) and Nausea and Vomiting     Myalgia     Ciprofloxacin Other (See Comments)     Erythromycin      Lisinopril Cough     Simvastatin Other (See Comments)     Achey muscles.      Sulfamethoxazole W/Trimethoprim Unknown     Nausea and shaking       Problem List:    Patient Active Problem List    Diagnosis Date Noted     Nursing Home Visit 02/19/2019     Priority: Medium     Hypokalemia 12/28/2018     Priority: Medium     Gastroenteritis due to norovirus 12/15/2018     Priority: Medium     Acute respiratory failure with hypoxia (H) 12/14/2018     Priority: Medium     Aspiration pneumonia (H) 12/13/2018     Priority: Medium     Aspiration into airway 12/13/2018     Priority: Medium     CAD (coronary artery disease) 12/13/2018     Priority: Medium     Sinus pause 12/04/2018     Priority: Medium     History of myocardial infarction 12/14/10 12/04/2018     Priority: Medium     Essential hypertension 12/04/2018     Priority: Medium     Hx of cardiac cath 12/14/10 with x3 stents to the RCA 12/04/2018     Priority: Medium     First degree heart block 12/04/2018     Priority: Medium     Generalized muscle weakness 12/04/2018     Priority: Medium     Bradycardia 11/08/2018     Priority: Medium     Syncope, unspecified syncope type 10/12/2018     Priority: Medium     Primary insomnia 04/11/2018     Priority: Medium     Weakness of both lower  extremities 03/27/2018     Priority: Medium     S/P laparoscopic colectomy 11/14/2017     Priority: Medium     Diverticulosis of large intestine 10/22/2017     Priority: Medium     Overview:   S/p fle sig, 10/22/2017       Stricture of sigmoid colon (H) 10/22/2017     Priority: Medium     Overview:   S/p flex sig 10/22/2017       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     Priority: Medium     Overview:        Recurrent UTI 10/21/2017     Priority: Medium     Overview:   2 in 2017, 10/13/2017 and 6/28/2017       Pancreatic cyst 10/20/2017     Priority: Medium     Overview:   Cystic nodules, multiple; s/p CT Abd Pelv       GERD 12/31/2015     Priority: Medium     Backache 02/24/2013     Priority: Medium     Actinic keratosis 04/18/2012     Priority: Medium     Osteoarthrosis 11/08/2011     Priority: Medium     Mixed hyperlipidemia 01/29/2009     Priority: Medium     Overview:   Overview:   IMO Update 10/11       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     Priority: Medium        Past Medical History:    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      Acute myocardial infarction (H)      Cyst of pancreas      Diaphragmatic hernia without obstruction or gangrene      Diverticulosis of large intestine without perforation or abscess without bleeding      Edema      Epigastric pain      Essential (primary) hypertension      Gastro-esophageal reflux disease without esophagitis      Osteoarthritis      Other intestinal obstruction unspecified as to partial versus complete obstruction (CODE)      Personal history of malignant neoplasm of breast      Personal history of other medical treatment (CODE)      Postmenopausal atrophic vaginitis      Sepsis (H)      Urinary tract infection      Urinary tract infection        Past Surgical History:    Past Surgical History:   Procedure Laterality Date     ANGIOPLASTY      12-,Angioplasty with 3 bare-metal  stents RCA     APPENDECTOMY OPEN      1948,Appendectomy     CHOLECYSTECTOMY      1995,[facility]     EXTRACAPSULAR CATARACT EXTRATION WITH INTRAOCULAR LENS IMPLANT      12/2016,[facility]     HYSTERECTOMY TOTAL ABDOMINAL      1967,[facility], ovaries remaining     MASTECTOMY SIMPLE      1993,[facility]     OTHER SURGICAL HISTORY      10/24/2017,[id number],OTHER,Ellen Arambula and Jairo       Family History:    Family History   Problem Relation Age of Onset     Breast Cancer No family hx of         Cancer-breast       Social History:  Marital Status:   [5]  Social History     Tobacco Use     Smoking status: Never Smoker     Smokeless tobacco: Never Used   Substance Use Topics     Alcohol use: No     Alcohol/week: 0.0 oz     Drug use: No     Comment: Drug use: No        Medications:      acetaminophen (TYLENOL) 500 MG tablet   acetaZOLAMIDE (DIAMOX) 250 MG tablet   amLODIPine (NORVASC) 5 MG tablet   ASPIRIN LOW DOSE 81 MG EC tablet   brimonidine-timolol (COMBIGAN) 0.2-0.5 % ophthalmic solution   latanoprost (XALATAN) 0.005 % ophthalmic solution   losartan (COZAAR) 100 MG tablet   melatonin 3 MG tablet   [START ON 5/1/2019] nitroFURantoin macrocrystal-monohydrate (MACROBID) 100 MG capsule   prednisoLONE acetate (PRED FORTE) 1 % ophthalmic suspension   ranitidine (ZANTAC) 150 MG tablet   senna-docusate (SENOKOT-S/PERICOLACE) 8.6-50 MG tablet   sertraline (ZOLOFT) 25 MG tablet   SIMBRINZA 1-0.2 % ophthalmic suspension   timolol maleate (TIMOPTIC) 0.5 % ophthalmic solution   VITAMIN D3 1000 UNITS tablet   calcium carbonate (TUMS) 500 MG chewable tablet   order for DME         Review of Systems   Constitutional: Negative for chills and fever.   HENT: Negative for ear pain.    Respiratory: Negative for shortness of breath.    Cardiovascular: Negative for chest pain.   Gastrointestinal: Negative for abdominal pain.   Genitourinary: Positive for pelvic pain. Negative for dysuria and frequency.   Musculoskeletal: Negative for neck pain.    Neurological: Positive for dizziness. Negative for numbness.   All other systems reviewed and are negative.      Physical Exam   BP: 139/69  Pulse: 51  Heart Rate: 52  Temp: 97.9  F (36.6  C)  Resp: 15  SpO2: 99 %      Physical Exam   Constitutional: She is oriented to person, place, and time. No distress.   HENT:   Right Ear: External ear normal.   Left Ear: External ear normal.   Nose: Nose normal.   Mouth/Throat: Oropharynx is clear and moist.   Abrasion to right frontal scalp   Eyes: Conjunctivae and EOM are normal.   Bilateral pupils with surgical changes   Neck: Normal range of motion. Neck supple.   C-spine non tender, C-collar placed in ER   Cardiovascular: Regular rhythm and normal heart sounds.   bradycardia   Pulmonary/Chest: Effort normal and breath sounds normal. No respiratory distress. She has no wheezes. She exhibits no tenderness.   Abdominal: Soft. She exhibits no distension and no mass. There is tenderness (mild suprapubic tenderness). There is no rebound and no guarding.   Musculoskeletal: Normal range of motion. She exhibits no edema, tenderness or deformity.        Cervical back: She exhibits no tenderness.        Thoracic back: She exhibits no tenderness.        Lumbar back: She exhibits no tenderness.   Neurological: She is alert and oriented to person, place, and time. No cranial nerve deficit. She exhibits normal muscle tone. Coordination normal.   Skin: Skin is warm and dry. She is not diaphoretic.   Psychiatric: She has a normal mood and affect.       ED Course        Procedures           Results for orders placed or performed during the hospital encounter of 04/30/19 (from the past 24 hour(s))   Comprehensive metabolic panel   Result Value Ref Range    Sodium 139 133 - 144 mmol/L    Potassium 3.6 3.4 - 5.3 mmol/L    Chloride 110 (H) 94 - 109 mmol/L    Carbon Dioxide 23 20 - 32 mmol/L    Anion Gap 6 3 - 14 mmol/L    Glucose 126 (H) 70 - 99 mg/dL    Urea Nitrogen 28 7 - 30 mg/dL     Creatinine 0.72 0.52 - 1.04 mg/dL    GFR Estimate 72 >60 mL/min/[1.73_m2]    GFR Estimate If Black 83 >60 mL/min/[1.73_m2]    Calcium 9.3 8.5 - 10.1 mg/dL    Bilirubin Total 0.3 0.2 - 1.3 mg/dL    Albumin 3.4 3.4 - 5.0 g/dL    Protein Total 6.4 (L) 6.8 - 8.8 g/dL    Alkaline Phosphatase 56 40 - 150 U/L    ALT 21 0 - 50 U/L    AST 14 0 - 45 U/L   CBC with platelets differential   Result Value Ref Range    WBC 6.0 4.0 - 11.0 10e9/L    RBC Count 3.86 3.8 - 5.2 10e12/L    Hemoglobin 11.3 (L) 11.7 - 15.7 g/dL    Hematocrit 35.0 35.0 - 47.0 %    MCV 91 78 - 100 fl    MCH 29.3 26.5 - 33.0 pg    MCHC 32.3 31.5 - 36.5 g/dL    RDW 15.9 (H) 10.0 - 15.0 %    Platelet Count 216 150 - 450 10e9/L    Diff Method Automated Method     % Neutrophils 60.7 %    % Lymphocytes 23.9 %    % Monocytes 10.5 %    % Eosinophils 3.2 %    % Basophils 1.5 %    % Immature Granulocytes 0.2 %    Nucleated RBCs 0 0 /100    Absolute Neutrophil 3.6 1.6 - 8.3 10e9/L    Absolute Lymphocytes 1.4 0.8 - 5.3 10e9/L    Absolute Monocytes 0.6 0.0 - 1.3 10e9/L    Absolute Eosinophils 0.2 0.0 - 0.7 10e9/L    Absolute Basophils 0.1 0.0 - 0.2 10e9/L    Abs Immature Granulocytes 0.0 0 - 0.4 10e9/L    Absolute Nucleated RBC 0.0    Lactic acid whole blood   Result Value Ref Range    Lactic Acid 1.0 0.7 - 2.0 mmol/L   CT Head w/o Contrast    Narrative    CT HEAD W/O CONTRAST, CT CERVICAL SPINE W/O CONTRAST, 4/30/2019 9:16  PM    History: Female, age 89 years; Head trauma, headache    Comparison: None.    Head CT technique: CT scan was performed of the head/brain without  contrast. Sagittal, coronal and axial reconstructions were reviewed.    Cervical spine CT technique: CT was performed of the cervical spine.  Sagittal, coronal, and axial reconstructions were reviewed.    Head CT: The ventricles and sulci are mildly prominent. The gray and  white matter demonstrate scattered areas of decreased density. The  bony structures demonstrate no fracture. Paranasal sinuses are  normal.    Cervical spine CT: The  cervical spine demonstrates normal curvature.  No acute fracture, no acute subluxation. Soft tissues of the neck  demonstrate generalized enlargement of the thyroid gland with a low  dense mass in the left lobe which measures approximately 3 cm x 4 cm  in size. No lymphadenopathy..      Impression    IMPRESSION:   Head CT: No evidence of acute intracranial abnormality.   Moderate generalized atrophy. White matter changes suggest small  vessel disease.    Cervical spine CT: No evidence of acute or subacute bony abnormality.      Generalized osteopenia with prominent degenerative changes.  3 cm x 4 cm soft tissue mass in the left lobe of the thyroid gland.  Thyroid ultrasound to better characterize.    OMEGA RAMIREZ MD   Cervical spine CT w/o contrast    Narrative    CT HEAD W/O CONTRAST, CT CERVICAL SPINE W/O CONTRAST, 4/30/2019 9:16  PM    History: Female, age 89 years; Head trauma, headache    Comparison: None.    Head CT technique: CT scan was performed of the head/brain without  contrast. Sagittal, coronal and axial reconstructions were reviewed.    Cervical spine CT technique: CT was performed of the cervical spine.  Sagittal, coronal, and axial reconstructions were reviewed.    Head CT: The ventricles and sulci are mildly prominent. The gray and  white matter demonstrate scattered areas of decreased density. The  bony structures demonstrate no fracture. Paranasal sinuses are normal.    Cervical spine CT: The  cervical spine demonstrates normal curvature.  No acute fracture, no acute subluxation. Soft tissues of the neck  demonstrate generalized enlargement of the thyroid gland with a low  dense mass in the left lobe which measures approximately 3 cm x 4 cm  in size. No lymphadenopathy..      Impression    IMPRESSION:   Head CT: No evidence of acute intracranial abnormality.   Moderate generalized atrophy. White matter changes suggest small  vessel disease.    Cervical spine  CT: No evidence of acute or subacute bony abnormality.      Generalized osteopenia with prominent degenerative changes.  3 cm x 4 cm soft tissue mass in the left lobe of the thyroid gland.  Thyroid ultrasound to better characterize.    OMEGA RAMIREZ MD   UA reflex to Microscopic and Culture   Result Value Ref Range    Color Urine Yellow     Appearance Urine Clear     Glucose Urine Negative NEG^Negative mg/dL    Bilirubin Urine Negative NEG^Negative    Ketones Urine Negative NEG^Negative mg/dL    Specific Gravity Urine 1.020 1.003 - 1.035    Blood Urine Negative NEG^Negative    pH Urine 5.5 4.7 - 8.0 pH    Protein Albumin Urine Negative NEG^Negative mg/dL    Urobilinogen mg/dL Normal 0.0 - 2.0 mg/dL    Nitrite Urine Negative NEG^Negative    Leukocyte Esterase Urine Moderate (A) NEG^Negative    Source Catheterized Urine     RBC Urine 2 0 - 2 /HPF    WBC Urine 30 (H) 0 - 5 /HPF    Bacteria Urine Few (A) NEG^Negative /HPF    Mucous Urine Present (A) NEG^Negative /LPF       Medications   nitroFURantoin macrocrystal-monohydrate (MACROBID) capsule 100 mg (has no administration in time range)       Assessments & Plan (with Medical Decision Making)     1. Fall, initial encounter    2. Abrasion of scalp, initial encounter    3. Urinary tract infection       -CT head negative for head bleed.  CT cervical spine negative for acute fracture - C-spine cleared.    -Scalp abrasion without need for staples.  Recommend bacitracin twice daily for one week.  -UTI - review of previous urine culture in November shows multi-drug resistance.  Sensitive to cipro, bactrim, and nitrofurantonin.  Patient is allergic to cipro and bactrim.  She was treated with augmentin in the past; however, her last culture was resistant to ampicillin. She has good renal function; therefore, I will treat her with nitrofurantonin.  Follow up culture results.  Follow up with PCP.      I have reviewed the nursing notes.    I have reviewed the findings,  diagnosis, plan and need for follow up with the patient.       Medication List      Started    nitroFURantoin macrocrystal-monohydrate 100 MG capsule  Commonly known as:  MACROBID  100 mg, Oral, 2 TIMES DAILY  Start taking on:  5/1/2019            Final diagnoses:   Fall, initial encounter   Abrasion of scalp, initial encounter   Urinary tract infection     Eula Vegas PA-C  4/30/2019   [facility]     Eula Vegas PA-C  04/30/19 [id number]    Addendum  UCx returns with C. Albicans. Patient will be treated with a 7 day course of fluconazole 200 mg daily with plan for PCP follow-up in 3 - 5 days regarding this treatment course. Bailey, Nursing Supervisor, will update [facility] regarding the prescribed treatment.       Tolu Quiros MD  05/07/19 [id number]

## 2019-05-02 NOTE — RESULT ENCOUNTER NOTE
Result reviewed with Dr. Celis who recommends waiting for final sensitivity and id.  Patient currently on nitrofurantoin.

## 2019-05-06 NOTE — TELEPHONE ENCOUNTER
Please call nursing staff and inform of urine culture result showing candida albicans infection as source of recent UTI. I have sent in prescription for fluconazole 200mg for 14 days, though official sensitivity is pending.

## 2019-05-06 NOTE — PROGRESS NOTES
Please call and inform nursing staff of positive culture results showing recent UTI is due to candida albicans. Official sensitivity is pending, though I have sent in a prescription for fluconazole 200mg daily for 14 days.

## 2019-05-08 NOTE — RESULT ENCOUNTER NOTE
Result reviewed with Dr. Quiros.   See his note in chart.  Called and spoke with charge nurse Елена at [facility] updated patient needs to be treated with fluconazole 200mg po daily for 7 days.  Rx [facility] to [facility], per nursing home will resend to [facility] in [place].  Result faxed to [facility] [phone]

## 2019-05-13 PROBLEM — Z87.898 H/O SYNCOPE: Status: ACTIVE | Noted: 2018-10-12

## 2019-05-13 PROBLEM — I25.10 CORONARY ARTERY DISEASE INVOLVING NATIVE CORONARY ARTERY OF NATIVE HEART WITHOUT ANGINA PECTORIS: Status: ACTIVE | Noted: 2018-01-01

## 2019-05-13 PROBLEM — I45.5 SINUS PAUSE: Status: ACTIVE | Noted: 2018-01-01

## 2019-05-13 PROBLEM — R00.1 BRADYCARDIA: Status: ACTIVE | Noted: 2018-11-08

## 2019-05-13 NOTE — PROGRESS NOTES
Cardiology Progress Note     Assessment & Plan   Karey Paulson is a 89 year old female who is being seen in follow-up to visit on 12/4/2018. She had a Zio patch which showed heart rates as low as 32 bpm and pauses at 3.6 seconds with concerns for near syncope.      She was seen in the ER on 10/7/2018 and 11/5/2018 with concerns for syncope.      During her first visit on 10/7/2018, she was described as being flushed, warm, sweaty, nauseated and then passed out.  She was incontinent of stool.  She was out for about 20 seconds.  Her heart rate was felt to be in the 40's.  Symptoms were suggestive as being the result of vasovagal syncope.  She had abdominal pain that precipitated her vasovagal event. She has not convinced that she actually passed out with any of these episodes.  She has not any palpitations or irregular heart rates.    She was seen in the ER on 11/5/2018.  She was felt to be without a pulse for approximately a minute.  She had been complaining of shortness of breath and heart problems.  She had been having some lower abdominal pain.  She was felt to have possible syncope.  Her work-up was revealing for UTI, dehydration, first-degree heart block, septal infarct and T wave inversions on EKG.  Her telemetry was largely unremarkable.  She was given Augmentin for UTI.  Chest x-ray was normal.  She was discharged back to [facility].    She was seen by her primary in follow-up on 10/12/18.  She had been sitting when she had a loss of consciousness.  She was taken off atenolol.  Her heart rate and blood pressure was noted to be low.  Norvasc was decreased from 10 mg daily down to 5 mg daily.  She was set up for a Zio patch.     She had a Zio patch completed on 10/16/2018.  Showed x1 pause lasting up to 3.6 seconds on 10/19/2019 at 5:34 PM.  Slowest heart rate was 32 bpm at 9:13 AM.  There were x3 runs of PSVT lasting up to 17 beats.  There were also episodes of episodes of PSVT and VE.    We  discussed the findings of her Zio patch.  She was told that recommendations would be a IIa for a pacemaker placement.  Based on her episode of syncope, pauses, and bradycardia, she could be considered for pacemaker.  This is something she was willing to have done but was never completed. She has not had any more episodes of syncope.    She also has a history of myocardial infarction on 12/14/2010.  She had 3 stents placed to her RCA.  She denies any chest pain, chest tightness, or chest discomfort.    Ultrasound of her carotids were performed on 10/16/2018 with less than 50% stenosis to her right internal carotid artery.      Impression:  1.  History of syncope x2 on 10/7/2018 and 11/5/2018 with ER visit.  2.  1st degree heart block.  3.  Sinus pauses at 3.6 seconds at 5:34 PM on 10/19/2019.  4.  Bradycardia as low as 32 bpm on 9/13 a.m.  5.  History of MI on 12/14/2010  6.  History of coronary stent placement x3 to the RCA on 12/14/2010.  7.  HTN.  8.  CAD.  9.  Hyperlipidemia.  10.  Generalized weakness.  11.  GERD.    Plan:  1.  No changes made.  2.  She will be seen in approximately 6 months follow-up.        Juwan Rao        Physical Exam   Temp: 97.3  F (36.3  C) Temp src: Tympanic BP: 103/55 Pulse: 54   Resp: 22 SpO2: 96 %      Vitals:    05/13/19 1039   Weight: 51.7 kg (114 lb)     Vital Signs with Ranges  Temp:  [97.3  F (36.3  C)] 97.3  F (36.3  C)  Pulse:  [54] 54  Resp:  [22] 22  BP: (103)/(55) 103/55  SpO2:  [96 %] 96 %  ROS is negative except that which was noted in the HPI.     Pressure Injury 12/13/18 Posterior;Medial Coccyx Stage 1 (Active)   Wound Base Erythema, blanchable 12/18/2018  7:56 AM   Lakia-wound Assessment Blanchable erythema 12/18/2018  7:56 AM   Drainage Amount None 12/18/2018  7:56 AM   Wound Care/Cleansing Barrier applied  12/18/2018  7:56 AM   Dressing Protective barrier 12/18/2018  7:56 AM   Number of days: 151       Constitutional: awake, alert, cooperative, no apparent  distress, and appears stated age  Eyes: Lids and lashes normal, pupils equal, sclera clear, conjunctiva normal  ENT: Normocephalic, without obvious abnormality, atraumatic.  Respiratory: No increased work of breathing, good air exchange, clear to auscultation bilaterally, no crackles or wheezing  Cardiovascular: Normal apical impulse, regular rate and rhythm, normal S1 and S2, no S3 or S4, and no murmur noted  Musculoskeletal: There is no redness, warmth, or swelling of the joints.  Full range of motion noted.  Motor strength is 5 out of 5 all extremities bilaterally.  Tone is normal.  Neurologic: Awake, alert, oriented to name, place and time.    Neuropsychiatric: General: normal, calm and normal eye contact    Medications          Data   No results found for this or any previous visit (from the past 24 hour(s)).  No results found for this or any previous visit (from the past 24 hour(s)).

## 2019-05-13 NOTE — PATIENT INSTRUCTIONS
You were seen by Dr. Rao, 5/13/2019.     1.  Please continue all medication as they have been prescribed.      2.  If you develop new or worsening symptoms please call the cardiology office as you may need to be seen sooner.       You will follow up with Dr. Rao in 6 months.       Please call the cardiology office with problems, questions, or concerns at [phone].    If you experience chest pain, chest pressure, chest tightness, shortness of breath, fainting, lightheadedness, nausea, vomiting, or other concerning symptoms, please report to the Emergency Department or call 911. These symptoms may be emergent, and best treated in the Emergency Department.       Deena SCHULTZ RN-BSN  Cardiology   [facility]  [phone]

## 2019-05-28 NOTE — TELEPHONE ENCOUNTER
acetaZOLAMIDE  Last Written Prescription Date: 11/26/18  Last Fill Quantity:  # of Refills: 5  Last Office Visit: 4/25/19    Losartan  Last Written Prescription Date: 12/4/18  Last Fill Quantity: 93 # of Refills: 3  Last Office Visit: 4/25/19    amLODIPine  Last Written Prescription Date:   Last Fill Quantity:  # of Refills:   Last Office Visit: 4/25/19    [facility] med

## 2019-05-30 PROBLEM — Z78.9 NURSING HOME RESIDENT: Status: ACTIVE | Noted: 2019-01-01

## 2019-07-02 NOTE — NURSING NOTE
Patient Information     Patient Name MRN K[facility]y [facility] [id number] Female 10/17/1924      Nursing Note by Lola Ibarra RN at 2017  9:30 AM     Author:  Lola Ibarra RN Service:  (none) Author Type:  NURS- Registered Nurse     Filed:  2017 10:07 AM Encounter Date:  2017 Status:  Signed     :  Lola Ibarra RN (NURS- Registered Nurse)            Patient positioned in frog leg position prior to James Arambula MD prepping patient with chlorhexidine gluconate cleanser.    [place] Protocol    A. Pre-procedure verification complete yes  1-relevant information / documentation available, reviewed and properly matched to the patient; 2-consent accurate and complete, 3-equipment and supplies available    B. Site marking complete N/A  Site marked if not in continuous attendance with patient    C. TIME OUT completed yes  Time Out was conducted just prior to starting procedure to verify the eight required elements: 1-patient identity, 2-consent accurate and complete, 3-position, 4-correct side/site marked (if applicable), 5-procedure, 6-relevant images / results properly labeled and displayed (if applicable), 7-antibiotics / irrigation fluids (if applicable), 8-safety precautions.    After procedure perineum area rinsed. Discharge instructions reviewed with patient. Patient verbalized understanding of discharge instructions and discharged ambulatory.          The patient is a 66y year old Male complaining of dizziness.

## 2019-07-08 NOTE — TELEPHONE ENCOUNTER
Simbrinza      Last Written Prescription Date:  3/5/2019  Last Fill Quantity: 8 ml,   # refills: 3  Last Office Visit: 7/01/2019 (nursing home)  Future Office visit:       Routing refill request to provider for review/approval because:  Drug not on the [facility], [facility] or [facility] refill protocol or controlled substance

## 2019-08-05 PROBLEM — E87.6 HYPOKALEMIA: Status: RESOLVED | Noted: 2018-01-01 | Resolved: 2019-01-01

## 2019-08-05 PROBLEM — I25.2 HISTORY OF MYOCARDIAL INFARCTION: Status: ACTIVE | Noted: 2018-01-01

## 2019-08-05 PROBLEM — R29.898 WEAKNESS OF BOTH LOWER EXTREMITIES: Status: RESOLVED | Noted: 2018-03-27 | Resolved: 2019-01-01

## 2019-08-05 PROBLEM — J69.0 ASPIRATION PNEUMONIA (H): Status: RESOLVED | Noted: 2018-01-01 | Resolved: 2019-01-01

## 2019-08-05 PROBLEM — I25.10 CORONARY ARTERY DISEASE INVOLVING NATIVE CORONARY ARTERY OF NATIVE HEART WITHOUT ANGINA PECTORIS: Status: RESOLVED | Noted: 2018-01-01 | Resolved: 2019-01-01

## 2019-08-05 PROBLEM — T17.908A ASPIRATION INTO AIRWAY: Status: RESOLVED | Noted: 2018-01-01 | Resolved: 2019-01-01

## 2019-08-05 PROBLEM — J96.01 ACUTE RESPIRATORY FAILURE WITH HYPOXIA (H): Status: RESOLVED | Noted: 2018-01-01 | Resolved: 2019-01-01

## 2019-08-05 PROBLEM — A08.11 GASTROENTERITIS DUE TO NOROVIRUS: Status: RESOLVED | Noted: 2018-01-01 | Resolved: 2019-01-01

## 2019-08-05 NOTE — NURSING NOTE
"Chief Complaint   Patient presents with     Care Coordination Nursing Home       Initial /62   Pulse 77   Temp 97.4  F (36.3  C)   Resp 16   Wt 50.3 kg (111 lb)   SpO2 96%   BMI 17.92 kg/m   Estimated body mass index is 17.92 kg/m  as calculated from the following:    Height as of 5/13/19: 1.676 m (5' 6\").    Weight as of this encounter: 50.3 kg (111 lb).  Medication Reconciliation: complete  "

## 2019-08-06 NOTE — PROGRESS NOTES
HISTORY OF PRESENT ILLNESS:  Karey is a 89 year old female (10/17/1924)  resident of [facility]  who is being seen today for a routine 30 day follow up. [facility] [place]  Karey was hospitalized with aspiration pneumonia and hypoxia.  She has had abdominal pain and constipation, which is now improved.. Karey has a complicated past medical history. .Patient offers no other complaint.  Staff notes no other issues.    Current medications, allergies, and interdisciplinary care plan are reviewed.      Patient Active Problem List    Diagnosis Date Noted     Coronary artery disease involving native coronary artery of native heart without angina pectoris 12/13/2018     Priority: High     History of myocardial infarction 12/14/10 12/04/2018     Priority: High     Sinus pause at 3.6 seconds at 5:34 PM on 10/19/2019 12/04/2018     Priority: Medium     Essential hypertension 12/04/2018     Priority: Medium     Hx of cardiac cath 12/14/10 with x3 stents to the RCA 12/04/2018     Priority: Medium     First degree heart block 12/04/2018     Priority: Medium     Generalized muscle weakness 12/04/2018     Priority: Medium     Bradycardia as low as 32 bpm at 9:13 AM 11/08/2018     Priority: Medium     H/O syncope x2 on 10/7/2018 and 11/5/2018 10/12/2018     Priority: Medium     Primary insomnia 04/11/2018     Priority: Medium     S/P laparoscopic colectomy 11/14/2017     Priority: Medium     Diverticulosis of large intestine 10/22/2017     Priority: Medium     Overview:   S/p fle sig, 10/22/2017       Stricture of sigmoid colon (H) 10/22/2017     Priority: Medium     Overview:   S/p flex sig 10/22/2017       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     Priority: Medium     Overview:        Recurrent UTI 10/21/2017     Priority: Medium     Overview:   2 in 2017, 10/13/2017 and 6/28/2017       Pancreatic cyst 10/20/2017     Priority: Medium     Overview:   Cystic nodules, multiple;  s/p CT Abd Pelv       GERD 12/31/2015     Priority: Medium     Actinic keratosis 04/18/2012     Priority: Medium     Osteoarthrosis 11/08/2011     Priority: Medium     Mixed hyperlipidemia 01/29/2009     Priority: Medium     Overview:   Overview:   IMO Update 10/11       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     Priority: Medium     Nursing Home Visit 05/30/2019     Priority: Low          Social History     Socioeconomic History     Marital status:      Spouse name: Not on file     Number of children: Not on file     Years of education: Not on file     Highest education level: Not on file   Occupational History     Not on file   Social Needs     Financial resource strain: Not on file     Food insecurity:     Worry: Not on file     Inability: Not on file     Transportation needs:     Medical: Not on file     Non-medical: Not on file   Tobacco Use     Smoking status: Never Smoker     Smokeless tobacco: Never Used   Substance and Sexual Activity     Alcohol use: No     Alcohol/week: 0.0 oz     Drug use: No     Comment: Drug use: No     Sexual activity: Not on file   Lifestyle     Physical activity:     Days per week: Not on file     Minutes per session: Not on file     Stress: Not on file   Relationships     Social connections:     Talks on phone: Not on file     Gets together: Not on file     Attends Congregational service: Not on file     Active member of club or organization: Not on file     Attends meetings of clubs or organizations: Not on file     Relationship status: Not on file     Intimate partner violence:     Fear of current or ex partner: Not on file     Emotionally abused: Not on file     Physically abused: Not on file     Forced sexual activity: Not on file   Other Topics Concern     Parent/sibling w/ CABG, MI or angioplasty before 65F 55M? Not Asked   Social History Narrative    ; living @  [facility]-moved to [facility] after surgery 10/2017.  1 son passed  away at 66.        Current Outpatient Medications   Medication Sig     acetaminophen (TYLENOL) 500 MG tablet Take 500 mg by mouth 4 times daily     acetaZOLAMIDE (DIAMOX) 250 MG tablet Take 1 tablet (250 mg) by mouth 2 times daily     amLODIPine (NORVASC) 5 MG tablet Take 1 tablet (5 mg) by mouth At Bedtime     ASPIRIN LOW DOSE 81 MG EC tablet TAKE 1 TABLET BY MOUTH EVERY EVENING     brimonidine-timolol (COMBIGAN) 0.2-0.5 % ophthalmic solution Place 1 drop Into the left eye 2 times daily     calcium carbonate (TUMS) 500 MG chewable tablet Take 2 chew tab by mouth 4 times daily as needed for heartburn     latanoprost (XALATAN) 0.005 % ophthalmic solution Place 1 drop into the right eye At Bedtime     losartan (COZAAR) 100 MG tablet Take 1 tablet (100 mg) by mouth daily     melatonin 3 MG tablet TAKE 1 TABLET BY MOUTH NIGHTLY AT BEDTIME     order for DME Equipment being ordered: Wheelchair manual     prednisoLONE acetate (PRED FORTE) 1 % ophthalmic suspension Place 1 drop into both eyes daily      ranitidine (ZANTAC) 150 MG tablet Take 1 tablet (150 mg) by mouth 2 times daily     senna-docusate (SENOKOT-S/PERICOLACE) 8.6-50 MG tablet Take 1 tablet by mouth daily     sertraline (ZOLOFT) 25 MG tablet Take 25 mg by mouth daily     SIMBRINZA 1-0.2 % ophthalmic suspension INSTILL ONE DROP IN THE RIGHT EYE THREE TIMES DAILY     timolol maleate (TIMOPTIC) 0.5 % ophthalmic solution INSTILL ONE DROP IN THE RIGHT EYE TWICE DAILY. wait FIVE MINUTES between drops     VITAMIN D3 1000 UNITS tablet TAKE 1 TABLET BY MOUTH ONCE DAILY (IN THE MORNING)     No current facility-administered medications for this visit.        Allergies   Allergen Reactions     Atorvastatin Other (See Comments) and Nausea and Vomiting     Myalgia     Ciprofloxacin Other (See Comments)     Erythromycin      Lisinopril Cough     Simvastatin Other (See Comments)     Achey muscles.      Sulfamethoxazole W/Trimethoprim Unknown     Nausea and shaking       I have  reviewed the MDS and I have reviewed the care plan and do agree with the plan.      ROS:  No chest pain, shortness of breath, fever, chills, headache, nausea, vomiting, dysuria, or changes in bowel habits.  Appetite is normal.  Mild abdominal pain noted.          OBJECTIVE:  /62   Pulse 77   Temp 97.4  F (36.3  C)   Resp 16   Wt 50.3 kg (111 lb)   SpO2 96%   BMI 17.92 kg/m      GENERAL:  Chronically ill appearing, alert, and in no acute distress  RESP:  Lungs clear.  No rales, rhonchi, or wheezing  CV:  RRR.  S1 S2 without murmur. No clicks or rubs.  SKIN:  Age-related changes.  No suspicious lesions or rashes.  PSYCH:  Mentation confused, affect bright, and orientation not assessed.  EXTREM:  No edema.  Pulses palpable.          Lab/Diagnostic data:    Reviewed    ASSESSMENT/ORDERS:  Nursing Home Visit  Above issues stable.  No changes in current medications or care plan.      Total time spent with patient visit was 25 min including patient visit, review of pertinent clinical information, and treatment plan.      Travis Hooper MD

## 2019-09-15 PROBLEM — I60.9 SAH (SUBARACHNOID HEMORRHAGE) (H): Status: ACTIVE | Noted: 2019-01-01

## 2019-09-15 NOTE — PLAN OF CARE
Temp: 97.7  F (36.5  C) Temp src: Tympanic BP: 168/81 Pulse: 60 Heart Rate: 60 Resp: 21 SpO2: 92 % O2 Device: None (Room air)       Initial set of vitals recorded; provided patient with call light, ID band on, bed low and locked, bed alarm on, outside of unit station.     Face to face report given with opportunity to observe patient.    Report given to HAMLET Norwood RN   9/15/2019  3:28 PM

## 2019-09-15 NOTE — ED NOTES
89 y/o female presents via [facility] with reports of an unwitnessed [facility], patient baseline is confusion, baselines of irregular fixed pupils. When found on floor, patient was awake and alert.  Hematoma noted to R forehead with small abrasion. Patient states she does not remember fall. GCS 14 on arrival. Denies headache. Denies dizziness. Not anticoagulated.

## 2019-09-15 NOTE — ED NOTES
Called and updated nurse Octavia at [facility]. Aware patient will be admitted. Called and updated granddaughter Elba also. All questions answered.

## 2019-09-15 NOTE — PLAN OF CARE
[facility] Inpatient Admission Note:    Patient admitted to 3214/3214-1 at approximately 1445 via cart accompanied by other:self  from emergency room . Report received from HAMLET Akbar in SBAR format at 1425 via telephone. Patient transferred to bed via slide board.. Patient is alert and oriented X 1, denies pain; rates at 0 on 0-10 scale.  Patient oriented to room, unit, hourly rounding, and plan of care. Explained admission packet and patient handbook with patient bill of rights brochure. Will continue to monitor and document as needed.     Inpatient Nursing criteria listed below was met:    Health care directives status obtained and documented: No    Care Everywhere authorization obtained No    MRSA swab completed for patient 65 years and older: Yes    Patient identifies a surrogate decision maker: No If yes, who:See Facesheet Contact Information: Comes from [facility] diagnosis present:No.      If initial lactic acid >2.0, repeat lactic acid drawn within one hour of arrival to unit: NA. If no, state reason: NA    Vaccination assessment and education completed: Yes   Vaccinations received prior to admission: Pneumovax yes  Influenza(seasonal)  YES   Vaccination(s) ordered: NA    Clergy visit ordered if patient requests: Yes    Skin issues/needs documented: No    Isolation Patient: no Education given, correct sign in place and documentation row added to PCS:  NA    Fall Prevention Yes: Care plan updated, education given and documented, sticker and magnet in place: No    Care Plan initiated: No    Education Documented (including assessment): No    Patient has discharge needs : Yes If yes, please explain: Baseline Dementia

## 2019-09-15 NOTE — ED PROVIDER NOTES
History     Chief Complaint   Patient presents with     Fall     fell from a sitting position and found on the floor at the [facility] home. pt denies pain at present     The history is provided by the patient, the nursing home and the EMS personnel. The history is limited by the condition of the patient. No  was used.     Karey Paulson is a 89 year old female who was found on the floor at the [facility] home lying next to her chair. NH staff reports she was awake when found but seemed to be slightly more confused than normal but she seemed to clear to her baseline MS prior to ems arrival. Pt is currently awake and denies any pain or discomfort. She states she does not know what happened and does not remember falling from the chair. Pt is currently oriented to name, age, place. She is not oriented to time.     Allergies:  Allergies   Allergen Reactions     Atorvastatin Other (See Comments) and Nausea and Vomiting     Myalgia     Ciprofloxacin Other (See Comments)     Erythromycin      Lisinopril Cough     Simvastatin Other (See Comments)     Achey muscles.      Sulfamethoxazole W/Trimethoprim Unknown     Nausea and shaking       Problem List:    Patient Active Problem List    Diagnosis Date Noted     History of bradycardia 09/16/2019     Priority: Medium     Fall 09/16/2019     Priority: Medium     SAH (subarachnoid hemorrhage) (H) 09/15/2019     Priority: Medium     Nursing Home Visit 05/30/2019     Priority: Medium     Coronary artery disease involving native coronary artery of native heart without angina pectoris 12/13/2018     Priority: Medium     Sinus pause at 3.6 seconds at 5:34 PM on 10/19/2019 12/04/2018     Priority: Medium     History of myocardial infarction 12/14/10 12/04/2018     Priority: Medium     Essential hypertension 12/04/2018     Priority: Medium     Hx of cardiac cath 12/14/10 with x3 stents to the RCA 12/04/2018     Priority: Medium     First degree heart block 12/04/2018      Priority: Medium     Generalized muscle weakness 12/04/2018     Priority: Medium     Bradycardia 11/08/2018     Priority: Medium     H/O syncope x2 on 10/7/2018 and 11/5/2018 10/12/2018     Priority: Medium     Primary insomnia 04/11/2018     Priority: Medium     S/P laparoscopic colectomy 11/14/2017     Priority: Medium     Diverticulosis of large intestine 10/22/2017     Priority: Medium     Overview:   S/p fle sig, 10/22/2017       Stricture of sigmoid colon (H) 10/22/2017     Priority: Medium     Overview:   S/p flex sig 10/22/2017       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     Priority: Medium     Overview:        Recurrent UTI 10/21/2017     Priority: Medium     Overview:   2 in 2017, 10/13/2017 and 6/28/2017       Pancreatic cyst 10/20/2017     Priority: Medium     Overview:   Cystic nodules, multiple; s/p CT Abd Pelv       GERD 12/31/2015     Priority: Medium     Actinic keratosis 04/18/2012     Priority: Medium     Osteoarthrosis 11/08/2011     Priority: Medium     Mixed hyperlipidemia 01/29/2009     Priority: Medium     Overview:   Overview:   IMO Update 10/11       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     Priority: Medium        Past Medical History:    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      Acute myocardial infarction (H)      Cyst of pancreas      Diaphragmatic hernia without obstruction or gangrene      Diverticulosis of large intestine without perforation or abscess without bleeding      Edema      Epigastric pain      Essential (primary) hypertension      Gastro-esophageal reflux disease without esophagitis      Osteoarthritis      Other intestinal obstruction unspecified as to partial versus complete obstruction (CODE)      Personal history of malignant neoplasm of breast      Personal history of other medical treatment (CODE)      Postmenopausal atrophic vaginitis      Sepsis (H)      Urinary tract infection       Urinary tract infection        Past Surgical History:    Past Surgical History:   Procedure Laterality Date     ANGIOPLASTY      12-,Angioplasty with 3 bare-metal stents RCA     APPENDECTOMY OPEN      1948,Appendectomy     CHOLECYSTECTOMY      1995,[place]     EXTRACAPSULAR CATARACT EXTRATION WITH INTRAOCULAR LENS IMPLANT      12/2016,bilat     HYSTERECTOMY TOTAL ABDOMINAL      1967,[facility], ovaries remaining     MASTECTOMY SIMPLE      1993,[place]     OTHER SURGICAL HISTORY      10/24/2017,[id number],OTHER,Ellen Arambula and Jairo       Family History:    Family History   Problem Relation Age of Onset     Breast Cancer No family hx of         Cancer-breast       Social History:  Marital Status:   [5]  Social History     Tobacco Use     Smoking status: Never Smoker     Smokeless tobacco: Never Used   Substance Use Topics     Alcohol use: No     Alcohol/week: 0.0 oz     Drug use: No     Comment: Drug use: No        Medications:      acetaZOLAMIDE (DIAMOX) 250 MG tablet   amLODIPine (NORVASC) 5 MG tablet   ASPIRIN LOW DOSE 81 MG EC tablet   brimonidine-timolol (COMBIGAN) 0.2-0.5 % ophthalmic solution   latanoprost (XALATAN) 0.005 % ophthalmic solution   losartan (COZAAR) 100 MG tablet   melatonin 3 MG tablet   order for DME   prednisoLONE acetate (PRED FORTE) 1 % ophthalmic suspension   ranitidine (ZANTAC) 150 MG tablet   senna-docusate (SENOKOT-S/PERICOLACE) 8.6-50 MG tablet   sertraline (ZOLOFT) 25 MG tablet   SIMBRINZA 1-0.2 % ophthalmic suspension   timolol maleate (TIMOPTIC) 0.5 % ophthalmic solution   VITAMIN D3 1000 UNITS tablet   calcium carbonate (TUMS) 500 MG chewable tablet         Review of Systems   Constitutional: Negative for activity change, appetite change, chills, diaphoresis, fatigue, fever and unexpected weight change.   Eyes: Negative.    Respiratory: Negative.    Cardiovascular: Negative.    Gastrointestinal: Negative.    Genitourinary: Negative.    Musculoskeletal: Negative.  Negative for  arthralgias, back pain, myalgias, neck pain and neck stiffness.   Skin: Positive for wound.   Neurological: Negative for dizziness, tremors, seizures, facial asymmetry, speech difficulty, weakness, light-headedness, numbness and headaches.   Psychiatric/Behavioral: Positive for confusion. Negative for agitation, behavioral problems, decreased concentration, dysphoric mood, hallucinations and self-injury. The patient is not nervous/anxious and is not hyperactive.        Physical Exam   BP: 176/71  Pulse: 60  Heart Rate: 62  Temp: 97.6  F (36.4  C)  Resp: 16  Weight: 50.1 kg (110 lb 7.2 oz)  SpO2: 97 %      Physical Exam   Constitutional: She appears well-developed and well-nourished. No distress.   HENT:   Head: Head is with abrasion and with contusion. Head is without raccoon's eyes and without Hutton's sign.       Right Ear: External ear normal.   Left Ear: External ear normal.   Mouth/Throat: Mucous membranes are normal.   Eyes: Conjunctivae, EOM and lids are normal. Left eye exhibits exudate. Right pupil is not round and not reactive. Left pupil is not round and not reactive. Pupils are unequal.   Neck: Trachea normal, normal range of motion and full passive range of motion without pain. Neck supple. No tracheal tenderness, no spinous process tenderness and no muscular tenderness present. No neck rigidity. No tracheal deviation, no edema, no erythema and normal range of motion present.   Cardiovascular: Normal rate, regular rhythm, S1 normal, normal heart sounds, intact distal pulses and normal pulses.   Pulmonary/Chest: Effort normal and breath sounds normal.   Abdominal: Soft. Normal appearance and bowel sounds are normal. There is no tenderness.   Musculoskeletal: Normal range of motion.        Right shoulder: Normal.        Left shoulder: Normal.        Right hip: Normal.        Left hip: Normal.        Cervical back: Normal.        Thoracic back: Normal.        Lumbar back: Normal.   Neurological: She is  alert. She has normal strength and normal reflexes. She is not disoriented. No cranial nerve deficit or sensory deficit. GCS eye subscore is 4. GCS verbal subscore is 5. GCS motor subscore is 6.   Pt is mildly confused but is appropriate in her speech and responses   Skin: Skin is warm and dry. Capillary refill takes less than 2 seconds. Abrasion and bruising noted. There is erythema.        Psychiatric: She has a normal mood and affect. Her speech is normal and behavior is normal. Judgment and thought content normal. Cognition and memory are impaired. She exhibits abnormal recent memory.       ED Course     ED Course as of Sep 19 1156   Sun Sep 15, 2019   1415 Discussed CT results with Dr Tejada neurosurgeon who recommends observation and repeat ct in 8 hrs with q4h neuro checks. I informed pt of this plan. I also contacted Dr Cyr hospitalist who accepts admission          Procedures               EKG Interpretation:      Interpreted by Yael Burgos NP  Time reviewed: 1234  Symptoms at time of EKG: unwitnessed fall   Rhythm: 1 degree AV block  Rate: 50-60  Axis: Left Axis Deviation  Ectopy: none  Conduction: 1st degree AV block  ST Segments/ T Waves: No ST-T wave changes and No acute ischemic changes  Q Waves: none  Comparison to prior: Unchanged    Clinical Impression: 1st degree AV block                     No results found for this or any previous visit (from the past 24 hour(s)).    Medications   Td (tetanus-diptheria toxoid) injection 0.5 mL (0.5 mLs Intramuscular Given 9/15/19 1321)       Assessments & Plan (with Medical Decision Making)     I have reviewed the nursing notes.    I have reviewed the findings, diagnosis, plan and need for follow up with the patient.      Discharge Medication List as of 9/16/2019 10:41 AM          Final diagnoses:   Subarachnoid hemorrhage (H)   Fall, initial encounter       9/15/2019   [facility]     Yael Burgos NP  09/15/19 [id number]       Yael Burgos,  NP  09/19/19 1156

## 2019-09-15 NOTE — H&P
"[facility]    History and Physical  Hospitalist       Date of Admission:  9/15/2019  Date of Service (when I saw the patient): 09/15/19    Assessment & Plan   Karey Paulson is a 89 year old woman who presents after an unwitnessed fall from her chair at her nursing home.  She has severe dementia and little meaningful history is available.  She does not recall any of the events of her fall.  Nursing home staff noted that initially her confusion was perhaps slightly greater than baseline but subsequently rapidly improved to her prior baseline.  The patient states that today was \"a bad day\" and that she otherwise has felt as well as she usually does.  Evaluation in emergency department included a CT showing right frontal parietal superficial hematoma and a small right occipital area of subarachnoid hemorrhage.  Emergency department staff indicate they spoke with neurosurgery via telephone who recommended in-hospital monitoring and follow-up CT scan.    1.  Fall  Source of this is uncertain.  She has severe dementia and details are uncertain.  Unwitnessed fall.  History does show previously identified conduction delays.  Could consider arrhythmia as source of her falls although even if found treatment decisions might be difficult.  No significant electrolyte abnormalities.  In-hospital monitoring including telemetry.  2.  Limited subarachnoid hemorrhage  Closed head injury  Quite consistent with her fall with contrecoup injury.  Appears the initial direct injury was to her right frontoparietal area where she has a hematoma.  Close monitoring with neuro checks and follow-up CT scan.  Neurochecks.  If there is no extension of her bleeding no further.  Space even if there is some increased intracerebral hemorrhage treatment decisions may be difficult in this frail elderly patient.  3.  Cardiac conduction delay  She has had conduction delays noted including bradycardia, sinus tachycardia, first-degree AV block.  " "Unlikely to be the source of her fall but is noted this cannot be ruled out.  Although even if identified it may be that no active treatment would be fruitful nevertheless reasonable to monitor with telemetry at least for the next 12 hours or so.  4.  Coronary artery disease  She does have now somewhat remote coronary artery disease with revascularization.  Continuing aspirin.  5.  Primary hypertension  Continue treatment with losartan and amlodipine.          DVT Prophylaxis: Not mandated for this predicted short hospital stay  Code Status: DNR / DNI    Disposition: Expected discharge in 1-2 days depending on her course.    Haseeb Baker    Primary Care Physician   Byron Huerta    Chief Complaint   Unwitnessed fall at her nursing home    History is obtained from review of available record.  Little meaningful history available from the patient.    History of Present Illness   Karey Paulson is a 89 year old woman who presents after an unwitnessed fall from chair in her nursing home.  She sustained a hematoma to the right frontoparietal area.  Imaging showed in addition a limited right occipital subarachnoid hemorrhage.  Neurosurgery consultation was arranged by telephone in the emergency department.  Emergency department staff indicate recommended was in hospital monitoring and repeat CT imaging.  It is unlikely that any operative or other active intervention would be beneficial in the best of circumstances.  Other history is difficult to obtain.  Patient is only able to indicate that today was \"a bad day\" and that prior to today she was feeling in her usual state of health.  She recalls no fevers or chills.  She does note that she needs regular encouragement to eat.  She has not had any orthostatic disequilibrium that she can recall.  She only gets from bed to chair and does not walk.  She does not recall any GI symptoms.  No dysuria.  Review of other available record does not suggest any prodromal " symptoms.    Past Medical History    I have reviewed this patient's medical history and updated it with pertinent information if needed.   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     2017     Acute myocardial infarction (H)     2010     Cyst of pancreas     10/20/2017,Cystic nodules, multiple; s/p CT Abd Pelv     Diaphragmatic hernia without obstruction or gangrene     10/20/2017,s/p CT Abd/Pelv     Diverticulosis of large intestine without perforation or abscess without bleeding     10/22/2017,S/p fle sig, 10/22/2017     Edema     2011     Epigastric pain     2015     Essential (primary) hypertension     No Comments Provided     Gastro-esophageal reflux disease without esophagitis     2015     Osteoarthritis     2011     Other intestinal obstruction unspecified as to partial versus complete obstruction (CODE)     10/22/2017,S/p flex sig 10/22/2017     Personal history of malignant neoplasm of breast     Questionable 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     Personal history of other medical treatment (CODE)          Postmenopausal atrophic vaginitis     2012     Sepsis (H)     2017,E coli bacteremia; E coli UTI (resistant to Cipro)     Urinary tract infection     2017,Resistant to cipro     Urinary tract infection     10/21/2017,2 in , 10/13/2017 and 2017       Past Surgical History   I have reviewed this patient's surgical history and updated it with pertinent information if needed.  Past Surgical History:   Procedure Laterality Date     ANGIOPLASTY      2010,Angioplasty with 3 bare-metal stents RCA     APPENDECTOMY OPEN      ,Appendectomy     CHOLECYSTECTOMY      ,Clare     EXTRACAPSULAR CATARACT EXTRATION WITH INTRAOCULAR LENS IMPLANT      2016,bilat     HYSTERECTOMY TOTAL ABDOMINAL      ,RUEL, ovaries remaining     MASTECTOMY SIMPLE      ,Clare     OTHER SURGICAL HISTORY       10/24/2017,[id number],OTHER,Ellen Arambula and Jairo       Prior to Admission Medications   Prior to Admission Medications   Prescriptions Last Dose Informant Patient Reported? Taking?   ASPIRIN LOW DOSE 81 MG EC tablet 9/15/2019 at Unknown time [facility] No Yes   Sig: TAKE 1 TABLET BY MOUTH EVERY EVENING   SIMBRINZA 1-0.2 % ophthalmic suspension 9/15/2019 at Unknown time [facility] No Yes   Sig: INSTILL ONE DROP IN THE RIGHT EYE THREE TIMES DAILY   VITAMIN D3 1000 UNITS tablet 9/15/2019 at Unknown time Nursing Home Yes Yes   Sig: TAKE 1 TABLET BY MOUTH ONCE DAILY (IN THE MORNING)   acetaZOLAMIDE (DIAMOX) 250 MG tablet 9/15/2019 at Unknown time [facility] No Yes   Sig: Take 1 tablet (250 mg) by mouth 2 times daily   acetaminophen (TYLENOL) 500 MG tablet 9/15/2019 at Unknown time Nursing Home Yes Yes   Sig: Take 500 mg by mouth 4 times daily   amLODIPine (NORVASC) 5 MG tablet 9/14/2019 at Unknown time [facility] No Yes   Sig: Take 1 tablet (5 mg) by mouth At Bedtime   brimonidine-timolol (COMBIGAN) 0.2-0.5 % ophthalmic solution 9/15/2019 at Unknown time Nursing Home Yes Yes   Sig: Place 1 drop Into the left eye 2 times daily   calcium carbonate (TUMS) 500 MG chewable tablet Unknown at Unknown time [facility] Yes No   Sig: Take 2 chew tab by mouth 4 times daily as needed for heartburn   latanoprost (XALATAN) 0.005 % ophthalmic solution 9/14/2019 at Unknown time Nursing Home Yes Yes   Sig: Place 1 drop into the right eye At Bedtime   losartan (COZAAR) 100 MG tablet 9/15/2019 at 0719 [facility] No Yes   Sig: Take 1 tablet (100 mg) by mouth daily   melatonin 3 MG tablet 9/14/2019 at Unknown time [facility] No Yes   Sig: TAKE 1 TABLET BY MOUTH NIGHTLY AT BEDTIME   order for DME 9/15/2019 at Unknown time [facility] No Yes   Sig: Equipment being ordered: Wheelchair manual   prednisoLONE acetate (PRED FORTE) 1 % ophthalmic suspension 9/15/2019 at 0717 [facility] Yes Yes   Sig: Place 1 drop into both eyes daily     ranitidine (ZANTAC) 150 MG tablet 9/15/2019 at Unknown time care home No Yes   Sig: Take 1 tablet (150 mg) by mouth 2 times daily   senna-docusate (SENOKOT-S/PERICOLACE) 8.6-50 MG tablet 9/15/2019 at Unknown time Nursing Home Yes Yes   Sig: Take 1 tablet by mouth daily   sertraline (ZOLOFT) 25 MG tablet 9/14/2019 at [facility] [place] Yes Yes   Sig: Take 25 mg by mouth daily   timolol maleate (TIMOPTIC) 0.5 % ophthalmic solution 9/15/2019 at Unknown time care home No Yes   Sig: INSTILL ONE DROP IN THE RIGHT EYE TWICE DAILY. wait FIVE MINUTES between drops      Facility-Administered Medications: None     Allergies   Allergies   Allergen Reactions     Atorvastatin Other (See Comments) and Nausea and Vomiting     Myalgia     Ciprofloxacin Other (See Comments)     Erythromycin      Lisinopril Cough     Simvastatin Other (See Comments)     Achey muscles.      Sulfamethoxazole W/Trimethoprim Unknown     Nausea and shaking       Social History   I have reviewed this patient's social history and updated it with pertinent information if needed. Karey DICK Paulson  reports that she has never smoked. She has never used smokeless tobacco. She reports that she does not drink alcohol or use drugs.    Family History   I have reviewed this patient's family history and updated it with pertinent information if needed.   Family History   Problem Relation Age of Onset     Breast Cancer No family hx of         Cancer-breast       Review of Systems   The 10 point Review of Systems is negative other than noted in the HPI.    Physical Exam   Temp: 98.2  F (36.8  C) Temp src: Tympanic BP: 156/62 Pulse: 68 Heart Rate: 57 Resp: 16 SpO2: 98 % O2 Device: None (Room air)    Vital Signs with Ranges  Temp:  [97.6  F (36.4  C)-98.2  F (36.8  C)] 98.2  F (36.8  C)  Pulse:  [60-68] 68  Heart Rate:  [51-62] 57  Resp:  [16-21] 16  BP: (156-176)/(62-81) 156/62  SpO2:  [92 %-98 %] 98 %  110 lbs 7.21 oz      Awake, alert, pleasant interactive woman  lying in bed on medical wards.  Easily distractible.  Short-term recall intact for 0/3 objects at 5 minutes.  Oriented to person and generically to that she is in the hospital with some prompting.    HEENT: Right frontoparietal hematoma with generally intact skin.  Minimally tender.  No other cranial or neck tenderness.  Pupils with anisocoria and absent light reflex.  Appears to be postoperative change and present on earlier examinations on other evaluations.   No icterus or nystagmus. Oral mucosa moist. No facial asymmetry.   Neck: Supple, jugular veins not elevated. Trachea midline.  No adenopathy  Chest: No chest wall movement asymmetry. Aeration preserved to bases. Accessory muscles not in use. Expiratory time not increased. No tidal wheezes. No rhonchi. No discrete crackles.   Cardiac: PMI not displaced. S1, S2 unremarkable. No S3, S4. P2 not accentuated. No murmurs. Carotid upstroke preserved. Carotid amplitude preserved.   Abdomen: Soft. No palpation or percussion tenderness. No distention. Normoactive bowel sounds. Liver and spleen not increased in size. No bruits, masses, or pulsations.   Extremities: No lower extremity edema. No eccymoses, clubbing.   Neurologic: Mental state above. Motor 5/5 and bilaterally equal. Tone preserved. No fasiculations or tremors. Sensation intact to light touch. DTR 2/4 and bilaterally equal.   Data   Data reviewed today:  I personally reviewed EKG which shows a mild sinus bradycardia with first degree AV block and ventricular response 53 bpm.  Axis -40.  MD 0.24.  QT not elevated.  Anterior septal Q waves.  No ST-T wave abnormality.  Low voltage.  CT of the head as detailed above confirms right frontoparietal hematoma and right occipital small subarachnoid hemorrhage.    Recent Labs   Lab 09/15/19  1234   WBC 7.1   HGB 13.1   MCV 92         POTASSIUM 3.9   CHLORIDE 113*   CO2 25   BUN 30   CR 0.58   ANIONGAP 5   KEILY 9.4   GLC 64*   ALBUMIN 3.4   PROTTOTAL 6.8    BILITOTAL 0.4   ALKPHOS 66   ALT 32   AST 15       Lactic Acid   Date Value Ref Range Status   04/30/2019 1.0 0.7 - 2.0 mmol/L Final   12/14/2018 2.2 0.5 - 2.2 mmol/L Final   11/05/2018 1.0 0.5 - 2.2 mmol/L Final       Recent Results (from the past 24 hour(s))   CT Head w/o Contrast    Narrative    PROCEDURE: CT HEAD W/O CONTRAST   9/15/2019 1:21 PM    HISTORY:Female, age,  89 years, , , Altered level of consciousness  (LOC), unexplained; fall at nursing home unwitnessed    COMPARISON: Head CT 4/30/2019    TECHNIQUE: CT of the brain without contrast.    FINDINGS: Ventricles and sulci are prominent in size and shape. Gray  and white matter demonstrate scattered areas of decreased density.    There is a focal area of increased density involving the cortex and  adjacent sulci in the right occipital region. Subcutaneous hematoma in  the right frontal region.    The bones are unremarkable. No fracture.       Impression    IMPRESSION:   Small right occipital subarachnoid hemorrhage, likely related to a  countercoup injury with no significant mass effect effect. No midline  shift.     Examination was discussed with Yael Burgos at 1345 hours 9/15/2019.     Right frontotemporal soft tissue contusion with hematoma.    Moderate generalized    OMEGA RAMIREZ MD

## 2019-09-16 PROBLEM — Z87.898 HISTORY OF BRADYCARDIA: Status: ACTIVE | Noted: 2019-01-01
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
## 2019-09-16 NOTE — PLAN OF CARE
Patient discharged at 12:05 PM via wheel chair accompanied by  Transport staff. Prescriptions sent to patients preferred pharmacy. All belongings sent with patient.     Discharge instructions reviewed with sent with patient for nursing home. Listed belongings gathered and returned to patient. yes    Patient discharged to [facility].   Report called to Nursing Home:  Mandeep    Core Measures and Vaccines  Core Measures applicable during stay: No. If yes, state diagnosis  Pneumonia and Influenza given prior to discharge, if indicated: NA    Surgical Patient   Surgical Procedures during stay: no  Did patient receive discharge instruction on wound care and recognition of infection symptoms? Yes    MISC  Follow up appointment made:  No  Home and hospital aquired medications returned to patient: N/A  Patient reports pain was well managed at discharge: Yes

## 2019-09-16 NOTE — PLAN OF CARE
Face to face report given with opportunity to observe patient.    Report given to Justa SCHULTZ RN.    Enma Kiran RN   9/15/2019  11:20 PM

## 2019-09-16 NOTE — CONSULTS
[facility]    History and Physical / Consult note: Trauma Service     Date of Admission:  9/15/2019    Time of Admission/Consult Request (page/call):  9/15/2019 1530   Time of my evaluation: 9/16/2019 800    Consulted in ED:   Neurosurgery per report     Assessment & Plan    89 y.o. Female admitted after an unwitnessed fall from wheelchair at the nursing home. The Ed had consulted neurosurgery who recommended observation and repeat CT scan which was stable. I have found no other issues on tertiary exam, it was a difficult neurologic exam due to patient not following direction well which I presume is related to her underlying dementia. Her CXR and Pelvis XR are negative and she is wheelchair bound.     Ok to discharge back to facility from surgery standpoint. Will need PCP follow up.       Trauma mechanism:fall from chair   Time/date of injury: 9/15/2019 1200  Known Injuries:  1. Frontal contusion on right   2. SAH   Other diagnoses:   -Dementia, wheelchair   Procedure:    Plan:  1. Ok to discharge     Code status: DNR/DNI c.         ETOH: N/A   Primary Care Physician   Byron Huerta    Chief Complaint   Fall     Patient     History of Present Illness   Karey Paulson is a 89 year old woman who presents after an unwitnessed fall from chair in her nursing home.  She sustained a hematoma to the right frontoparietal area.   Neurosurgery consultation was arranged by telephone in the emergency department.  Emergency department staff indicate recommended was in hospital monitoring and repeat CT imaging.  She is unsure where she is or why.   Past Medical History    I have reviewed this patient's medical history and updated it with pertinent information if needed.   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     11/14/2017     Acute myocardial infarction (H)     12/14/2010     Cyst of pancreas     10/20/2017,Cystic nodules, multiple; s/p CT Abd Pelv      Diaphragmatic hernia without obstruction or gangrene     10/20/2017,s/p CT Abd/Pelv     Diverticulosis of large intestine without perforation or abscess without bleeding     10/22/2017,S/p fle sig, 10/22/2017     Edema     2011     Epigastric pain     2015     Essential (primary) hypertension     No Comments Provided     Gastro-esophageal reflux disease without esophagitis     2015     Osteoarthritis     2011     Other intestinal obstruction unspecified as to partial versus complete obstruction (CODE)     10/22/2017,S/p flex sig 10/22/2017     Personal history of malignant neoplasm of breast     Questionable 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     Personal history of other medical treatment (CODE)          Postmenopausal atrophic vaginitis     2012     Sepsis (H)     2017,E coli bacteremia; E coli UTI (resistant to Cipro)     Urinary tract infection     2017,Resistant to cipro     Urinary tract infection     10/21/2017,2 in , 10/13/2017 and 2017       Past Surgical History   I have reviewed this patient's surgical history and updated it with pertinent information if needed.  Past Surgical History:   Procedure Laterality Date     ANGIOPLASTY      2010,Angioplasty with 3 bare-metal stents RCA     APPENDECTOMY OPEN      ,Appendectomy     CHOLECYSTECTOMY      ,[place]     EXTRACAPSULAR CATARACT EXTRATION WITH INTRAOCULAR LENS IMPLANT      2016,bilat     HYSTERECTOMY TOTAL ABDOMINAL      ,RUEL, ovaries remaining     MASTECTOMY SIMPLE      ,[place]     OTHER SURGICAL HISTORY      10/24/2017,[id number],OTHER,Ellen Arambula and Jairo     Prior to Admission Medications   Prior to Admission Medications   Prescriptions Last Dose Informant Patient Reported? Taking?   ASPIRIN LOW DOSE 81 MG EC tablet 9/15/2019 at [address] [place] [address] No Yes   Sig: TAKE 1 TABLET BY MOUTH EVERY EVENING   SIMBRINZA 1-0.2 % ophthalmic suspension 9/15/2019 at [address] [place] [address] No Yes   Sig:  INSTILL ONE DROP IN THE RIGHT EYE THREE TIMES DAILY   VITAMIN D3 1000 UNITS tablet 9/15/2019 at [facility] Yes Yes   Sig: TAKE 1 TABLET BY MOUTH ONCE DAILY (IN THE MORNING)   acetaZOLAMIDE (DIAMOX) 250 MG tablet 9/15/2019 at 0719 [facility] No Yes   Sig: Take 1 tablet (250 mg) by mouth 2 times daily   acetaminophen (TYLENOL) 500 MG tablet 9/15/2019 at Unknown time [facility] Yes Yes   Sig: Take 500 mg by mouth 4 times daily   amLODIPine (NORVASC) 5 MG tablet 9/14/2019 at [address] [facility] No Yes   Sig: Take 1 tablet (5 mg) by mouth At Bedtime   brimonidine-timolol (COMBIGAN) 0.2-0.5 % ophthalmic solution 9/15/2019 at 28 [facility] Yes Yes   Sig: Place 1 drop Into the left eye 2 times daily   calcium carbonate (TUMS) 500 MG chewable tablet Unknown at Unknown time [facility] Yes No   Sig: Take 2 chew tab by mouth 4 times daily as needed for heartburn   latanoprost (XALATAN) 0.005 % ophthalmic solution 9/14/2019 at [address] [place] [address] Yes Yes   Sig: Place 1 drop into the right eye At Bedtime   losartan (COZAAR) 100 MG tablet 9/15/2019 at [address] [place] [address] No Yes   Sig: Take 1 tablet (100 mg) by mouth daily   melatonin 3 MG tablet 9/14/2019 at [address] [place] [address] No Yes   Sig: TAKE 1 TABLET BY MOUTH NIGHTLY AT BEDTIME   order for DME 9/15/2019 at Unknown time [facility] No Yes   Sig: Equipment being ordered: Wheelchair manual   prednisoLONE acetate (PRED FORTE) 1 % ophthalmic suspension 9/15/2019 at 0717 [facility] Yes Yes   Sig: Place 1 drop into both eyes daily    ranitidine (ZANTAC) 150 MG capsule 9/15/2019 at 0719  Yes Yes   Sig: Take 150 mg by mouth 2 times daily   ranitidine (ZANTAC) 150 MG tablet 9/15/2019 at 19 [facility] No Yes   Sig: Take 1 tablet (150 mg) by mouth 2 times daily   senna-docusate (SENOKOT-S/PERICOLACE) 8.6-50 MG tablet 9/15/2019 at 0719 [facility] Yes Yes   Sig: Take 1 tablet by mouth daily   sertraline (ZOLOFT) 25 MG tablet 9/14/2019 at [address] [place] [address] Yes Yes   Sig:  Take 25 mg by mouth daily   timolol maleate (TIMOPTIC) 0.5 % ophthalmic solution 9/15/2019 at [address] [place] [address] No Yes   Sig: INSTILL ONE DROP IN THE RIGHT EYE TWICE DAILY. wait FIVE MINUTES between drops      Facility-Administered Medications: None     Allergies   Allergies   Allergen Reactions     Atorvastatin Other (See Comments) and Nausea and Vomiting     Myalgia     Ciprofloxacin Other (See Comments)     Erythromycin      Lisinopril Cough     Simvastatin Other (See Comments)     Achey muscles.      Sulfamethoxazole W/Trimethoprim Unknown     Nausea and shaking       Social History   Social History     Socioeconomic History     Marital status:      Spouse name: Not on file     Number of children: Not on file     Years of education: Not on file     Highest education level: Not on file   Occupational History     Not on file   Social Needs     Financial resource strain: Not on file     Food insecurity:     Worry: Not on file     Inability: Not on file     Transportation needs:     Medical: Not on file     Non-medical: Not on file   Tobacco Use     Smoking status: Never Smoker     Smokeless tobacco: Never Used   Substance and Sexual Activity     Alcohol use: No     Alcohol/week: 0.0 oz     Drug use: No     Comment: Drug use: No     Sexual activity: Not on file   Lifestyle     Physical activity:     Days per week: Not on file     Minutes per session: Not on file     Stress: Not on file   Relationships     Social connections:     Talks on phone: Not on file     Gets together: Not on file     Attends [employer] service: Not on file     Active member of club or organization: Not on file     Attends meetings of clubs or organizations: Not on file     Relationship status: Not on file     Intimate partner violence:     Fear of current or ex partner: Not on file     Emotionally abused: Not on file     Physically abused: Not on file     Forced sexual activity: Not on file   Other Topics Concern     Parent/sibling w/  CABG, MI or angioplasty before 65F 55M? Not Asked   Social History Narrative    ; living @  [facility]-moved to [facility] after surgery 10/2017.  1 son passed away at 66.       Family History   Non-contributory     Review of Systems   Unable to answer questions appropriately, denies any vision changes, no musculoskeletal pain.     Physical Exam   Temp: 98.4  F (36.9  C) Temp src: Tympanic BP: 163/69 Pulse: 68 Heart Rate: 68 Resp: 18 SpO2: 96 % O2 Device: None (Room air)    Vital Signs with Ranges  Temp:  [97.6  F (36.4  C)-98.7  F (37.1  C)] 98.4  F (36.9  C)  Pulse:  [60-68] 68  Heart Rate:  [51-68] 68  Resp:  [16-21] 18  BP: (155-176)/(58-81) 163/69  SpO2:  [92 %-98 %] 96 % 110 lbs 7.21 oz    # Pain Assessment:  Current Pain Score 9/15/2019   Patient currently in pain? -   Pain score (0-10) -   Pain location -   Pain descriptors -   rFLACC pain score 0         Primary Survey:  Airway: patient talking  Breathing: symmetric respiratory effort bilaterally  Circulation: central pulses present and peripheral pulses present  Disability: Pupils - left 4 mm and brisk, right 4 mm and brisk     Estrella Coma Scale - Total 14/15  Eye Response (E): 4  4= spontaneous,  3= to verbal/voice, 2=  to pain, 1= No response   Verbal Response (V): 4   5= Orientated, converses,  4= Confused, converses, 3= Inappropriate words,  2= Incomprehensible sounds,  1=No response   Motor Response (M): 6   6= Obeys commands, 5= Localizes to pain, 4= Withdrawal to pain, 3=Fexion to pain, 2= Extension to pain, 1= No response    Secondary Survey:  General: alert, oriented to person not place or time.   Head: Right frontal scalp hematoma.   Eyes: minimally reactive consistent with surgical history, oblong pupils EOMI, corneas and conjunctivae clear  Ears: no drainage.   Nose: nares patent, no drainage, nasal septum non-tender  Mouth/Throat: no exudates or erythema,  no dental tenderness or malocclusions, no tongue  lacerations  Neck:   No midline posterior tenderness, full AROM without pain or tenderness   Chest/Pulmonary: normal respiratory rate and rhythm,  bilateral clear breath sounds, no wheezes, rales or rhonchi, no chest wall tenderness or deformities,   Cardiovascular: ,  normal and regular rate and rhythm,    Abdomen: soft, non-tender, no guarding, no rebound tenderness and no tenderness to palpation  : normal external genitalia, pelvis stable to lateral compression,  no abreu, no urine assess/  Back/Spine: no deformity, no midline tenderness, no sacral tenderness,  no step-offs and no abrasions or contusions  Musculoskel/Extremities: normal extremities, full AROM of major joints without tenderness, edema, erythema, ecchymosis, or abrasions.  Hand: no gross deformities of hands or fingers. Full AROM of hand and fingers in flexion and extension.  strength equal and symmetric.   Skin: no rashes, laceration, ecchymosis, skin warm and dry.   Neuro: oblong pupils minimally reactive, alert, oriented x 1. CN II-XII grossly intact. No focal deficits. Strength 5/5 x 4 extremities.  Sensation intact.  Psychiatric: perseverates, takes many questions to follow directions.     Data   UA RESULTS:  Recent Labs   Lab Test 09/15/19  1339 04/30/19  2200 12/20/18  1425   COLOR Light Yellow Yellow Yellow   APPEARANCE Clear Clear Clear   URINEGLC Negative Negative Negative   URINEBILI Negative Negative Negative   URINEKETONE Negative Negative Trace*   SG 1.012 1.020 1.010   UBLD Negative Negative Negative   URINEPH 6.5 5.5 7.0   PROTEIN Negative Negative Negative   UROBILINOGEN  --   --  0.2   NITRITE Negative Negative Negative   LEUKEST Negative Moderate* Negative   RBCU  --  2  --    WBCU  --  30*  --           Studies:  CT Head w/o Contrast   Final Result   IMPRESSION:    No acute intracranial abnormality. Stable appearance of small right   occipital subarachnoid hemorrhage.      Right frontal temporal scalp hematoma is also  similar appearance, as   are chronic changes throughout the brain including generalized atrophy   and nonspecific white matter changes that suggest small vessel   disease.      OMEGA RAMIREZ MD      CT Head w/o Contrast   Final Result   IMPRESSION:    Small right occipital subarachnoid hemorrhage, likely related to a   countercoup injury with no significant mass effect effect. No midline   shift.       Examination was discussed with Yael Burgos at 1345 hours 9/15/2019.       Right frontotemporal soft tissue contusion with hematoma.      Moderate generalized      OMEGA RAMIREZ MD      XR Pelvis 1/2 Views    (Results Pending)   XR Chest Port 1 View    (Results Pending)       Huey Colindres

## 2019-09-16 NOTE — PLAN OF CARE
Vitals stable. Alert to self and place at times. Pleasant. Neuro assessments done q4 hours - no changes noted. Pupils irregular and fixed per norm. Equal muscle movement for extremities. Possible tremor/shaking noted at all assessments.  Voids on bedpan and is incontinent at times. No changes in skin bruising. Denies pain when asked. rFLACC score 0. Slept most of shift but on call light frequently when awake. Alarms at all times. No IV access. Call light remains within reach and sometimes makes needs known.

## 2019-09-16 NOTE — PLAN OF CARE
VSS. Pt afebrile. SaO2 high 90s on RA. Initially no c/o pain. Pt was c/o of feeling some discomfort in her head. Pt not able to rate pain on 0-10 scale writer assessed using rFLACC. Score of 3. PRN tylenol administered at 2043. No s/s pain with reassessment.     Neuro checks q4h: no changes revealed. Pupils fixed and irregular per norm. Pt pleasantly confused to place/time/situation but oriented to self. Equal muscle movement for extremities. Pt on call light frequently. Staff frequently redirecting and reorienting pt. Lungs: clear. Abd: soft and nontender. Bowel sounds active and audible. Regular diet tolerated well. : Pt voiding without difficulty. Skin: bruising/swelling noted on R side of forehead. Small abrasion noted as well. Blanchable redness on: R heel, buttocks, and belly button. Pt turn/repo q2h. Bony prominences supported and floated with pillows.    No PIV access. Call light within reach. Alarms activated and audible.

## 2019-09-16 NOTE — PLAN OF CARE
Face to face report given with opportunity to observe patient.    Report given to Veronica Christie RN   9/16/2019  7:00 AM       [Duration: ___ wks] : duration: [unfilled] weeks [] :  [___ lbs.] : [unfilled] lbs [___ oz.] : [unfilled] oz. [Was child in NICU?] : Child was not in NICU

## 2019-09-16 NOTE — PROGRESS NOTES
CT reviewed. Extent and density of occipital SAH unchanged. Reviewed radiology interpretation which correlates findings.

## 2019-09-16 NOTE — PROGRESS NOTES
Medical record and Sylvester Score reviewed. Participated in interdisciplinary rounds.  No apparent needs noted at this time. Care Transitions will remain available if needs arise.        Discharge orders were faxed this morning. Messages x2 left for Nolvia. Attempted to also reach Maura and Unit 1 and Unit 2 nursing staff, but only reached an answering machine.

## 2019-09-16 NOTE — DISCHARGE SUMMARY
"[facility]    Discharge Summary  Hospitalist    Date of Admission:  9/15/2019  Date of Discharge:  9/16/2019  Discharging Provider: Betty Deleon CNP  Date of Service (when I saw the patient): 09/16/19    Discharge Diagnoses     SAH (subarachnoid hemorrhage) (H)    Bradycardia    Fall    History of Present Illness   From admission:Karey Paulson is a 89 year old woman who presents after an unwitnessed fall from her chair at her nursing home.  She has severe dementia and little meaningful history is available.  She does not recall any of the events of her fall.  Nursing home staff noted that initially her confusion was perhaps slightly greater than baseline but subsequently rapidly improved to her prior baseline.  The patient states that today was \"a bad day\" and that she otherwise has felt as well as she usually does.  Evaluation in emergency department included a CT showing right frontal parietal superficial hematoma and a small right occipital area of subarachnoid hemorrhage.  Emergency department staff indicate they spoke with neurosurgery via telephone who recommended in-hospital monitoring and follow-up CT scan.    Hospital Course   Karey Paulson was admitted on 9/15/2019.  The following problems were addressed during her hospitalization:      SAH (subarachnoid hemorrhage) (H): Due to acute head trauma. She suffers from chronic dementia do full neuro exam difficult. She has been alert and interactive through her entire stay. Denies head pain. Follows some commands. Asks appropriate questions though repetitive. Repeat did not show an increase in size of the bleed. Trauma consult completed without any new findings. Discharge back to SNF.       Bradycardia: Known for several months. She did not have any acute events overnight on telemetry. Cardiogenic syncope is possible as she has noted sinus pauses over 3 seconds in the past and has previous history of syncope. She is being seen by Dr. Rao and " should continue same if family wishes.       Fall: As above.     Betty Deleon, CNP      Pending Results     Unresulted Labs Ordered in the Past 30 Days of this Admission     Date and Time Order Name Status Description    9/15/2019 1454 Active MRSA Surveillance Culture Preliminary           Code Status   DNR / DNI       Primary Care Physician   Byron Huerta      Discharge Disposition   Discharged to long-term care facility  Condition at discharge: Stable    Consultations This Hospital Stay   None    Time Spent on this Encounter   I, Betty Deleon NP, personally saw the patient today and spent greater than 30 minutes discharging this patient.    Discharge Orders      General info for SNF    Length of Stay Estimate: Long Term Care  Condition at Discharge: Stable  Level of care:skilled   Rehabilitation Potential: Fair  Admission H&P remains valid and up-to-date: Yes  Recent Chemotherapy: N/A  Use Nursing Home Standing Orders: Yes     Reason for your hospital stay    Fall with head contusion     Follow Up and recommended labs and tests    Follow up with Nursing home physician.  No follow up labs or test are needed.     Activity - Up with nursing assistance     DNR/DNI     Fall precautions     Advance Diet as Tolerated    Follow this diet upon discharge: Orders Placed This Encounter      Regular Diet Adult     Discharge Medications   Current Discharge Medication List      CONTINUE these medications which have NOT CHANGED    Details   acetaminophen (TYLENOL) 500 MG tablet Take 500 mg by mouth 4 times daily      acetaZOLAMIDE (DIAMOX) 250 MG tablet Take 1 tablet (250 mg) by mouth 2 times daily  Qty: 60 tablet, Refills: 5    Associated Diagnoses: Essential hypertension      amLODIPine (NORVASC) 5 MG tablet Take 1 tablet (5 mg) by mouth At Bedtime  Qty: 90 tablet, Refills: 1    Associated Diagnoses: Essential hypertension      ASPIRIN LOW DOSE 81 MG EC tablet TAKE 1 TABLET BY MOUTH EVERY EVENING  Qty: 90  tablet, Refills: 2    Associated Diagnoses: Hyperlipidemia; Essential hypertension, benign      brimonidine-timolol (COMBIGAN) 0.2-0.5 % ophthalmic solution Place 1 drop Into the left eye 2 times daily      latanoprost (XALATAN) 0.005 % ophthalmic solution Place 1 drop into the right eye At Bedtime      losartan (COZAAR) 100 MG tablet Take 1 tablet (100 mg) by mouth daily  Qty: 93 tablet, Refills: 3    Associated Diagnoses: Essential hypertension      melatonin 3 MG tablet TAKE 1 TABLET BY MOUTH NIGHTLY AT BEDTIME  Qty: 100 tablet, Refills: 2    Associated Diagnoses: Primary insomnia      order for DME Equipment being ordered: Wheelchair manual  Qty: 1 each, Refills: 0    Associated Diagnoses: Weakness of both lower extremities; Chronic bilateral low back pain without sciatica      prednisoLONE acetate (PRED FORTE) 1 % ophthalmic suspension Place 1 drop into both eyes daily       ranitidine (ZANTAC) 150 MG capsule Take 150 mg by mouth 2 times daily      ranitidine (ZANTAC) 150 MG tablet Take 1 tablet (150 mg) by mouth 2 times daily  Qty: 180 tablet, Refills: 3    Associated Diagnoses: Gastroesophageal reflux disease without esophagitis      senna-docusate (SENOKOT-S/PERICOLACE) 8.6-50 MG tablet Take 1 tablet by mouth daily      sertraline (ZOLOFT) 25 MG tablet Take 25 mg by mouth daily      SIMBRINZA 1-0.2 % ophthalmic suspension INSTILL ONE DROP IN THE RIGHT EYE THREE TIMES DAILY  Qty: 8 mL, Refills: 3    Comments: This prescription was filled on 7/6/2019. Any refills authorized will be placed on file.  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      Sulfamethoxazole W/Trimethoprim Unknown     Nausea and shaking     Data   Most Recent 3 CBC's:  Recent Labs   Lab Test 09/15/19  1234 04/30/19 2058 12/20/18  1318   WBC 7.1 6.0 8.8   HGB 13.1 11.3* 12.8   MCV 92 91 87    216 290      Most Recent 3 BMP's:  Recent Labs   Lab Test 09/15/19  1234 08/01/19 04/30/19 2058 12/20/18  1318     --  139  --  138   POTASSIUM 3.9 3.9 3.6   < > 2.9*   CHLORIDE 113*  --  110*  --  108*   CO2 25  --  23  --  21   BUN 30  --  28  --  14   CR 0.58 0.67 0.72  --  0.45*   ANIONGAP 5  --  6  --  9   KEILY 9.4  --  9.3  --  9.2   GLC 64* 94 126*  --  143*    < > = values in this interval not displayed.     Most Recent 2 LFT's:  Recent Labs   Lab Test 09/15/19  1234 04/30/19 2058   AST 15 14   ALT 32 21   ALKPHOS 66 56   BILITOTAL 0.4 0.3     Most Recent INR's and Anticoagulation Dosing History:  Anticoagulation Dose History     Recent Dosing and Labs Latest Ref Rng & Units 11/5/2018    INR 0 - 1.3 0.98        Most Recent 3 Troponin's:  Recent Labs   Lab Test 12/13/18  1325 11/05/18  1055 10/07/18  1132   TROPI <0.030 <0.030 <0.030     Most Recent Cholesterol Panel:  Recent Labs   Lab Test 03/26/14  1134   *   HDL 63   TRIG 110     Most Recent 6 Bacteria Isolates From Any Culture (See EPIC Reports for Culture Details):  Recent Labs   Lab Test 09/15/19  1504 04/30/19  2200 12/13/18  1915 12/13/18  1542 11/05/18  1210 11/05/18  1055   CULT Culture in progress 10,000 to 50,000 colonies/mL  Candida albicans  * No MRSA isolated No growth after 6 days  No growth after 6 days >100,000  colonies/mL  Citrobacter braakii  * No growth after 6 days  No growth after 6 days     Most Recent TSH, T4 and A1c Labs:No lab results found.  Results for orders placed or performed during the hospital encounter of 09/15/19   CT Head w/o Contrast    Narrative    PROCEDURE: CT HEAD W/O CONTRAST   9/15/2019 1:21 PM    HISTORY:Female, age,  89 years, , , Altered level of consciousness  (LOC), unexplained; fall at nursing home unwitnessed    COMPARISON: Head CT 4/30/2019    TECHNIQUE: CT of the brain without contrast.    FINDINGS: Ventricles and sulci are prominent in size and shape. Gray  and white matter demonstrate scattered areas of decreased density.    There is a focal area of increased density involving the cortex and  adjacent sulci in the right occipital region. Subcutaneous hematoma in  the right frontal region.    The bones are unremarkable. No fracture.       Impression    IMPRESSION:   Small right occipital subarachnoid hemorrhage, likely related to a  countercoup injury with no significant mass effect effect. No midline  shift.     Examination was discussed with Yael Burgos at 1345 hours 9/15/2019.     Right frontotemporal soft tissue contusion with hematoma.    Moderate generalized    OMEGA RAMIREZ MD   CT Head w/o Contrast    Narrative    PROCEDURE: CT HEAD W/O CONTRAST   9/15/2019 9:31 PM    HISTORY:Female, age,  89 years, , , SAH, proven, follow-up; follow up  serial exam. Limited SAH    COMPARISON: 9/15/2019    TECHNIQUE: CT of the brain without contrast.    FINDINGS: Ventricles and sulci are prominent in size and shape. Gray  and white matter demonstrate scattered areas of decreased density.  Small focus of subarachnoid blood in the right occipital region is  similar in volume. No evidence of new or enlarging hemorrhagic focus.    The bones are unremarkable. No fracture.       Impression    IMPRESSION:   No acute intracranial abnormality. Stable appearance of small right  occipital subarachnoid  hemorrhage.    Right frontal temporal scalp hematoma is also similar appearance, as  are chronic changes throughout the brain including generalized atrophy  and nonspecific white matter changes that suggest small vessel  disease.    OMEGA RAMIREZ MD   XR Chest Port 1 View    Narrative    PROCEDURE:  XR CHEST PORT 1 VW    HISTORY: trauma eval. .    COMPARISON:  12/20/2018.    FINDINGS:    The cardiomediastinal contours are unchanged.  No focal consolidation, effusion or pneumothorax.  No gross bony trauma is identified. Osteopenia/osteoporosis.      Impression    IMPRESSION:  No evidence of gross bony trauma or pneumothorax.      JOCELIN CAUSEY MD   XR Pelvis Port 1/2 Views    Narrative    XR PELVIS PORT 1/2 VW    HISTORY: Trauma eval .    TECHNIQUE: AP pelvis.    COMPARISON: CT abdomen pelvis 12/13/2018.    FINDINGS:    The bones are osteoporotic. The right hip is rotated, limiting  assessment. Advanced bilateral hip osteoarthritis is present. No gross  fracture is identified.        Impression    IMPRESSION:     Limited single view of the pelvis demonstrate no gross fracture. Poor  assessment of the proximal right femur. Consider dedicated right hip  views if necessary.      JOCELIN CAUSEY MD

## 2019-09-16 NOTE — PROGRESS NOTES
Received consult for unwitnessed fall at nursing home with small SAH, will consult for tertiary exam in am.     Huey Colindres MD

## 2019-11-13 PROBLEM — Z86.79 HX OF SUBARACHNOID HEMORRHAGE: Status: ACTIVE | Noted: 2019-01-01

## 2019-11-13 PROBLEM — Z91.81 PERSONAL HISTORY OF FALL: Status: ACTIVE | Noted: 2019-01-01

## 2019-11-13 NOTE — PROGRESS NOTES
Cardiology Progress Note     Assessment & Plan   Karey Paulson is a 89 year old female who is being seen in follow-up to visit on 5/13/19. She had a Zio patch which showed heart rates as low as 32 bpm and pauses at 3.6 seconds with concerns for near syncope.  Pacemaker has been suggested previously but has been declined.    She was seen in the ER on 10/7/2018 and 11/5/2018 with concerns for syncope.  She was seen in the ER on 9/15/2019 after a fall with subarachnoid hemorrhage.    During her first visit on 10/7/2018, she was described as being flushed, warm, sweaty, nauseated and then passed out.  She was incontinent of stool.  She was out for about 20 seconds.  Her heart rate was felt to be in the 40's.  Symptoms were suggestive as being the result of vasovagal syncope.  She had abdominal pain that precipitated her vasovagal event. She has not convinced that she actually passed out with any of these episodes.  She has not any palpitations or irregular heart rates.    She was seen in the ER on 11/5/2018.  She was felt to be without a pulse for approximately a minute.  She had been complaining of shortness of breath and heart problems.  She had been having some lower abdominal pain.  She was felt to have possible syncope.  Her work-up was revealing for UTI, dehydration, first-degree heart block, septal infarct and T wave inversions on EKG.  Her telemetry was largely unremarkable.  She was given Augmentin for UTI.  Chest x-ray was normal.  She was discharged back to [facility].    She was seen in the ER on 9/15/2019 for fall with a subarachnoid hemorrhage.  This fall was unwitnessed. Evaluation in emergency department included a CT showing right frontal parietal superficial hematoma and a small right occipital area of subarachnoid hemorrhage.  She was described as having chronic dementia making history difficult to obtain.  Again, the possibility for cardiogenic syncope remains.    She was seen by her  primary in follow-up on 10/12/18.  She had been sitting when she had a loss of consciousness. She was taken off atenolol.  Her heart rate and blood pressure was noted to be low.  Norvasc was decreased from 10 mg daily down to 5 mg daily.  She was set up for a Zio patch.     She had a Zio patch completed on 10/16/2018.  It showed x1 pause lasting up to 3.6 seconds on 10/19/2019 at 5:34 PM. Her slowest heart rate was 32 bpm at 9:13 AM. There were x3 runs of PSVT lasting up to 17 beats.  There were also episodes of episodes of PSVT and VE.    We discussed the findings of her Zio patch. She was told that recommendations would be a IIa for a pacemaker placement. Based on her episode of syncope, pauses, and bradycardia, she could be considered for pacemaker.    In the past, this was something she was willing to have done, but was never completed. She has not had any more episodes of syncope.    She also has a history of myocardial infarction on 12/14/2010.  She had x3 stents placed to her RCA.  She denies any chest pain, chest tightness, or chest discomfort.    Ultrasound of her carotids were performed on 10/16/2018 with less than 50% stenosis to her right internal carotid artery.    11/13/2019:  Her communication at times was unclear.  At other times, she seemed to be making sense.  The ER report from 9/15/2019 suggest she has severe dementia.  I suspect that this was playing a part in her ability to communicate.  She was not accompanied by any staff or family.  She was seen alone.  As result, no changes were made.  Suspicion remains that she may need a pacemaker.      Impression:  1.  H/O syncope x2 on 10/7/2018 and 11/5/2018 with ER visit.  2.  1st degree heart block.  3.  Sinus pauses at 3.6 seconds at 5:34 PM on 10/19/2019.  4.  Bradycardia as low as 32 bpm on 9/13 a.m.  5.  H/O MI on 12/14/2010  6.  H/O coronary stent placement x3 to the RCA on 12/14/2010.  7.  HTN.  8.  CAD.  9.  Hyperlipidemia.  10.  Generalized  weakness.  11.  GERD.    Plan:  1.  It was difficult obtaining an accurate history as she was not accompanied by anyone.  2.  No changes made.  3.  She will be seen in approximately 6 months follow-up.        Juwan Rao        Physical Exam   Temp: 97.3  F (36.3  C) Temp src: Tympanic BP: 106/67 Pulse: 65     SpO2: 98 %      Vitals:     Vital Signs with Ranges  Temp:  [97.3  F (36.3  C)] 97.3  F (36.3  C)  Pulse:  [65] 65  BP: (106)/(67) 106/67  SpO2:  [98 %] 98 %  ROS is negative except that which was noted in the HPI.     Pressure Injury 12/13/18 Posterior;Medial Coccyx Stage 1 (Active)   Wound Base Erythema, blanchable 12/18/2018  7:56 AM   Lakia-wound Assessment Blanchable erythema 12/18/2018  7:56 AM   Drainage Amount None 12/18/2018  7:56 AM   Wound Care/Cleansing Barrier applied  12/18/2018  7:56 AM   Dressing Protective barrier 12/18/2018  7:56 AM   Number of days: 335       Constitutional: awake, alert, cooperative, no apparent distress, and appears stated age  Eyes: Lids and lashes normal, pupils equal, sclera clear, conjunctiva normal  ENT: Normocephalic, without obvious abnormality, atraumatic.  Respiratory: No increased work of breathing, good air exchange, clear to auscultation bilaterally, no crackles or wheezing  Cardiovascular: Normal apical impulse, regular rate and rhythm, normal S1 and S2, no S3 or S4, and no murmur noted  Musculoskeletal: There is no redness, warmth, or swelling of the joints.  Full range of motion noted.  Motor strength is 5 out of 5 all extremities bilaterally.  Tone is normal.  Neurologic: Awake, alert, oriented to name, place and time.    Neuropsychiatric: General: normal, calm and normal eye contact    Medications          Data   No results found for this or any previous visit (from the past 24 hour(s)).  No results found for this or any previous visit (from the past 24 hour(s)).

## 2019-11-13 NOTE — PATIENT INSTRUCTIONS
You were seen by Dr. Rao, 11/13/2019.     1.  Please continue all medication as prescribed.       2.  If you develop new or worsening symptoms please call the cardiology office as you may need to be seen sooner.    You will follow up with Dr. Rao in 6 months.       Please call the cardiology office with problems, questions, or concerns at [phone].    If you experience chest pain, chest pressure, chest tightness, shortness of breath, fainting, lightheadedness, nausea, vomiting, or other concerning symptoms, please report to the Emergency Department or call 911. These symptoms may be emergent, and best treated in the Emergency Department.       Deena SCHULTZ RN-BSN  Cardiology   [facility]  [phone]

## 2019-11-13 NOTE — NURSING NOTE
"Chief Complaint   Patient presents with     Follow Up     Six month recheck       Initial /67   Pulse 65   Temp 97.3  F (36.3  C) (Tympanic)   SpO2 98%  Estimated body mass index is 16.3 kg/m  as calculated from the following:    Height as of 5/13/19: 1.676 m (5' 6\").    Weight as of 10/30/19: 45.8 kg (101 lb).  Medication Reconciliation: complete  Sherita Pruett LPN  "

## 2019-11-14 PROBLEM — J96.20 ACUTE ON CHRONIC RESPIRATORY FAILURE, UNSPECIFIED WHETHER WITH HYPOXIA OR HYPERCAPNIA (H): Status: ACTIVE | Noted: 2018-01-01

## 2019-11-14 PROBLEM — Z98.890 HX OF CARDIAC CATH: Status: ACTIVE | Noted: 2018-01-01

## 2019-11-14 PROBLEM — J96.01 ACUTE RESPIRATORY FAILURE WITH HYPOXIA (H): Status: ACTIVE | Noted: 2019-01-01

## 2019-11-14 PROBLEM — E87.29 RESPIRATORY ACIDOSIS: Status: ACTIVE | Noted: 2019-01-01

## 2019-11-14 PROBLEM — I10 ESSENTIAL HYPERTENSION: Status: ACTIVE | Noted: 2018-01-01

## 2019-11-14 PROBLEM — J18.9 PNEUMONIA OF BOTH LUNGS DUE TO INFECTIOUS ORGANISM: Status: ACTIVE | Noted: 2019-01-01

## 2019-11-14 PROBLEM — F03.90 DEMENTIA WITHOUT BEHAVIORAL DISTURBANCE (H): Status: ACTIVE | Noted: 2019-01-01

## 2019-11-14 PROBLEM — J96.90 RESPIRATORY FAILURE (H): Status: ACTIVE | Noted: 2019-01-01

## 2019-11-14 NOTE — ED NOTES
Octavia, nurse from [facility] called for an update, was advised that patient will be admitted.

## 2019-11-14 NOTE — ED NOTES
DATE:  11/14/2019   TIME OF RECEIPT FROM LAB:  0252  LAB TEST:  Lactic  LAB VALUE:  3.0 (Significant)  RESULTS GIVEN WITH READ-BACK TO (PROVIDER):  Dr. Gonzales  TIME LAB VALUE REPORTED TO PROVIDER:   5479

## 2019-11-14 NOTE — PROGRESS NOTES
Spoke with patient's grand-daughter Elba. Discussed her grave condition and goals of care. She has been DNR/DNI for a long time. When discussing her goals of care, Brianna states she does not want anything that would not change the outcome or improve her quality of life. After discussion it is decided that we would no longer agressively treat with frequent lab draws, Bipap, ABG's, pressors but would for the time being continue conservative measures of high flow oxygen, antibiotics and fluids to see if she can improve over the next 24-48 hours. If she deteriorates or does not show signs of improvement in the next 24-48 hours would then focus on comfort cares.

## 2019-11-14 NOTE — PLAN OF CARE
Pt off of BiPAP this morning and now on 1lpm NC. Blood pressures now stable but were soft at the beginning of the day. Denies pain and has been resting comfortably. Lung sounds clear this morning with coarse breath sounds in the bases this evening. Pt has a weak cough. Turned and repositioned. Bladder scan for greater than 615. Dowling catheter placed for retention. Family at bedside this afternoon. Attempted to feed the pt this evening but pt spit her food out after two bites. Offering fluids but pt continues to refuse. Turn and repositioned q2h.     Face to face report given with opportunity to observe patient.    Report given to HAMLET Morales RN   11/14/2019  7:52 PM

## 2019-11-14 NOTE — PROGRESS NOTES
Paula from Dr Randall's office said Dr. Randall will do the pre-op physical on the DOS.   Pt arrived in ER SOB. Sats in the 60s. Started on a nrb mask. sats still in 70s. Bipap started on setting 12/7 10 100%. ABG drawn 7.18/58/62/84. Neb given.

## 2019-11-14 NOTE — PROGRESS NOTES
Care Transitions focused note:    Talked briefly with grand dtr Elba. She expects that, if [facility], [facility].     We reviewed the Medicare IM letter.     CTS will remain available.

## 2019-11-14 NOTE — PHARMACY
[facility]    Pharmacy    Antimicrobial Stewardship Note     Current antimicrobial therapy:  Anti-infectives (From now, onward)    Start     Dose/Rate Route Frequency Ordered Stop    11/14/19 0745  doxycycline (VIBRAMYCIN) 100 mg in D5W 250 mL intermittent infusion      100 mg  125-250 mL/hr over 1-2 Hours Intravenous EVERY 12 HOURS 11/14/19 0603      11/14/19 0700  cefTRIAXone in d5w (ROCEPHIN) intermittent infusion 1 g      1 g  over 30 Minutes Intravenous EVERY 24 HOURS 11/14/19 0603          Indication: CAP    Days of Therapy: 1     Pertinent labs:  Creatinine   Creatinine   Date Value Ref Range Status   11/14/2019 0.61 0.52 - 1.04 mg/dL Final   09/15/2019 0.58 0.52 - 1.04 mg/dL Final   08/01/2019 0.67 0.40 - 1.00 mg/dL Final     WBC   WBC   Date Value Ref Range Status   11/14/2019 8.8 4.0 - 11.0 10e9/L Final   11/14/2019 13.1 (H) 4.0 - 11.0 10e9/L Final   09/15/2019 7.1 4.0 - 11.0 10e9/L Final     Procalcitonin   Procalcitonin   Date Value Ref Range Status   11/14/2019 12.98 (HH) ng/ml Final     Comment:     >/= 10.00 ng/ml   Very high likelihood of severe sepsis or septic shock.  Recommendation: Strongly recommend initiating or continuing antibiotics.   Evaluate culture results and clinical features to target antibacterial   therapy. Obtain blood cultures and other relevant cultures if not done.Repeat   PCT in 2 days to guide antibiotic de-escalation. Consider de-escalating   antibiotics when PCT concentration is <80% of peak or abs PCT <1.  Critical Value called to and read back by  WILLIAM TEIXEIRA 0828 11/14/19 JS       CRP   CRP Inflammation   Date Value Ref Range Status   11/14/2019 32.6 (H) 0.0 - 8.0 mg/L Final     Culture Results:        Recommendations/Interventions:  1. No recommendations at this time. Will continue to monitor.     Karla Marino RPH  November 14, 2019

## 2019-11-14 NOTE — ED PROVIDER NOTES
History     Chief Complaint   Patient presents with     Altered Mental Status     The history is provided by the nursing home, the EMS personnel and a relative.   Shortness of Breath   Severity:  Severe  Onset quality:  Unable to specify  Timing:  Constant  Progression:  Worsening  Chronicity:  New  Relieved by:  Nothing  Associated symptoms: fever          Allergies:  Allergies   Allergen Reactions     Atorvastatin Other (See Comments) and Nausea and Vomiting     Myalgia     Ciprofloxacin Other (See Comments)     Erythromycin      Lisinopril Cough     Simvastatin Other (See Comments)     Achey muscles.      Sulfamethoxazole W/Trimethoprim Unknown     Nausea and shaking       Problem List:    Patient Active Problem List    Diagnosis Date Noted     Acute respiratory failure with hypoxia (H) 11/14/2019     Priority: Medium     Respiratory acidosis 11/14/2019     Priority: Medium     Hx of subarachnoid hemorrhage on 9/15/2019 11/13/2019     Priority: Medium     Personal history of fall 11/13/2019     Priority: Medium     History of bradycardia 09/16/2019     Priority: Medium     Nursing Home Visit 05/30/2019     Priority: Medium     Acute on chronic respiratory failure, unspecified whether with hypoxia or hypercapnia (H) 12/14/2018     Priority: Medium     Coronary artery disease involving native coronary artery of native heart without angina pectoris 12/13/2018     Priority: Medium     Sinus pause at 3.6 seconds at 5:34 PM on 10/19/2019 12/04/2018     Priority: Medium     History of myocardial infarction 12/14/10 12/04/2018     Priority: Medium     Essential hypertension 12/04/2018     Priority: Medium     Hx of cardiac cath 12/14/10 with x3 stents to the RCA 12/04/2018     Priority: Medium     First degree heart block 12/04/2018     Priority: Medium     Generalized muscle weakness 12/04/2018     Priority: Medium     Bradycardia 11/08/2018     Priority: Medium     H/O syncope x2 on 10/7/2018 and 11/5/2018 10/12/2018      Priority: Medium     Primary insomnia 04/11/2018     Priority: Medium     S/P laparoscopic colectomy 11/14/2017     Priority: Medium     Diverticulosis of large intestine 10/22/2017     Priority: Medium     Overview:   S/p fle sig, 10/22/2017       Stricture of sigmoid colon (H) 10/22/2017     Priority: Medium     Overview:   S/p flex sig 10/22/2017       [place]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     Priority: Medium     Overview:        Recurrent UTI 10/21/2017     Priority: Medium     Overview:   2 in 2017, 10/13/2017 and 6/28/2017       Pancreatic cyst 10/20/2017     Priority: Medium     Overview:   Cystic nodules, multiple; s/p CT Abd Pelv       GERD 12/31/2015     Priority: Medium     Actinic keratosis 04/18/2012     Priority: Medium     Osteoarthrosis 11/08/2011     Priority: Medium     Mixed hyperlipidemia 01/29/2009     Priority: Medium     Overview:   Overview:   IMO Update 10/11       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     Priority: Medium        Past Medical History:    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      Acute myocardial infarction (H)      Cyst of pancreas      Diaphragmatic hernia without obstruction or gangrene      Diverticulosis of large intestine without perforation or abscess without bleeding      Edema      Epigastric pain      Essential (primary) hypertension      Gastro-esophageal reflux disease without esophagitis      Osteoarthritis      Other intestinal obstruction unspecified as to partial versus complete obstruction (CODE)      Personal history of malignant neoplasm of breast      Personal history of other medical treatment (CODE)      Postmenopausal atrophic vaginitis      Sepsis (H)      Urinary tract infection      Urinary tract infection        Past Surgical History:    Past Surgical History:   Procedure Laterality Date     ANGIOPLASTY      12-,Angioplasty with 3 bare-metal stents RCA      APPENDECTOMY OPEN      1948,Appendectomy     CHOLECYSTECTOMY      1995,[facility]     EXTRACAPSULAR CATARACT EXTRATION WITH INTRAOCULAR LENS IMPLANT      12/2016,[facility]at     HYSTERECTOMY TOTAL ABDOMINAL      1967,[facility], ovaries remaining     MASTECTOMY SIMPLE      1993,[facility]     OTHER SURGICAL HISTORY      10/24/2017,[id number],OTHER,Ellen Arambula and Jairo       Family History:    Family History   Problem Relation Age of Onset     Breast Cancer No family hx of         Cancer-breast       Social History:  Marital Status:   [5]  Social History     Tobacco Use     Smoking status: Never Smoker     Smokeless tobacco: Never Used   Substance Use Topics     Alcohol use: No     Alcohol/week: 0.0 standard drinks     Drug use: No     Comment: Drug use: No        Medications:    acetaZOLAMIDE (DIAMOX) 250 MG tablet  amLODIPine (NORVASC) 5 MG tablet  aspirin (ASA) 325 MG EC tablet  brimonidine-timolol (COMBIGAN) 0.2-0.5 % ophthalmic solution  latanoprost (XALATAN) 0.005 % ophthalmic solution  losartan (COZAAR) 100 MG tablet  melatonin 3 MG tablet  prednisoLONE acetate (PRED FORTE) 1 % ophthalmic suspension  QUEtiapine (SEROQUEL) 25 MG tablet  ranitidine (ZANTAC) 150 MG tablet  senna-docusate (SENOKOT-S/PERICOLACE) 8.6-50 MG tablet  sertraline (ZOLOFT) 25 MG tablet  SIMBRINZA 1-0.2 % ophthalmic suspension  timolol maleate (TIMOPTIC) 0.5 % ophthalmic solution  VITAMIN D3 1000 UNITS tablet  order for DME          Review of Systems   Unable to perform ROS: Dementia   Constitutional: Positive for fever.   Respiratory: Positive for shortness of breath.        Physical Exam   BP: 146/77  Pulse: 105  Heart Rate: 109  Temp: 100.3  F (37.9  C)  Resp: (!) 30  SpO2: (!) 75 %      Physical Exam  Vitals signs and nursing note reviewed.   Constitutional:       General: She is in acute distress.      Appearance: She is ill-appearing and toxic-appearing. She is not diaphoretic.   HENT:      Head: Normocephalic and atraumatic.      Mouth/Throat:       Pharynx: No oropharyngeal exudate.   Eyes:      General: No scleral icterus.     Conjunctiva/sclera: Conjunctivae normal.      Pupils: Pupils are equal, round, and reactive to light.   Neck:      Musculoskeletal: Normal range of motion and neck supple.      Thyroid: No thyromegaly.      Vascular: No JVD.      Trachea: No tracheal deviation.   Cardiovascular:      Rate and Rhythm: Regular rhythm. Tachycardia present.      Heart sounds: Normal heart sounds. No murmur. No friction rub. No gallop.    Pulmonary:      Effort: Respiratory distress present.      Breath sounds: No stridor. Rales present. No wheezing.   Chest:      Chest wall: No tenderness.   Abdominal:      General: Bowel sounds are normal. There is no distension.      Palpations: Abdomen is soft. There is no mass.      Tenderness: There is no abdominal tenderness. There is no guarding or rebound.   Musculoskeletal: Normal range of motion.         General: No tenderness.   Lymphadenopathy:      Cervical: No cervical adenopathy.   Skin:     General: Skin is warm and dry.      Coloration: Skin is not pale.      Findings: No erythema or rash.   Neurological:      Mental Status: She is lethargic.   Psychiatric:         Behavior: Behavior normal.         ED Course        Procedures              Results for orders placed or performed during the hospital encounter of 11/14/19 (from the past 24 hour(s))   CBC with platelets differential   Result Value Ref Range    WBC 13.1 (H) 4.0 - 11.0 10e9/L    RBC Count 4.84 3.8 - 5.2 10e12/L    Hemoglobin 13.9 11.7 - 15.7 g/dL    Hematocrit 43.2 35.0 - 47.0 %    MCV 89 78 - 100 fl    MCH 28.7 26.5 - 33.0 pg    MCHC 32.2 31.5 - 36.5 g/dL    RDW 14.4 10.0 - 15.0 %    Platelet Count 263 150 - 450 10e9/L    Diff Method Automated Method     % Neutrophils 79.0 %    % Lymphocytes 13.6 %    % Monocytes 6.6 %    % Eosinophils 0.0 %    % Basophils 0.5 %    % Immature Granulocytes 0.3 %    Nucleated RBCs 0 0 /100    Absolute Neutrophil  10.4 (H) 1.6 - 8.3 10e9/L    Absolute Lymphocytes 1.8 0.8 - 5.3 10e9/L    Absolute Monocytes 0.9 0.0 - 1.3 10e9/L    Absolute Eosinophils 0.0 0.0 - 0.7 10e9/L    Absolute Basophils 0.1 0.0 - 0.2 10e9/L    Abs Immature Granulocytes 0.0 0 - 0.4 10e9/L    Absolute Nucleated RBC 0.0    Comprehensive metabolic panel   Result Value Ref Range    Sodium 143 133 - 144 mmol/L    Potassium 3.4 3.4 - 5.3 mmol/L    Chloride 114 (H) 94 - 109 mmol/L    Carbon Dioxide 21 20 - 32 mmol/L    Anion Gap 8 3 - 14 mmol/L    Glucose 242 (H) 70 - 99 mg/dL    Urea Nitrogen 33 (H) 7 - 30 mg/dL    Creatinine 0.61 0.52 - 1.04 mg/dL    GFR Estimate 77 >60 mL/min/[1.73_m2]    GFR Estimate If Black 89 >60 mL/min/[1.73_m2]    Calcium 9.5 8.5 - 10.1 mg/dL    Bilirubin Total 0.6 0.2 - 1.3 mg/dL    Albumin 3.2 (L) 3.4 - 5.0 g/dL    Protein Total 7.0 6.8 - 8.8 g/dL    Alkaline Phosphatase 66 40 - 150 U/L    ALT 26 0 - 50 U/L    AST 32 0 - 45 U/L   Troponin I   Result Value Ref Range    Troponin I ES <0.015 0.000 - 0.045 ug/L   Nt probnp inpatient   Result Value Ref Range    N-Terminal Pro BNP Inpatient 189 0 - 1,800 pg/mL   Lactic acid whole blood   Result Value Ref Range    Lactic Acid 3.0 (H) 0.7 - 2.0 mmol/L   CRP inflammation   Result Value Ref Range    CRP Inflammation 32.6 (H) 0.0 - 8.0 mg/L   UA with Microscopic reflex to Culture   Result Value Ref Range    Color Urine Yellow     Appearance Urine Clear     Glucose Urine Negative NEG^Negative mg/dL    Bilirubin Urine Negative NEG^Negative    Ketones Urine Negative NEG^Negative mg/dL    Specific Gravity Urine 1.020 1.003 - 1.035    Blood Urine Trace (A) NEG^Negative    pH Urine 6.0 4.7 - 8.0 pH    Protein Albumin Urine Negative NEG^Negative mg/dL    Urobilinogen mg/dL Normal 0.0 - 2.0 mg/dL    Nitrite Urine Negative NEG^Negative    Leukocyte Esterase Urine Negative NEG^Negative    Source Catheterized Urine     WBC Urine 3 0 - 5 /HPF    RBC Urine 7 (H) 0 - 2 /HPF    Bacteria Urine None (A)  NEG^Negative /HPF    Yeast Urine Few (A) NEG^Negative /HPF    Mucous Urine Present (A) NEG^Negative /LPF   Blood gas arterial and oxyhgb   Result Value Ref Range    pH Arterial 7.18 (L) 7.35 - 7.45 pH    pCO2 Arterial 58 (H) 35 - 45 mm Hg    pO2 Arterial 62 (L) 80 - 105 mm Hg    Bicarbonate Arterial 21 21 - 28 mmol/L    FIO2 100%     Oxyhemoglobin Arterial 84 (L) 92 - 100 %    Base Deficit Art 7.8 mmol/L       Medications   0.9% sodium chloride BOLUS (has no administration in time range)   ipratropium - albuterol 0.5 mg/2.5 mg/3 mL (DUONEB) neb solution 3 mL (3 mLs Nebulization Given 11/14/19 6820)   vancomycin (VANCOCIN) 1000 mg in dextrose 5% 200 mL PREMIX (1,000 mg Intravenous New Bag 11/14/19 8581)   piperacillin-tazobactam (ZOSYN) infusion 3.375 g (0 g Intravenous Stopped 11/14/19 9201)       Assessments & Plan (with Medical Decision Making)   Sent from NH for respiratory distress, low graded fever, AMS  Advanced dementia, DNR, DNI  spo2 72 on NRBM, improved to 90 after placing BIpap and respiratory distress resolved  ABD: PH 7.1, CO2 58  CXR : LLL opacity, infiltration likely aspiration  EKG: AF RVR, ST elevation in V2 -v5 are similar to old EKG  Labs reviewed  vanc zosyn given  Spoke to Dr mitchell, accepted for ICU admission    I have reviewed the nursing notes.    I have reviewed the findings, diagnosis, plan and need for follow up with the patient.      New Prescriptions    No medications on file       Final diagnoses:   Acute respiratory failure with hypercapnia (H)   HCAP (healthcare-associated pneumonia)   Atrial fibrillation with RVR (H)       11/14/2019   [facility]     Best Gonzales MD  11/14/19 3895

## 2019-11-14 NOTE — PROGRESS NOTES
[facility]    Hospitalist Progress Note    Date of Service (when I saw the patient): 11/14/2019    Assessment & Plan       Acute respiratory failure with hypoxia (H): Severe, Sats 70% on arrival to the ED. Akiko to underlying pneumonia. She was placed on Bipap there and since then has improved with less oxygen requirements. Will trial off the bipap.       Respiratory acidosis: Due to #1, bilateral pneumonia-aspiration possible. PH 7.18 on arrival, improved to 7.25 on recheck.         Pneumonia of both lungs due to infectious organism      Severe sepsis: Presented with lactic acidosis, metabolic encephalopathy, mild leukocytosis, tachycardia, fever, procal 12.98. She was given 1.5 liters of fluid-pressures improved, She was given dose of vanco and zosyn. She was transitioned to Rocephin and doxycycline as she has macrolid allergy. Cannot rule out aspiration as initial cause though CXR does not have clear signs of acute aspiration event. Will hold off on steroids as she has no wheezing or history of COPD. Her condition is grave, she had prolonged period of hypoxia prior to arrival and severe acidosis on arrival. Will discuss goals of care with her granddaughter who is her emergency contact.       Bradycardia: Chronic, She has a history of syncope and pauses. In hospital 9/2019 due to syncope and fall that resulted in subdural hematoma. Evaluated by cardiology 10/19 and he recommended a pacemaker to prevent further incidences, however there was no family with her at the time and so I do not if they were planning on pursuing that or not.       Essential hypertension: Pressures soft on arrival, received IVF for sepsis, now more stable. Hold home medications at this time.       Dementia without behavioral disturbance (H): Baseline confusion. This morning she will respond to questions but only to repeat that she is cold.       DVT Prophylaxis: Enoxaparin (Lovenox) SQ  Code Status: DNR/DNI    Disposition: Expected  discharge in several days once improved or .    Betty Deleon, CNP    Interval History   Responds to voice but only to say she is cold, does not follow commands or respond to yes/no questions appropriately.     -Data reviewed today: I reviewed all new labs and imaging results over the last 24 hours.    Physical Exam   Temp: 97.2  F (36.2  C) Temp src: Tympanic BP: 91/51 Pulse: 64 Heart Rate: 66 Resp: 21 SpO2: 97 % O2 Device: Nasal cannula Oxygen Delivery: 3 LPM(decreased to 1L)  Vitals:    19 0630   Weight: 45 kg (99 lb 3.3 oz)     Vital Signs with Ranges  Temp:  [97.2  F (36.2  C)-100.3  F (37.9  C)] 97.2  F (36.2  C)  Pulse:  [] 64  Heart Rate:  [] 66  Resp:  [21-30] 21  BP: ()/(32-77) 91/51  FiO2 (%):  [50 %-60 %] 50 %  SpO2:  [75 %-99 %] 97 %  I/O last 3 completed shifts:  In: 8 [I.V.:448; IV Piggyback:1500]  Out: -     Peripheral IV 19 Right Lower forearm (Active)   Site Assessment WDL 2019 10:00 AM   Line Status Checked every 1-2 hour;Infusing 2019 10:00 AM   Phlebitis Scale 0-->no symptoms 2019 10:00 AM   Infiltration Scale 0 2019 10:00 AM   Dressing Intervention New dressing  2019  2:30 AM   Number of days: 0       Pressure Injury 19 Posterior;Medial Coccyx Stage 1 (Active)   Wound Base Erythema, blanchable 2019  8:00 AM   Lakia-wound Assessment Blanchable erythema 2019  8:00 AM   Drainage Amount None 2019  8:00 AM   Dressing Open to air 2019  8:00 AM   Number of days: 0       Pressure Injury 19 Toe (Comment which one) (Active)   Wound Base Slough;White 2019  8:00 AM   Lakia-wound Assessment Blanchable erythema 2019  8:00 AM   Dressing Foam 2019  8:00 AM   Number of days: 0     Line/device assessment(s) completed for medical necessity    Constitutional: Sleeps unless aroused, open eyes minimally, pal and ill appearing  Respiratory: Clear but course and diminished throughout, she is  moving air surprisingly well, no wheezes or rhonchi, Right is more diminished than left.  Cardiovascular: HRR, no murmurs, rubs,thrills  GI: Soft, does not react to palpation of abdomen  Skin/Integumen: No rashes, bruising or open areas.        Medications     sodium chloride 150 mL/hr at 11/14/19 0751       sodium chloride 0.9%  1,000 mL Intravenous Once     brinzolamide-brimonidine  1 drop Right Eye TID     cefTRIAXone  1 g Intravenous Q24H     doxycycline  intermittent infusion  100 mg Intravenous Q12H     enoxaparin ANTICOAGULANT  40 mg Subcutaneous Q24H     latanoprost  1 drop Right Eye At Bedtime     prednisoLONE acetate  1 drop Both Eyes Daily     timolol maleate  1 drop Right Eye BID       Data   Recent Labs   Lab 11/14/19  0752 11/14/19  0231   WBC 8.8 13.1*   HGB 13.2 13.9   MCV 90 89    263   NA  --  143   POTASSIUM  --  3.4   CHLORIDE  --  114*   CO2  --  21   BUN  --  33*   CR  --  0.61   ANIONGAP  --  8   KEILY  --  9.5   GLC  --  242*   ALBUMIN  --  3.2*   PROTTOTAL  --  7.0   BILITOTAL  --  0.6   ALKPHOS  --  66   ALT  --  26   AST  --  32   TROPI  --  <0.015       Recent Results (from the past 24 hour(s))   XR Chest Port 1 View    Narrative    Procedure:XR CHEST PORT 1 VW    Clinical history:Female, 89 years, sob    Technique: Single view was obtained.    Comparison: 9/16/2019    Findings: The cardiac silhouette is normal. The pulmonary vasculature  is normal.    The lungs demonstrate atelectasis at the left lung base and slight  increased density in the retrocardiac region. Right lung is clear.  Bony structures again demonstrate severe glenohumeral joint  degenerative change.      Impression    Impression:   Left basilar atelectasis and suggestion of retrocardiac pneumonia    OMEGA RAMIREZ MD   XR Chest Port 1 View    Narrative    Procedure:XR CHEST PORT 1 VW    Clinical history:Female, 89 years, respiratory failure    Technique: Single view was obtained.    Comparison: 11/14/2019,  9/16/2019 and 12/20/2018    Findings: The cardiac silhouette is normal. The pulmonary vasculature  is normal.    The lungs are hyperinflated and demonstrate an area of increased  density in the retrocardiac region. Bony structures severe  degenerative changes in the shoulders.      Impression    Impression:   Persistent hyperinflation of the lungs with suggestion of a  retrocardiac pneumonia.     OMEGA RAMIREZ MD

## 2019-11-14 NOTE — H&P
[facility]    History and Physical  Hospitalist       Date of Admission:  11/14/2019    Assessment & Plan   Karey Paulson is a 89 year old female with PMH of respiratory failure, CAD, MI, cardiac syncope, SAH (9.2019), presents with altered mental status and respiratory failure. History or syncope, symptomatic bradycardia (sinus pauses at 3.6 sec). PPM recommended by pt refused.     Active Problems:  Acute on chronic respiratory failure, unspecified whether with hypoxia or hypercapnia (H)    Assessment: Etiology not clear.  Aspiration possible based on her clinical presentation and physical exam.  Does not have signs or symptoms of congestive heart failure with normal BNP and relatively well preserved ejection fraction based on my bedside echo exam.    Plan: Patient is DNR and we will provide BiPAP for respiratory support   Nebulizers, Solu-Medrol, and doxycycline with Rocephin.   We will check ABGs 5 AM.      Essential hypertension    Assessment: Treated with calcium channel blockers and losartan.    Plan: Hold both because she is on BiPAP      Hx of cardiac cath 12/14/10 with x3 stents to the RCA    Assessment: Patient's troponin negative on admission    Plan: No intervention required      Hx of subarachnoid hemorrhage on 9/15/2019    Assessment: 9/15/2019 CT report showed right frontal parietal superficial hematoma and a small right occipital area of subarachnoid hemorrhage.  At that time neurosurgery was consulted via the phone and recommendation was in the hospital monitor recommend follow-up CT.  She was discharged next day when CT showed no progression.    Plan: I believe she does not need aspirin because of the frequent falls intracranial hemorrhage.  I will stop aspirin.      Respiratory acidosis    Assessment: Due to respiratory failure    Plan: BiPAP.  Will titrate pressures and oxygen at least to have 92% duration.    Very advanced dementia, Alzheimer type  Assessment: Present on  admission  Plan: Supportive care.      DVT Prophylaxis: Enoxaparin (Lovenox) SQ  Code Status: DNR / DNI    Disposition: Expected discharge in a few days once respiratory status stable. Prognosis is guarded per my admission impression.     Radha Evans    Primary Care Physician   Byron Huerta    Chief Complaint unable to obtain.    Reason for Admission: Acute respiratory failure.  Possible aspiration.    Admission status: Inpatient care, ICU.    History is obtained from the electronic health record, emergency department physician and granddaughter.    History of Present Illness Very limited because information from nursing home and patient's family is minimal.  Karey Paulson is a 89 year old woman from [facility], with past medical history significant for subarachnoid hemorrhage 9/2019, bradycardia, cardiogenic syncope due to sinus arrest symptomatic bradycardia, MI 2010, peripheral vascular disease with R ICA stenosis more than 50% who was brought with respiratory distress.  Patient's granddaughter told me that patient was found unresponsive and EMS was called.  When EMS arrived saturations found 60%.  Started on nonrebreather mask.  Sats increased to 70%.  She was brought to emergency room she was started on BiPAP with pressures 12.7 and 100% FiO2.  ABGs obtained and pH was 7.18/50 8/62.  This is on 100% BiPAP.  She was given nebulizers, antibiotics.  So the patient was on BiPAP, still in respiratory distress but according to emergency room physician her breathing pattern change she is more, she is not working as hard to breathe as she was when she presented.  Was able to follow simple commands and tolerating BiPAP.    ED events: Patient improved    ED diagnostic workup: Chemistry, hematology, x-ray chest    ED imaging studies and blood test results: Chemistry shows lactic acid 3 CRP 32.6, , and troponin I0.015.  Arterial blood gas showed pH 7.12 PCO2 58 BNP 8 O2 6200% FiO2.  Pathology: White  blood cell count 13.1 the rest is within normal limits.  XR chest: possible LLL alveolar process    ED treatment: Nebulizers, Zosyn, and vancomycin, each 1 dose.    Past Medical History    I have reviewed this patient's medical history and updated it with pertinent information if needed.   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     2017     Acute myocardial infarction (H)     2010     Cyst of pancreas     10/20/2017,Cystic nodules, multiple; s/p CT Abd Pelv     Diaphragmatic hernia without obstruction or gangrene     10/20/2017,s/p CT Abd/Pelv     Diverticulosis of large intestine without perforation or abscess without bleeding     10/22/2017,S/p fle sig, 10/22/2017     Edema     2011     Epigastric pain     2015     Essential (primary) hypertension     No Comments Provided     Gastro-esophageal reflux disease without esophagitis     2015     Osteoarthritis     2011     Other intestinal obstruction unspecified as to partial versus complete obstruction (CODE)     10/22/2017,S/p flex sig 10/22/2017     Personal history of malignant neoplasm of breast     Questionable 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     Personal history of other medical treatment (CODE)          Postmenopausal atrophic vaginitis     2012     Sepsis (H)     2017,E coli bacteremia; E coli UTI (resistant to Cipro)     Urinary tract infection     2017,Resistant to cipro     Urinary tract infection     10/21/2017,2 in , 10/13/2017 and 2017       Past Surgical History   I have reviewed this patient's surgical history and updated it with pertinent information if needed.  Past Surgical History:   Procedure Laterality Date     ANGIOPLASTY      2010,Angioplasty with 3 bare-metal stents RCA     APPENDECTOMY OPEN      ,Appendectomy     CHOLECYSTECTOMY      ,Rosie     EXTRACAPSULAR CATARACT EXTRATION WITH INTRAOCULAR LENS IMPLANT       12/2016,bilat     HYSTERECTOMY TOTAL ABDOMINAL      1967,RUEL, ovaries remaining     MASTECTOMY SIMPLE      1993,[place]     OTHER SURGICAL HISTORY      10/24/2017,[id number],OTHER,Ellen Arambula and J[place]       Prior to Admission Medications   Prior to Admission Medications   Prescriptions Last Dose Informant Patient Reported? Taking?   QUEtiapine (SEROQUEL) 25 MG tablet 11/13/2019 at 2000  Yes Yes   Sig: Take 25 mg by mouth daily   SIMBRINZA 1-0.2 % ophthalmic suspension 11/13/2019 at [address] [place] [address] No Yes   Sig: INSTILL ONE DROP IN THE RIGHT EYE THREE TIMES DAILY   VITAMIN D3 1000 UNITS tablet 11/13/2019 at 0900 [facility] Yes Yes   Sig: TAKE 1 TABLET BY MOUTH ONCE DAILY (IN THE MORNING)   acetaZOLAMIDE (DIAMOX) 250 MG tablet 11/13/2019 at [address] [place] [address] No Yes   Sig: Take 1 tablet (250 mg) by mouth 2 times daily   amLODIPine (NORVASC) 5 MG tablet 11/13/2019 at [address] [place] [address] No Yes   Sig: Take 1 tablet (5 mg) by mouth At Bedtime   aspirin (ASA) 325 MG EC tablet 11/13/2019 at 0900  Yes Yes   Sig: Take 325 mg by mouth daily   brimonidine-timolol (COMBIGAN) 0.2-0.5 % ophthalmic solution 11/13/2019 at [address] [place] [address] Yes Yes   Sig: Place 1 drop Into the left eye 2 times daily   latanoprost (XALATAN) 0.005 % ophthalmic solution 11/13/2019 at [address] [place] [address] Yes Yes   Sig: Place 1 drop into the right eye At Bedtime   losartan (COZAAR) 100 MG tablet 11/13/2019 at 0900 [facility] No Yes   Sig: Take 1 tablet (100 mg) by mouth daily   melatonin 3 MG tablet 11/13/2019 at [address] [place] [address] No Yes   Sig: TAKE 1 TABLET BY MOUTH NIGHTLY AT BEDTIME   [place] [facility] No No   Sig: Equipment being ordered: Wheelchair manual   prednisoLONE acetate (PRED FORTE) 1 % ophthalmic suspension 11/13/2019 at 0800 [facility] Yes Yes   Sig: Place 1 drop into both eyes daily    ranitidine (ZANTAC) 150 MG tablet 11/13/2019 at [address] [place] [address] No Yes   Sig: Take 1 tablet (150 mg) by mouth 2 times daily   senna-docusate  (SENOKOT-S/PERICOLACE) 8.6-50 MG tablet 11/13/2019 at 0900 group home Yes Yes   Sig: Take 1 tablet by mouth daily   sertraline (ZOLOFT) 25 MG tablet 11/13/2019 at 2000 Nursing Home Yes Yes   Sig: Take 25 mg by mouth daily   timolol maleate (TIMOPTIC) 0.5 % ophthalmic solution 11/13/2019 at 2000 Nursing Home No Yes   Sig: INSTILL ONE DROP IN THE RIGHT EYE TWICE DAILY. wait FIVE MINUTES between drops      Facility-Administered Medications: None     Allergies   Allergies   Allergen Reactions     Atorvastatin Other (See Comments) and Nausea and Vomiting     Myalgia     Ciprofloxacin Other (See Comments)     Erythromycin      Lisinopril Cough     Simvastatin Other (See Comments)     Achey muscles.      Sulfamethoxazole W/Trimethoprim Unknown     Nausea and shaking       Social History   I have reviewed this patient's social history and updated it with pertinent information if needed. Karey Paulson  reports that she has never smoked. She has never used smokeless tobacco. She reports that she does not drink alcohol or use drugs.    Family History   I have reviewed this patient's family history and updated it with pertinent information if needed.   Family History   Problem Relation Age of Onset     Breast Cancer No family hx of         Cancer-breast       Review of Systems   Unable to obtain due to dementia and respiratory distress (patient on BiPAP).     Physical Exam   Temp: 100.3  F (37.9  C) Temp src: Tympanic BP: (!) 84/42 Pulse: 81 Heart Rate: 79 Resp: (!) 30 SpO2: 94 % O2 Device: BiPAP/CPAP Oxygen Delivery: 15 LPM  Vital Signs with Ranges  Temp:  [97.3  F (36.3  C)-100.3  F (37.9  C)] 100.3  F (37.9  C)  Pulse:  [] 81  Heart Rate:  [] 79  Resp:  [30] 30  BP: ()/(42-77) 84/42  SpO2:  [75 %-98 %] 94 %  0 lbs 0 oz    Physical exam:   GENERAL: Sleepy, arousable to voice, follows simple commands, mild to moderate distress.  Elderly woman looks malnourished.  HEENT: NC/AT.  BiPAP on place.    NECK:  trachea midline, no JVD.   CARDIAC: Distant, regular mild systolic murmur.  Bedside ultrasound showed relatively well-preserved LV function with mild hypokinesis apical septum.  Ejection fraction is close to 50  PULMONARY: Moderate rhonchi, more prominent in the left side.  GI: abdomen not distended and not tender on deep and superficial palpation, bowel sounds present. No hepatosplenomegaly or pulsatile masses.   : CVA not tender, bladder not palpable.  MUSCULOSKELETAL: Mild sarcopenia.  NEUROLOGICAL: Limited due to acute respiratory failure, spontaneously moves all extremities.   INTEGUMENT: no rash, no ulcerations, warm, dry.   LYMPHATIC/HEMATOLOGIC: no LAD in the neck, both axillae, and groins. No petechiae, no ecchymoses.       Goals of care were discussed with the patient and family which included but not limited to anticipated treatment course during the current hospitalization, recovery from current event, discharge planning and transitions of care responsibilities and expected outcomes. Code status was addressed on admission as well. End of life care discussion not done.    Data   Data reviewed today:  I personally reviewed blood work and chest XR.  X-ray impression see above    Recent Labs   Lab 11/14/19  0231   WBC 13.1*   HGB 13.9   MCV 89         POTASSIUM 3.4   CHLORIDE 114*   CO2 21   BUN 33*   CR 0.61   ANIONGAP 8   KEILY 9.5   *   ALBUMIN 3.2*   PROTTOTAL 7.0   BILITOTAL 0.6   ALKPHOS 66   ALT 26   AST 32   TROPI <0.015       No results found for this or any previous visit (from the past 24 hour(s)).

## 2019-11-14 NOTE — PLAN OF CARE
[facility] Inpatient Admission Note:    Patient admitted to 3124/3124-1 at approximately 0600 via cart accompanied by nurse from emergency room . Report received from HAMLET Trujillo in SBAR format at 0600 via face to face in room. Patient transferred to bed via slide board.. Patient is disoriented and confused, mumbling, appears in no pain; Faces scale of 0.  Patient oriented to room, unit, hourly rounding, and plan of care. Explained admission packet and patient handbook with patient bill of rights brochure. Will continue to monitor and document as needed.     Inpatient Nursing criteria listed below was met:    Health care directives status obtained and documented: No    Care Everywhere authorization obtained No, pt unable to answer questions    MRSA swab completed for patient 65 years and older: No, not yet, pt required BiPap and was not stable enough to remove mask to obtain swab at this time.  It was passed along to HAMLET Boyle (The oncoming nurse at 0700)    Patient identifies a surrogate decision maker: No, pt unresponsive     If initial lactic acid >2.0, repeat lactic acid drawn within one hour of arrival to unit: Yes.     Vaccination assessment and education completed: No pt unresponsive      Vaccination(s) ordered: not given today because pt unresponsive and unsure if she has had them or no    Clergy visit ordered if patient requests: No, pt unable to respond    Skin issues/needs documented: Yes    Isolation Patient: no Education given, correct sign in place and documentation row added to PCS:  No, Not appropriate at this time.    Fall Prevention Yes: Care plan updated, education given and documented, sticker and magnet in place: Yes    Care Plan initiated: Yes    Education Documented (including assessment): Yes    Patient has discharge needs : Yes If yes, please explain:Will need to go back to nursing home if discharged.

## 2019-11-14 NOTE — PLAN OF CARE
DATE:  11/14/2019   TIME OF RECEIPT FROM LAB:  1211   LAB TEST:  Lactic Acid   LAB VALUE:  3.2  RESULTS GIVEN WITH READ-BACK TO (PROVIDER):  Betty Deleon, NP  TIME LAB VALUE REPORTED TO PROVIDER:   1218

## 2019-11-14 NOTE — PROVIDER NOTIFICATION
DATE:  11/14/2019   TIME OF RECEIPT FROM LAB:  0758  LAB TEST:  Lactic Acid  LAB VALUE:  2.6  RESULTS GIVEN WITH READ-BACK TO (PROVIDER):  Betty Deleon NP and HAMLET VARGAS notified  TIME LAB VALUE REPORTED TO PROVIDER:   0802

## 2019-11-15 NOTE — PROGRESS NOTES
[facility]    Hospitalist Progress Note    Date of Service (when I saw the patient): 11/15/2019    Assessment & Plan       Acute respiratory failure with hypoxia (H): Severe, Sats 70% on arrival to the ED. Akiko to underlying pneumonia. She was placed on Bipap there and since then has improved with less oxygen requirements.  -11/14: Will trial off the bipap, per discussion with family they do not want any prolonging measures if it appears she will not recover from this illness.  -11/15: She has been weaned totally off of oxygen, no acute respiratory distress evident. Her rapid improvement makes acute aspiration event more likely the cause of her acute failure.      Pneumonia of both lungs due to infectious organism: Bacterial likely due to positive procalcitonin and response to antibiotics. May be due to aspiration event. Will continue Rocephin and Doxycycline, nebs, oxygen.      Respiratory acidosis: Due to #1, bilateral pneumonia-aspiration possible. PH 7.18 on arrival, improved to 7.25 on recheck.         Severe sepsis: Presented with lactic acidosis, metabolic encephalopathy, mild leukocytosis, tachycardia, fever, procal 12.98. She was given 1.5 liters of fluid-pressures improved, She was given dose of vanco and zosyn. She was transitioned to Rocephin and doxycycline as she has macrolid allergy.   -11/14: Cannot rule out aspiration as initial cause though CXR does not have clear signs of acute aspiration event. Will hold off on steroids as she has no wheezing or history of COPD. Her condition is grave, she had prolonged period of hypoxia prior to arrival and severe acidosis on arrival. Spoke with her grand-daughter-she would like to continue reasonable treatments for pneumonia but does not want treatment that would prolong her life if it does not appear she will improve. Weaned off Bipap now-will not restart if she worsens, no pressors, no further sepsis work-up or intervention except daily labs, IVF,  antibiotics. She is aware the next 24-48 hours will show if she is responding to treatment or worsening.  -11/15: Improved dramatically in the last 24 hours. She is now off oxygen. More awake and alert. Still not eating much, placed Dowling catheter due to retention. WBC remains normal. Procalcitonin 12.98-18.95-due to acute abrupt nature of illness, expect it will begin trending down tomorrow. Lactic acid normalized last night.     Hypokalemia: Due to no oral intake and IVF, will replace with IV today as she is not eating reliably.      Bradycardia: Chronic, She has a history of syncope and pauses. In hospital 9/2019 due to syncope and fall that resulted in subdural hematoma. Evaluated by cardiology 10/19 and he recommended a pacemaker to prevent further incidences, however there was no family with her at the time and so I do not if they were planning on pursuing that or not.       Essential hypertension: Pressures soft on arrival, received IVF for sepsis, now more stable. Hold home medications at this time.       Dementia without behavioral disturbance (H): Baseline confusion. This morning she is much more awake and alert but still cold.     DVT Prophylaxis: Enoxaparin (Lovenox) SQ  Code Status: DNR/DNI    Disposition: Expected discharge in several days once improved.    Betty Deleon, CNP    Interval History   Denies chest pain, dyspnea, abdominal pain or nausea. Does not want anything to eat yet.     -Data reviewed today: I reviewed all new labs and imaging results over the last 24 hours.    Physical Exam   Temp: 98.5  F (36.9  C) Temp src: Tympanic BP: 125/57 Pulse: 74 Heart Rate: 68 Resp: 21 SpO2: (!) 90 % O2 Device: None (Room air) Oxygen Delivery: 2 LPM  Vitals:    11/14/19 0630 11/15/19 0459   Weight: 45 kg (99 lb 3.3 oz) 45.7 kg (100 lb 12 oz)     Vital Signs with Ranges  Temp:  [97.2  F (36.2  C)-98.6  F (37  C)] 98.5  F (36.9  C)  Pulse:  [60-81] 74  Heart Rate:  [61-86] 68  Resp:  [11-32] 21  BP:  ()/(40-87) 125/57  SpO2:  [84 %-97 %] 90 %  I/O last 3 completed shifts:  In: 4039 [I.V.:4039]  Out: 1075 [Urine:1075]    Peripheral IV 11/15/19 Left Lower forearm (Active)   Site Assessment WDL 11/15/2019  9:00 AM   Line Status Checked every 1-2 hour;Infusing 11/15/2019  9:00 AM   Phlebitis Scale 0-->no symptoms 11/15/2019  9:00 AM   Infiltration Scale 0 11/15/2019  9:00 AM   Number of days: 0       Urethral Catheter Latex 16 fr (Active)   Tube Description Positional 11/14/2019  4:00 PM   Catheter Care Done 11/15/2019  9:11 AM   Collection Container Standard 11/15/2019  9:11 AM   Securement Method Securing device (Describe) 11/15/2019  9:11 AM   Rationale for Continued Use Retention 11/15/2019  9:11 AM   Urine Output 150 mL 11/15/2019  5:00 AM   Number of days: 1       Pressure Injury 11/14/19 Posterior;Medial Coccyx Stage 1 (Active)   Wound Base Erythema, blanchable 11/15/2019  9:11 AM   Lakia-wound Assessment Blanchable erythema 11/15/2019  9:11 AM   Drainage Amount None 11/15/2019  9:11 AM   Dressing Open to air 11/15/2019  9:11 AM   Number of days: 1       Pressure Injury 11/14/19 Toe (Comment which one) (Active)   Wound Base Slough;White 11/15/2019  9:11 AM   Lakia-wound Assessment Blanchable erythema 11/15/2019  9:11 AM   Dressing Foam 11/15/2019  9:11 AM   Number of days: 1     Line/device assessment(s) completed for medical necessity    Constitutional: Arouses to voice, more responsive today   Respiratory: Clear mildly diminished, she is moving air surprisingly well, no wheezes or rhonchi, Right is more diminished than left.  Cardiovascular: HRR, no murmurs, rubs,thrills  GI: Soft, does not react to palpation of abdomen, bowel sounds positive  Skin/Integumen: No rashes, bruising or open areas.        Medications     dextrose 5% and 0.45% NaCl + KCl 20 mEq/L 75 mL/hr at 11/15/19 1011       aspirin  81 mg Oral Daily     brinzolamide-brimonidine  1 drop Right Eye TID     cefTRIAXone  1 g Intravenous Q24H      doxycycline  intermittent infusion  100 mg Intravenous Q12H     famotidine  20 mg Oral BID     latanoprost  1 drop Right Eye At Bedtime     QUEtiapine  25 mg Oral At Bedtime     senna-docusate  1 tablet Oral Daily     sertraline  25 mg Oral Daily     timolol maleate  1 drop Right Eye BID       Data   Recent Labs   Lab 11/15/19  0450 11/14/19  0752 11/14/19  0231   WBC 10.4 8.8 13.1*   HGB 11.8 13.2 13.9   MCV 87 90 89    175 263   *  --  143   POTASSIUM 3.0*  --  3.4   CHLORIDE 119*  --  114*   CO2 21  --  21   BUN 27  --  33*   CR 0.47*  --  0.61   ANIONGAP 7  --  8   KEILY 8.8  --  9.5   GLC 82  --  242*   ALBUMIN  --   --  3.2*   PROTTOTAL  --   --  7.0   BILITOTAL  --   --  0.6   ALKPHOS  --   --  66   ALT  --   --  26   AST  --   --  32   TROPI  --   --  <0.015       No results found for this or any previous visit (from the past 24 hour(s)).

## 2019-11-15 NOTE — PROVIDER NOTIFICATION
Critical Lab: pcal 18.95     Also notified of K+ 3.0 with no replacement orders.    Paged Dr. Evans at [phone]

## 2019-11-15 NOTE — PLAN OF CARE
Reason for hospital stay:  Resp. Failure    Most recent vitals: Blood Pressure 134/76   Pulse 81   Temperature 98.6  F (37  C) (Temporal)   Respiration (Abnormal) 29   Weight 45.7 kg (100 lb 12 oz)   Oxygen Saturation 92%   Body Mass Index 16.26 kg/m      Pain Management:  Pt appeared in no pain per the faces scale.  They also denied pain when asked.    Orientation:  Disoriented x 4, pupils oval and fixed, moves all extremities, restless and calls out. Pt was given Seroquel and melatonin last evening and that helped throughout the night.    Cardiac:  Pt is SR 70s, S1S2, BUE Pulses 2+, no edema    Respiratory:  LS coarse, on RA     GI:  BS active, abdomen concave/malnourished. Soft/nontender    :  Dowling catheter present, minimal urine output    Nutrition:  Pt refuses to take PO meds and spits out everything you give her for nutrition.    Skin Issues:  See flowsheets. Turned and repositioned q 2 to assist in prevention and healing.    IVF:  NS at 100 mL/hr    Mobility:  Turned and repo'd every 2 hours, total assist    Safety:  Call light within reach, bed alarm on    Comments:  Will replace potassium this morning.  Awaiting IV replacement from pharmacy.  Will continue to monitor.       11/15/2019  5:38 AM  Carmen Hamilton RN    Face to face report given with opportunity to observe patient.    Report given to HAMLET Hay RN   11/15/2019  7:00 AM

## 2019-11-15 NOTE — PHARMACY
[facility]    Pharmacy    Antimicrobial Stewardship Note     Current antimicrobial therapy:  Anti-infectives (From now, onward)    Start     Dose/Rate Route Frequency Ordered Stop    11/14/19 0745  doxycycline (VIBRAMYCIN) 100 mg in D5W 250 mL intermittent infusion      100 mg  125-250 mL/hr over 1-2 Hours Intravenous EVERY 12 HOURS 11/14/19 0603      11/14/19 0700  cefTRIAXone in d5w (ROCEPHIN) intermittent infusion 1 g      1 g  over 30 Minutes Intravenous EVERY 24 HOURS 11/14/19 0603          Indication: CAP    Days of Therapy: 2     Pertinent labs:  Creatinine   Creatinine   Date Value Ref Range Status   11/15/2019 0.47 (L) 0.52 - 1.04 mg/dL Final   11/14/2019 0.61 0.52 - 1.04 mg/dL Final   09/15/2019 0.58 0.52 - 1.04 mg/dL Final     WBC   WBC   Date Value Ref Range Status   11/15/2019 10.4 4.0 - 11.0 10e9/L Final   11/14/2019 8.8 4.0 - 11.0 10e9/L Final   11/14/2019 13.1 (H) 4.0 - 11.0 10e9/L Final     Procalcitonin   Procalcitonin   Date Value Ref Range Status   11/15/2019 18.95 (HH) ng/ml Final     Comment:     >/= 10.00 ng/ml   Very high likelihood of severe sepsis or septic shock.  Recommendation: Strongly recommend initiating or continuing antibiotics.   Evaluate culture results and clinical features to target antibacterial   therapy. Obtain blood cultures and other relevant cultures if not done.Repeat   PCT in 2 days to guide antibiotic de-escalation. Consider de-escalating   antibiotics when PCT concentration is <80% of peak or abs PCT <1.  Critical Value called to and read back by  WILLIAM TEIXEIRA AT 0529 ON 11/15/19 BY DIXIE.     11/14/2019 12.98 (HH) ng/ml Final     Comment:     >/= 10.00 ng/ml   Very high likelihood of severe sepsis or septic shock.  Recommendation: Strongly recommend initiating or continuing antibiotics.   Evaluate culture results and clinical features to target antibacterial   therapy. Obtain blood cultures and other relevant cultures if not done.Repeat   PCT in 2 days to  guide antibiotic de-escalation. Consider de-escalating   antibiotics when PCT concentration is <80% of peak or abs PCT <1.  Critical Value called to and read back by  WILLIAM TEIXEIRA 0828 11/14/19 SHO       CRP   CRP Inflammation   Date Value Ref Range Status   11/14/2019 32.6 (H) 0.0 - 8.0 mg/L Final     Culture Results:     Recommendations/Interventions:  1. No recommendations at this time. Will continue to monitor.     Karla Marino RPH  November 15, 2019

## 2019-11-15 NOTE — PROVIDER NOTIFICATION
"I paged Dr. Evans awhile ago because the patient was getting worked up hollering out \"Take me home!\" after her family left.  We tried IV haldol  Now the patient is calling out \"I can't sleep!\" I paged. Dr. Evans again and he suggested calling family and seeing if they want to come sit with her. If they do not, he will order 2 mg IV haldol.   "

## 2019-11-15 NOTE — PLAN OF CARE
PTA medlist compared with med record from [facility].     Changes: ASA strength decreased from 325mg to 81 mg. Senna changed to senna-docusate.     Patient is using all eyedrops on the PTA list at the nursing home. Updated MD in person.     Ansley Avitia on 11/15/2019 at 10:18 AM

## 2019-11-15 NOTE — PLAN OF CARE
Patient is disorientated x 4, inconsistently follows commands. Call light in reach, bed and chair alarms on. Pt scoring 1 on PAINAD. Repositioning pt every two hours. Pt ate minimal amounts of breakfast, and would not eat lunch. Dowling patent in place for retention, clear yellow urine output. IVF switched to D5 .45 NS + 20K. Pt received 4 bags of 10 mEQ potassium replacement and 2G magnesium sulfate per protocol, rechecks ordered for 1830 and 11/16 0500 respectively. Pt refused or spit out all oral medications. Up to chair for lunch with assist of 2 and gabriel. LS coarse, oxygen saturation >91 on room air.

## 2019-11-16 NOTE — PROVIDER NOTIFICATION
Updated MD, Loss of IV access, ICU RN tried twice, request to leave out IV. Per MD patient needs new IV placed.

## 2019-11-16 NOTE — PLAN OF CARE
Patient disoriented x4. Difficult to understand. Declined breakfast but did take medications crushed with bites of pudding, also took a couple of sips of water. Patient remains on 6 LPM O2 with Sats 91%. Lungs coarse. Patient unable to follow direction to deep breath or cough. Patient up in chair this morning, transfers via Dean lift. Turned and repositioned every 2 hours. Resting in bed at shift change. Magnesium replaced, re-check in AM per protocol. Patient continues on IV Doxycycline and IV Rocephin.  Patient's IV disconected and clotted off, new IV placed in left thumb. IV is saline locked. Dowling in place for retention, 550 ml urine output this shift. Family here visiting part of shift. Alarms on, call light within reach, room near nurse's station, no attempts to get up.     Face to face report given with opportunity to observe patient.    Report given to Mati De Los Santos RN   11/16/2019  3:34 PM

## 2019-11-16 NOTE — PLAN OF CARE
Reason for hospital stay:  Acute resp failure with hypoxia    Most recent vitals: /82 (BP Location: Right arm)   Pulse 117   Temp 98.6  F (37  C) (Tympanic)   Resp (!) 28   Wt 45.7 kg (100 lb 12 oz)   SpO2 97%   BMI 16.26 kg/m    Pain Management:  Pt appears comfortable. No signs and symptoms of pt being in any pain.   Orientation:  Will answer questions appropriately sometimes when asked otherwise talked pretty constant incoherently. Disoriented x 4.  Cardiac:  Apical pulse is regular. Pulse has been in the low 100's to 1teens.   Respiratory:  RR in the high 20's and was very wet with rhonchi and course audible breath sounds. Placed a scopolamine patch this shift. RT came and suctioned her and got copious amounts of tan thin secretions. Pt still sounded wet and course although sounded slightly better. All shift pt had audible course ness. HOB elevated for comfort.    GI: Bowel sounds are active. No BM this shift.   :  Dowling in place and positional but draining clear yellow urine. 40mg IV lasix given with a total output for the shift is 450cc.  Diet: Not able to take anything by mouth at this time. Oral cares done multiple times this shift.   Skin Issues:  Small quarter size red blanchable area on left buttocks.   IVF:  D5 1/2 with 20 mg of K at 75 that got d/c'd this shift.   ABX:  Rocephin and Doxycycline.   Mobility:  No able to get out of bed. Pt turned every 2 hours.   Safety:  Frequent checks on pt for maintain comfort. Unable to use call light due to mental stasis.     11/16/2019  5:05 AM  Cara Butler RN    Face to face report given with opportunity to observe patient.    Report given to Ирина Butler RN   11/16/2019  7:13 AM

## 2019-11-16 NOTE — PLAN OF CARE
1740: assessment and vitals as charted. Iv site wdl. Pt confused and talking. Encouraged pt to tcdb encouaged to try and cough up phlegm pt sounds congested. Pt states she doesn't need too. Refused dinner. Grand daughters here to see pt. Iv site wdl and infusing without difficulty.   2000: oxygen sats 80's, resp rate 28. Pt restless. I and o +5000ml. Dr. sun notified.  Pt placed on 3lpm and sats increased to upper 80's.  Then oxygen sats 95% on 5 lpm.    2034: Iv lasix given. Respiratory here to see pt. sats 95% on 5 lpm heart rate 105. Sepsis alert.  \  2040: lab here to draw blood  2140: abreu has 60cc urine. Lactic came 2.1.  Dr. sun paged and notified. Pt in bed resp 28, oxygen sats 95% on 5 lpm.  Pt being turned and repositioned q 2 hours and prn  2225: abreu drained 750cc urine since lasix given. Pt  Appears more comfortable but continues to ramble.  Still sounding congested encouraged to cough. Pt offered fluids/ food this shift pt just spits out or has shuts mouth. Pt refused her meds tonigt. Pt just rambles. Oral cares done as pt will allow. Pt has been turned and repositioned q 2 hours and prn this shift.  Face to face report given with opportunity to observe patient.    Report given to nydia moctezuma RN   11/15/2019  11:10 PM

## 2019-11-16 NOTE — PLAN OF CARE
Face to face report given with opportunity to observe patient.    Report given to Genie Resendiz RN   11/15/2019  6:19 PM

## 2019-11-16 NOTE — PROGRESS NOTES
KAREY WALTON.  Patient visit during  rounds. Patient was awake but unresponsive. A family member was present who noted that Karey lives at [facility] and her  is from the [facility], however, he is on vacation and unavailable. Noted to the family member that chaplains are on call if needed for assistance. No further spiritual care requests were made at this time.

## 2019-11-16 NOTE — PLAN OF CARE
DATE:  11/15/2019   TIME OF RECEIPT FROM LAB:  2142  LAB TEST:  Lactic   LAB VALUE:  2.1  RESULTS GIVEN WITH READ-BACK TO (PROVIDER): Dr. Evans  TIME LAB VALUE REPORTED TO PROVIDER:   2149

## 2019-11-16 NOTE — PHARMACY
[facility]    Pharmacy      Antimicrobial Stewardship Note     Current antimicrobial therapy:  Anti-infectives (From now, onward)    Start     Dose/Rate Route Frequency Ordered Stop    11/14/19 0745  doxycycline (VIBRAMYCIN) 100 mg in D5W 250 mL intermittent infusion      100 mg  125-250 mL/hr over 1-2 Hours Intravenous EVERY 12 HOURS 11/14/19 0603      11/14/19 0700  cefTRIAXone in d5w (ROCEPHIN) intermittent infusion 1 g      1 g  over 30 Minutes Intravenous EVERY 24 HOURS 11/14/19 0603            Indication: CAP    Days of Therapy: 3     Pertinent labs:  Creatinine   Creatinine   Date Value Ref Range Status   11/16/2019 0.60 0.52 - 1.04 mg/dL Final   11/15/2019 0.47 (L) 0.52 - 1.04 mg/dL Final   11/14/2019 0.61 0.52 - 1.04 mg/dL Final     WBC   WBC   Date Value Ref Range Status   11/16/2019 14.7 (H) 4.0 - 11.0 10e9/L Final   11/15/2019 10.4 4.0 - 11.0 10e9/L Final   11/14/2019 8.8 4.0 - 11.0 10e9/L Final     Procalcitonin   Procalcitonin   Date Value Ref Range Status   11/15/2019 18.95 (HH) ng/ml Final     Comment:     >/= 10.00 ng/ml   Very high likelihood of severe sepsis or septic shock.  Recommendation: Strongly recommend initiating or continuing antibiotics.   Evaluate culture results and clinical features to target antibacterial   therapy. Obtain blood cultures and other relevant cultures if not done.Repeat   PCT in 2 days to guide antibiotic de-escalation. Consider de-escalating   antibiotics when PCT concentration is <80% of peak or abs PCT <1.  Critical Value called to and read back by  WILLIAM TEIXEIRA AT 0529 ON 11/15/19 BY DIXIE.     11/14/2019 12.98 (HH) ng/ml Final     Comment:     >/= 10.00 ng/ml   Very high likelihood of severe sepsis or septic shock.  Recommendation: Strongly recommend initiating or continuing antibiotics.   Evaluate culture results and clinical features to target antibacterial   therapy. Obtain blood cultures and other relevant cultures if not done.Repeat   PCT in 2 days  to guide antibiotic de-escalation. Consider de-escalating   antibiotics when PCT concentration is <80% of peak or abs PCT <1.  Critical Value called to and read back by  WILLIAM TEIXEIRA 0882 11/14/19 SHO       CRP   CRP Inflammation   Date Value Ref Range Status   11/14/2019 32.6 (H) 0.0 - 8.0 mg/L Final       Culture Results:   11/14/2019 1218 11/15/2019 0634 Active MRSA Surveillance Culture [[id number]]   Nares from Nasal Swab    Final result Specimen Description Nares   Culture Micro No MRSA isolated           11/14/2019 0242 11/16/2019 0639 Blood culture [[id number]]   Blood    Preliminary result Specimen Description Blood   Special Requests Left Hand P   Culture Micro No growth after 2 days P           11/14/2019 0231 11/16/2019 0639 Blood culture [[id number]]   Blood    Preliminary result Specimen Description Blood   Special Requests Right Arm   IV START   P   Culture Micro No growth after 2 days P             Recommendations/Interventions:  1. None at this time.    Rian Storm RPH  November 16, 2019

## 2019-11-17 NOTE — PROGRESS NOTES
[facility]    Medicine Progress Note - Hospitalist Service       Date of Admission:  11/14/2019  Assessment & Plan   Acute respiratory failure with hypoxia (H): Severe, Sats 70% on arrival to the ED. Akiko to underlying pneumonia. She was placed on Bipap there and since then has improved with less oxygen requirements.  -11/14: Will trial off the bipap, per discussion with family they do not want any prolonging measures if it appears she will not recover from this illness.  -11/15: She has been weaned totally off of oxygen, no acute respiratory distress evident. Her rapid improvement makes acute aspiration event more likely the cause of her acute failure.      Pneumonia of both lungs due to infectious organism: Bacterial likely due to positive procalcitonin and response to antibiotics. May be due to aspiration event. Will continue Rocephin and Doxycycline, nebs, oxygen.      Respiratory acidosis: Due to #1, bilateral pneumonia-aspiration possible. PH 7.18 on arrival, improved to 7.25 on recheck.         Severe sepsis: Presented with lactic acidosis, metabolic encephalopathy, mild leukocytosis, tachycardia, fever, procal 12.98. She was given 1.5 liters of fluid-pressures improved, She was given dose of vanco and zosyn. She was transitioned to Rocephin and doxycycline as she has macrolid allergy.   -11/14: Cannot rule out aspiration as initial cause though CXR does not have clear signs of acute aspiration event. Will hold off on steroids as she has no wheezing or history of COPD. Her condition is grave, she had prolonged period of hypoxia prior to arrival and severe acidosis on arrival. Spoke with her grand-daughter-she would like to continue reasonable treatments for pneumonia but does not want treatment that would prolong her life if it does not appear she will improve. Weaned off Bipap now-will not restart if she worsens, no pressors, no further sepsis work-up or intervention except daily labs, IVF,  antibiotics. She is aware the next 24-48 hours will show if she is responding to treatment or worsening.  -11/15: Improved dramatically in the last 24 hours. She is now off oxygen. More awake and alert. Still not eating much, placed Dowling catheter due to retention. WBC remains normal. Procalcitonin 12.98-18.95-due to acute abrupt nature of illness, expect it will begin trending down tomorrow. Lactic acid normalized last night.     Hypokalemia: Due to no oral intake and IVF, will replace with IV today as she is not eating reliably.      Bradycardia: Chronic, She has a history of syncope and pauses. In hospital 9/2019 due to syncope and fall that resulted in subdural hematoma. Evaluated by cardiology 10/19 and he recommended a pacemaker to prevent further incidences, however there was no family with her at the time and so I do not if they were planning on pursuing that or not.       Essential hypertension: Pressures soft on arrival, received IVF for sepsis, now more stable. Hold home medications at this time.       Dementia without behavioral disturbance (H): Baseline confusion. This morning she is much more awake and alert but still cold.     DVT Prophylaxis: Enoxaparin (Lovenox) SQ  Code Status: DNR/DNI    Disposition: Expected discharge in several days once improved.    Betty Deleon CNP       Diet: NPO for Medical/Clinical Reasons Except for: No Exceptions    Dowling Catheter: in place, indication: Retention  Code Status: DNR/DNI      Disposition Plan   Expected discharge: 4 - 7 days, recommended to transitional care unit   Entered: Cornel Munroe MD 11/17/2019, 3:59 AM       Cornel Munroe MD  Hospitalist Service  [facility]    ______________________________________________________________________    Interval History   No overnight events. No chest pain or SOB. Stable. Failed bedside swallow evaluation     Data reviewed today: I reviewed all medications, new labs and imaging results over the  last 24 hours. I personally reviewed no images or EKG's today.    Physical Exam   Vital Signs: Temp: 98.2  F (36.8  C) Temp src: Tympanic BP: 126/66 Pulse: 81 Heart Rate: 82 Resp: 22 SpO2: 95 % O2 Device: Nasal cannula Oxygen Delivery: 6 LPM  Weight: 100 lbs 12 oz  Constitutional: Sleeps unless aroused, open eyes minimally, pal and ill appearing  Respiratory:     Clear but course and diminished throughout, she is moving air surprisingly well, no wheezes or rhonchi, Right is more diminished than left.  Cardiovascular: HRR, no murmurs, rubs,thrills  GI: Soft, does not react to palpation of abdomen  Skin/Integumen: No rashes, bruising or open areas.          Data   Recent Labs   Lab 11/16/19  0550 11/15/19  1855 11/15/19  0450 11/14/19  0752 11/14/19  0231   WBC 14.7*  --  10.4 8.8 13.1*   HGB 15.1  --  11.8 13.2 13.9   MCV 90  --  87 90 89     --  157 175 263     --  147*  --  143   POTASSIUM 3.5 4.2 3.0*  --  3.4   CHLORIDE 114*  --  119*  --  114*   CO2 20  --  21  --  21   BUN 28  --  27  --  33*   CR 0.60  --  0.47*  --  0.61   ANIONGAP 10  --  7  --  8   KEILY 9.4  --  8.8  --  9.5   *  --  82  --  242*   ALBUMIN  --   --   --   --  3.2*   PROTTOTAL  --   --   --   --  7.0   BILITOTAL  --   --   --   --  0.6   ALKPHOS  --   --   --   --  66   ALT  --   --   --   --  26   AST  --   --   --   --  32   TROPI  --   --   --   --  <0.015

## 2019-11-17 NOTE — PLAN OF CARE
Awake, alert. Disoriented x 4. Responds to name. Speech is incoherent but does answer appropriately yes and no.Unable to follow directions; spontaneous movements but requires repositioning. Weak. VSS. AF. O2 93% 6 L NC. Lungs coarse BL and audibly. Blanchable redness to coccyx and Left hip with barrier cream applied. Left great toe pea sized ulcer.Small callous to Rt heel. Dowling catheter for retention. Bedside swallow eval performed as patient was unable to swallow small amount of pudding; failed the prescreen process hence made NPO and SLP eval ordered per policy. MD notified of same with IV locked, decreased UOP and negative I/Os. No new orders at that time. Dean to chair, one small loose stool.  Face to face report given with opportunity to observe patient.    Report given to Veronica Jarvis RN   11/16/2019  11:31 PM

## 2019-11-17 NOTE — PHARMACY
[facility]    Pharmacy      Antimicrobial Stewardship Note     Current antimicrobial therapy:  Anti-infectives (From now, onward)    Start     Dose/Rate Route Frequency Ordered Stop    11/14/19 0745  doxycycline (VIBRAMYCIN) 100 mg in D5W 250 mL intermittent infusion      100 mg  125-250 mL/hr over 1-2 Hours Intravenous EVERY 12 HOURS 11/14/19 0603      11/14/19 0700  cefTRIAXone in d5w (ROCEPHIN) intermittent infusion 1 g      1 g  over 30 Minutes Intravenous EVERY 24 HOURS 11/14/19 0603            Indication: CAP    Days of Therapy: 4     Pertinent labs:  Creatinine   Creatinine   Date Value Ref Range Status   11/16/2019 0.60 0.52 - 1.04 mg/dL Final   11/15/2019 0.47 (L) 0.52 - 1.04 mg/dL Final   11/14/2019 0.61 0.52 - 1.04 mg/dL Final     WBC   WBC   Date Value Ref Range Status   11/16/2019 14.7 (H) 4.0 - 11.0 10e9/L Final   11/15/2019 10.4 4.0 - 11.0 10e9/L Final   11/14/2019 8.8 4.0 - 11.0 10e9/L Final     Procalcitonin   Procalcitonin   Date Value Ref Range Status   11/15/2019 18.95 (HH) ng/ml Final     Comment:     >/= 10.00 ng/ml   Very high likelihood of severe sepsis or septic shock.  Recommendation: Strongly recommend initiating or continuing antibiotics.   Evaluate culture results and clinical features to target antibacterial   therapy. Obtain blood cultures and other relevant cultures if not done.Repeat   PCT in 2 days to guide antibiotic de-escalation. Consider de-escalating   antibiotics when PCT concentration is <80% of peak or abs PCT <1.  Critical Value called to and read back by  WILLIAM TEIXEIRA AT 0529 ON 11/15/19 BY DIXIE.     11/14/2019 12.98 (HH) ng/ml Final     Comment:     >/= 10.00 ng/ml   Very high likelihood of severe sepsis or septic shock.  Recommendation: Strongly recommend initiating or continuing antibiotics.   Evaluate culture results and clinical features to target antibacterial   therapy. Obtain blood cultures and other relevant cultures if not done.Repeat   PCT in 2 days  to guide antibiotic de-escalation. Consider de-escalating   antibiotics when PCT concentration is <80% of peak or abs PCT <1.  Critical Value called to and read back by  WILLIAM TEIXEIRA 0828 11/14/19 SHO       CRP   CRP Inflammation   Date Value Ref Range Status   11/14/2019 32.6 (H) 0.0 - 8.0 mg/L Final       Culture Results:      Recommendations/Interventions:  1. None at this time.    Rian Storm [facility]  November 17, 2019

## 2019-11-17 NOTE — PLAN OF CARE
Reason for hospital stay:  Respiratory  Failure    Most recent vitals: /61 (BP Location: Right arm)   Pulse 69   Temp 96.3  F (35.7  C) (Tympanic)   Resp (!) 28   Wt 45.7 kg (100 lb 12 oz)   SpO2 100%   BMI 16.26 kg/m        Pain Management:  Acute respiratory failure     Orientation:  Disoriented x4    Cardiac:  Apical pulse regular-no edema noted    Respiratory:  Crackles throughout-- posterior. Shallow breathing, tachypneic, use of abdominal muscles. On 6 LPM. Able to get 02 sat this morning (98%), but unable to this afternoon.     GI:  Bowel sounds hypoactive all four quadrants, NPO    :  Adequate output throughout the day-- dark chantal urine    Nutrition:  NPO    Skin Issues:  Bruising noted on R and L arms, bilateral mottling on ankles/feet, body is cold to touch    IVF:  Saline lock    ABX:  Doxycycline and Rocephin    Mobility:  Full assist with gabriel    Safety:  Call light within reach, frequent checks     Comments:  Up in chair for a few hours--repositioned Q 2 hr. Able to understand her better today    Face to face report given with opportunity to observe patient.    Report given to Mati Meyer   11/17/2019  3:11 PM

## 2019-11-17 NOTE — PROGRESS NOTES
[facility]    Medicine Progress Note - Hospitalist Service       Date of Admission:  11/14/2019  Assessment & Plan   Acute respiratory failure with hypoxia (H): Severe, Sats 70% on arrival to the ED. Akiko to underlying pneumonia. She was placed on Bipap there and since then has improved with less oxygen requirements.  -11/14: Will trial off the bipap, per discussion with family they do not want any prolonging measures if it appears she will not recover from this illness.  -persistently on 6L NC now      Pneumonia of both lungs due to infectious organism: Bacterial likely due to positive procalcitonin and response to antibiotics. May be due to aspiration event. Will continue Rocephin and Doxycycline, nebs, oxygen.      Respiratory acidosis: Due to #1, bilateral pneumonia-aspiration possible. PH 7.18 on arrival, improved to 7.25 on recheck.         Severe sepsis: Presented with lactic acidosis, metabolic encephalopathy, mild leukocytosis, tachycardia, fever, procal 12.98. She was given 1.5 liters of fluid-pressures improved, She was given dose of vanco and zosyn. She was transitioned to Rocephin and doxycycline as she has macrolid allergy.   -11/14: Cannot rule out aspiration as initial cause though CXR does not have clear signs of acute aspiration event. Will hold off on steroids as she has no wheezing or history of COPD. Her condition is grave, she had prolonged period of hypoxia prior to arrival and severe acidosis on arrival. Grand-daughter would like to continue reasonable treatments for pneumonia but does not want treatment that would prolong her life if it does not appear she will improve. Weaned off Bipap now-will not restart if she worsens, no pressors, no further sepsis work-up or intervention except daily labs, IVF, antibiotics. She is aware the next 24-48 hours will show if she is responding to treatment or worsening.  - currently on 6L of O2    Hypokalemia: Due to no oral intake and IVF, will  replace.      Bradycardia: Chronic, She has a history of syncope and pauses. In hospital 9/2019 due to syncope and fall that resulted in subdural hematoma. Evaluated by cardiology 10/19 and he recommended a pacemaker to prevent further incidences.      Essential hypertension: Pressures soft on arrival, received IVF for sepsis, now more stable. Hold home HTNsive medications at this time.       Dementia without behavioral disturbance (H): Baseline confusion. This morning she is much more awake and alert but still cold.     DVT Prophylaxis: Enoxaparin (Lovenox) SQ  Code Status: DNR/DNI    Disposition: Expected discharge in several days once improved.    Josse Farooq MD    ___________________________________________________________________    Interval History   No overnight events. No chest pain or SOB. Stable. Failed bedside swallow evaluation     Data reviewed today: I reviewed all medications, new labs and imaging results over the last 24 hours. I personally reviewed no images or EKG's today.    Physical Exam   Vital Signs: Temp: 97.6  F (36.4  C) Temp src: Tympanic BP: 140/75 Pulse: 84 Heart Rate: 82 Resp: 22 SpO2: 94 % O2 Device: Nasal cannula Oxygen Delivery: 6 LPM  Weight: 100 lbs 12 oz  Constitutional: Sleeps unless aroused, open eyes minimally, pal and ill appearing, unintelligible speech  Respiratory:     Clear but course and diminished throughout, she is moving air surprisingly well, no wheezes or rhonchi, Right is more diminished than left.  Cardiovascular: HRR, no murmurs, rubs,thrills  GI: Soft, does not react to palpation of abdomen  Skin/Integumen: No rashes, bruising or open areas.          Data   Recent Labs   Lab 11/16/19  0550 11/15/19  1855 11/15/19  0450 11/14/19  0752 11/14/19  0231   WBC 14.7*  --  10.4 8.8 13.1*   HGB 15.1  --  11.8 13.2 13.9   MCV 90  --  87 90 89     --  157 175 263     --  147*  --  143   POTASSIUM 3.5 4.2 3.0*  --  3.4   CHLORIDE 114*  --  119*  --  114*   CO2  20  --  21  --  21   BUN 28  --  27  --  33*   CR 0.60  --  0.47*  --  0.61   ANIONGAP 10  --  7  --  8   KEILY 9.4  --  8.8  --  9.5   *  --  82  --  242*   ALBUMIN  --   --   --   --  3.2*   PROTTOTAL  --   --   --   --  7.0   BILITOTAL  --   --   --   --  0.6   ALKPHOS  --   --   --   --  66   ALT  --   --   --   --  26   AST  --   --   --   --  32   TROPI  --   --   --   --  <0.015

## 2019-11-17 NOTE — PROVIDER NOTIFICATION
Dr Montaño notified patient has failed bedside swallow evaluation. Also updated on current UOP and I/Os last 20 hours with IVF locked. Orders received to make NPO and keep IVF locked.

## 2019-11-17 NOTE — PLAN OF CARE
Reason for hospital stay:  resp failure change in mentation     Most recent vitals: /66 (BP Location: Right arm)   Pulse 81   Temp 98.2  F (36.8  C) (Tympanic)   Resp 22   Wt 45.7 kg (100 lb 12 oz)   SpO2 95%   BMI 16.26 kg/m      Pain Management:  denies pain     Orientation:  disoriented x4 - able to answer yes/no questions intermittently speech is hard to understand     Cardiac:  s1s2 regular rhythm     Respiratory:  LS course throughout - 6 LPM NC    GI:  concave soft nontender ABD - BS hypoactive     :  abreu cath in place and patient - output is adequate - color is light yellow and clear     Nutrition:  NPO     Skin Issues:  sore/callus to right great toe - blanchable reddness to L) Hip     IVF:  saline locked     ABX:  Rocephin and doxycycline IV      Mobility:  gabriel lift     Safety:  alarms on bed - room near unit station     Comments: extremities cold to the touch no mottling only pale at this time     Face to face report given with opportunity to observe patient.    Report given to Dianna MCNULTY and Nemo Longoria   11/17/2019  7:09 AM

## 2019-11-18 NOTE — PROGRESS NOTES
11/18/19 1200   General Information   Onset Date 11/16/19   Start of Care Date 11/18/19   Referring Physician Cindy Munroe MD   Patient Profile Review/OT: Additional Occupational Profile Info See Profile for full history and prior level of function   Patient/Family Goals Statement Patient unable to verbalize goal   Swallowing Evaluation Bedside swallow evaluation   Behaviorial Observations Lethargic;Confused   Mode of current nutrition NPO   Respiratory Status O2 Supply   Type of O2 supply Nasal cannula   Comments Patient was on a regular oral diet prior to hospitalization.   Clinical Swallow Evaluation   Oral Musculature generally intact   Structural Abnormalities none present   Dentition present and adequate   Mucosal Quality good   Laryngeal Function Dry swallow palpated   Oral Musculature Comments Patient unable to participate with oral mechanism examination due to cognitive impairments.  Patient was alert but lethargic and voiced to SLP several times that she wanted to go home.   Additional Documentation Yes   Additional evaluation(s) completed today Yes   Rationale for completing additional evaluation Concern for aspiration   Clinical Swallow Eval: Thin Liquid Texture Trial   Mode of Presentation, Thin Liquids spoon;fed by clinician   Oral Phase of Swallow Poor AP movement;Residue in oral cavity;Premature pharyngeal entry   Oral Residue mid posterior tongue   Pharyngeal Phase of Swallow impaired;other (see comments)  (no swallow reflex present)   Diagnostic Statement Impaired   Clinical Swallow Eval: Honey Thick Liquid Texture Trial   Mode of Presentation, Honey spoon;fed by clinician   Oral Phase, Honey Poor AP movement;Residue in oral cavity;Premature pharyngeal entry   Oral Residue, Honey mid posterior tongue   Pharyngeal Phase, Honey impaired  (no swallow reflex present)   Diagnostic Statement Impaired   Clinical Swallow Eval: Pudding Thick Liquid Texture Trial   Mode of Presentation, Pudding  spoon;fed by clinician   Oral Phase, Pudding Poor AP movement;Residue in oral cavity;Premature pharyngeal entry   Oral Residue, Pudding mid posterior tongue   Pharyngeal Phase, Pudding impaired  (no swallow reflex present)   Diagnostic Statement Impaired   Clinical Swallow Eval: Puree Solid Texture Trial   Mode of Presentation, Puree spoon;fed by clinician   Oral Phase, Puree Poor AP movement;Residue in oral cavity;Premature pharyngeal entry   Oral Residue, Puree mid posterior tongue   Pharyngeal Phase, Puree impaired  (no swallow reflex present)   Diagnostic Statement Impaired   VFSS Evaluation   VFSS Additional Documentation No  (Patient unable to particiapte)   FEES Evaluation   Additional Documentation No   Swallow Compensations   Results Suspect silent aspiration   General Therapy Interventions   Planned Therapy Interventions   (Evaluation only)   Swallow Eval: Clinical Impressions   Skilled Criteria for Therapy Intervention No significant expected  improvement in functional status   Functional Assessment Scale (FAS) 1   Dysphagia Outcome Severity Scale (MAXIME) Level 1 - MAXIME   Treatment Diagnosis Oropharyngeal Dysphagia   Diet texture recommendations NPO   Anticipated Discharge Disposition extended care facility   Risks and Benefits of Treatment have been explained. Yes   Patient, family and/or staff in agreement with Plan of Care Yes   Clinical Impression Comments Patient unable to masticate, form a cohesive bolus, transfer bolus, and unable to elicit a swallow across materials following maximal prompts and cues.  It is recommended that patient maintain NPO status as she is unable to swallow and close her airway.     Therapy Certification   Certification date from 11/18/19   Certification date to 11/18/19   Medical Diagnosis Oropharyngeal Dysphagia   Certification I certify the need for these services furnished under this plan of treatment and while under my care.  (Physician co-signature of this document  indicates review and certification of the therapy plan).

## 2019-11-18 NOTE — PROGRESS NOTES
[facility]    Medicine Progress Note - Hospitalist Service       Date of Admission:  11/14/2019  Assessment & Plan     Acute respiratory failure with hypoxia (H): Severe, Sats 70% on arrival to the ED due to underlying pneumonia. She was placed on Bipap there and since then has improved with less oxygen requirements.  -11/14:  Trial off the bipap, per discussion with family they do not want any prolonging measures if it appears she will not recover from this illness.  - persistently on 6L NC now      Pneumonia of both lungs due to infectious organism: Bacterial likely due to positive procalcitonin and response to antibiotics. May be due to aspiration event. Patient has lost IV access, so will give rocephin IM.  Will test swallow, hopefully can resume doxycycline, but not urgent.  Continue nebs, oxygen.      Respiratory acidosis: Due to #1, bilateral pneumonia-aspiration possible. pH 7.18 on arrival, improved to 7.25 on recheck.         Severe sepsis: Presented with lactic acidosis, metabolic encephalopathy, mild leukocytosis, tachycardia, fever, procal 12.98. She was given 1.5 liters of fluid-pressures improved, She was given dose of vanco and zosyn. She was transitioned to Rocephin and doxycycline as she has macrolid allergy.   Cannot rule out aspiration as initial cause though CXR does not have clear signs of acute aspiration event. Will hold off on steroids as she has no wheezing or history of COPD. Her condition is grave, she had prolonged period of hypoxia prior to arrival and severe acidosis on arrival. Grand-daughter would like to continue reasonable treatments for pneumonia but does not want treatment that would prolong her life if it does not appear she will improve. Weaned off Bipap now-will not restart if she worsens, no pressors, no further sepsis work-up or intervention except daily labs, IVF, antibiotics. She is aware the next 24-48 hours will show if she is responding to treatment or  worsening.  - currently on 6L of O2, mental status waxes and wanes    Hypokalemia: Due to no oral intake and IVF, will replace.      Bradycardia: Chronic, She has a history of syncope and pauses. In hospital 9/2019 due to syncope and fall that resulted in subdural hematoma. Evaluated by cardiology 10/19 and he recommended a pacemaker to prevent further incidences.      Essential hypertension: Pressures soft on arrival, received IVF for sepsis, now more stable. Hold home HTNsive medications at this time.       Dementia without behavioral disturbance (H): Baseline confusion. This morning she is much more awake and alert but still cold.     DVT Prophylaxis: Enoxaparin (Lovenox) subcutaneous    Code Status: DNR/DNI    Disposition: Expected discharge in several days once improved.  Palliative care/hospice is an option if patient declines per family.    Josse Farooq MD    ___________________________________________________________________    Interval History   No overnight events.  Patient reports to be waxing and waning as far as her alertness and responsiveness.   Currently she is awake but somewhat somnolent, able to interact but speech is mostly unintelligible.     Data reviewed today: I reviewed all medications, new labs and imaging results over the last 24 hours. I personally reviewed no images or EKG's today.    Physical Exam   Vital Signs: Temp: 98.1  F (36.7  C) Temp src: Tympanic BP: 157/74 Pulse: 90 Heart Rate: 87 Resp: 18 SpO2: 95 % O2 Device: Nasal cannula Oxygen Delivery: 4 LPM  Weight: 100 lbs 12 oz  Constitutional: Sleeps unless aroused, open eyes minimally, pal and ill appearing, unintelligible speech  Respiratory:     Clear but course and diminished throughout, she is moving air surprisingly well, no wheezes or rhonchi, Right is more diminished than left.  Cardiovascular: HRR, no murmurs, rubs,thrills  GI: Soft, does not react to palpation of abdomen  Skin/Integumen: No rashes, bruising or open areas.           Data   Recent Labs   Lab 11/16/19  0550 11/15/19  1855 11/15/19  0450 11/14/19  0752 11/14/19  0231   WBC 14.7*  --  10.4 8.8 13.1*   HGB 15.1  --  11.8 13.2 13.9   MCV 90  --  87 90 89     --  157 175 263     --  147*  --  143   POTASSIUM 3.5 4.2 3.0*  --  3.4   CHLORIDE 114*  --  119*  --  114*   CO2 20  --  21  --  21   BUN 28  --  27  --  33*   CR 0.60  --  0.47*  --  0.61   ANIONGAP 10  --  7  --  8   KEILY 9.4  --  8.8  --  9.5   *  --  82  --  242*   ALBUMIN  --   --   --   --  3.2*   PROTTOTAL  --   --   --   --  7.0   BILITOTAL  --   --   --   --  0.6   ALKPHOS  --   --   --   --  66   ALT  --   --   --   --  26   AST  --   --   --   --  32   TROPI  --   --   --   --  <0.015

## 2019-11-18 NOTE — PLAN OF CARE
Awake, alert, oriented to self/place start of shift. VSS/AF Has refused O2 initially and briefly maintained low 90s RA before dropping to 70s ;O2 6L NC applied to maintain sats mid 90s. Tires t/o shift and becomes less lucid and disoriented. Decreased UOP; see provider notification. IV infiltrated; MD notified and does not wish to have IV restarted at this time. Skin assessment as charted. Turns q 2 hours. Dean to chair.   Face to face report given with opportunity to observe patient.    Report given to Veronica Jarvis RN   11/18/2019  12:32 AM

## 2019-11-18 NOTE — PLAN OF CARE
Reason for hospital stay:  resp failure     Most recent vitals: /72 (BP Location: Left arm)   Pulse 82   Temp 97.1  F (36.2  C) (Tympanic)   Resp 18   Wt 45.7 kg (100 lb 12 oz)   SpO2 100%   BMI 16.26 kg/m      Pain Management:  denies     Orientation:  changes throughout shift - at times disoriented x4 not speaking clear and then will be oriented to self and place and speaking coherently     Cardiac:  s1s2    Respiratory:  coarse bases     GI:  ABD soft concave nontender - BS hypoactive     :  abreu in place and patent void is clear chantal     Nutrition:  NPO     Skin Issues:  callus to right great toe dusky in color and dry - blanchable redness to coccyx and L) hip      IVF:  no IV access     ABX:  none     Mobility:  gabriel lift     Safety:  alarms on bed - near unit station - bed lowest position     Face to face report given with opportunity to observe patient.    Report given to Ivone Longoria   11/18/2019  6:59 AM

## 2019-11-18 NOTE — PLAN OF CARE
Vital signs:  Temp: 98.1  F (36.7  C) Temp src: Tympanic BP: 157/74 Pulse: 90 Heart Rate: 95 Resp: 18 SpO2: 94 % O2 Device: Nasal cannula Oxygen Delivery: 3 LPM       Patient alert and opens eyes spontaneously; garbled slurred speech; difficult to make out what the patient is saying. Denies pain and says she is comfortable. Weaned O2 NC from 6L to 3; maintaining Sats low to mid 90's. Assessment as charted. Turned Q2 hours; abreu patent with 175 dark urine out. Failed swallow eval with ST as patient will not initiate swallow and spits food out - patient remains NPO. Bed low and locked, ID band on, call light in reach, room directly outside of nursing station, alarms on, frequent rounding.     Face to face report given with opportunity to observe patient.    Report given to HAMLET Thorne RN   11/18/2019  3:19 PM

## 2019-11-18 NOTE — PHARMACY
[facility]    Pharmacy      Antimicrobial Stewardship Note     Current antimicrobial therapy:  Anti-infectives (From now, onward)    Start     Dose/Rate Route Frequency Ordered Stop    11/18/19 0800  cefTRIAXone (ROCEPHIN) 1 g in lidocaine (PF) (XYLOCAINE) 1 % injection      1 g Intramuscular EVERY 24 HOURS 11/18/19 0630            Indication: CAP    Days of Therapy: 5     Pertinent labs:  Creatinine   Creatinine   Date Value Ref Range Status   11/16/2019 0.60 0.52 - 1.04 mg/dL Final   11/15/2019 0.47 (L) 0.52 - 1.04 mg/dL Final   11/14/2019 0.61 0.52 - 1.04 mg/dL Final     WBC   WBC   Date Value Ref Range Status   11/16/2019 14.7 (H) 4.0 - 11.0 10e9/L Final   11/15/2019 10.4 4.0 - 11.0 10e9/L Final   11/14/2019 8.8 4.0 - 11.0 10e9/L Final     Procalcitonin   Procalcitonin   Date Value Ref Range Status   11/15/2019 18.95 (HH) ng/ml Final     Comment:     >/= 10.00 ng/ml   Very high likelihood of severe sepsis or septic shock.  Recommendation: Strongly recommend initiating or continuing antibiotics.   Evaluate culture results and clinical features to target antibacterial   therapy. Obtain blood cultures and other relevant cultures if not done.Repeat   PCT in 2 days to guide antibiotic de-escalation. Consider de-escalating   antibiotics when PCT concentration is <80% of peak or abs PCT <1.  Critical Value called to and read back by  WILLIAM TEIXEIRA AT 0529 ON 11/15/19 BY DIXIE.     11/14/2019 12.98 (HH) ng/ml Final     Comment:     >/= 10.00 ng/ml   Very high likelihood of severe sepsis or septic shock.  Recommendation: Strongly recommend initiating or continuing antibiotics.   Evaluate culture results and clinical features to target antibacterial   therapy. Obtain blood cultures and other relevant cultures if not done.Repeat   PCT in 2 days to guide antibiotic de-escalation. Consider de-escalating   antibiotics when PCT concentration is <80% of peak or abs PCT <1.  Critical Value called to and read back  by  WILLIAM TEIXEIRA 0828 11/14/19 JS       CRP   CRP Inflammation   Date Value Ref Range Status   11/14/2019 32.6 (H) 0.0 - 8.0 mg/L Final       Culture Results:   Collected Updated Procedure Result Status    11/14/2019 1218 11/15/2019 0634 Active MRSA Surveillance Culture [[id number]]   Nares from Nasal Swab    Final result Specimen Description Nares   Culture Micro No MRSA isolated           11/14/2019 0242 11/18/2019 0718 Blood culture [[id number]]   Blood    Preliminary result Specimen Description Blood   Special Requests Left Hand P   Culture Micro No growth after 4 days P           11/14/2019 0231 11/18/2019 0718 Blood culture [[id number]]   Blood    Preliminary result Specimen Description Blood   Special Requests Right Arm   IV START   P   Culture Micro No growth after 4 days P                Recommendations/Interventions:  1. Lost IV access so doing Rocephin IM. No recommendations at this time. Will continue to monitor.     Breanna Bruce, [facility]  November 18, 2019

## 2019-11-19 NOTE — PROGRESS NOTES
[facility]    Hospitalist Progress Note      Assessment & Plan   Acute respiratory failure with hypoxia:   Severe, sats 70% on arrival to the ED due to underlying pneumonia. She was placed on Bipap there and then weaned off BiPAP on 11/14 to room air, then has developed a kind of waxing/waning course with now on 6 L NC and somewhat labored breathing. She is minimally responsive to me today. Continue to work to wean her O2     Pneumonia of both lungs due to infectious organism: Bacterial likely due to positive procalcitonin and response to antibiotics. May be due to aspiration event. Patient has lost IV access, so changed to rocephin IM yesterday.  Remains NPO after seen by speech path yesterday, impaired swallow function.  Will go back to parental antibiotics and change to Unasyn to cover for possible aspiration event. If no further improvement in next 24-48 hours, consider transition to comfort cares. Prognosis is very poor. Extremities cool, minimal responsiveness this morning.  Continue nebs, oxygen.     Respiratory acidosis: Due to above pneumonia with respiratory failure. Bilateral pneumonia-aspiration possible. pH 7.18 on arrival, improved to 7.25 on recheck.       Severe sepsis: Presented with lactic acidosis, metabolic encephalopathy, mild leukocytosis, tachycardia, fever, procal 12.98. She was given 1.5 liters of fluid-pressures improved, She was given dose of vanco and zosyn. She was transitioned to Rocephin and doxycycline as she has macrolid allergy.   Cannot rule out aspiration as initial cause though CXR does not have clear signs of acute aspiration event. Will hold off on steroids as she has no wheezing or history of COPD. Her condition is grave, she had prolonged period of hypoxia prior to arrival and severe acidosis on arrival. Grand-daughter would like to continue reasonable treatments for pneumonia but does not want treatment that would prolong her life if it does not appear she will  improve. Weaned off Bipap now-will not restart if she worsens, no pressors, no further sepsis work-up or intervention except daily labs, IVF, antibiotics. She is aware the next 24-48 hours will show if she is responding to treatment or worsening.  - currently on 6L of O2, mental status waxes and wanes     Hypokalemia: Due to no oral intake and IVF, will replace as needed    Severe malnutrition: Related to inadequate oral in take as evidenced by 24% weight loss in 1 year. Significant/severe loss of body fat and muscle mass. Management will depend of her course and response to treatment over next couple days     Bradycardia: Chronic, She has a history of syncope and pauses. In hospital 9/2019 due to syncope and fall that resulted in subdural hematoma. Evaluated by cardiology 10/19 and he recommended a pacemaker to prevent further incidences.     Essential hypertension: Pressures soft on arrival, received IVF for sepsis, now more stable. Hold home antihypertensive medications at this time.      Dementia without behavioral disturbance: Baseline confusion. Minimally responsive this morning     DVT Prophylaxis: Enoxaparin (Lovenox) subcutaneous     Code Status: DNR/DNI     Disposition: Expected discharge in several days once improved.  Per family, palliative care/hospice is an option if patient declines further    Sereen D. Sharp    Interval History   Lying in bed. Mildly labored breathing. Minimally opens eyes to voice and touch, but unable to stay awake/alert. Extremities cold and blue.     -Data reviewed today: I reviewed all new labs and imaging results over the last 24 hours. I personally reviewed no images or EKG's today.    Physical Exam   Temp: 97.3  F (36.3  C) Temp src: Tympanic BP: 126/67 Pulse: 93 Heart Rate: 110 Resp: 24 SpO2: 96 % O2 Device: Nasal cannula Oxygen Delivery: 5 LPM(turned down to 4lpm)  Vitals:    11/14/19 0630 11/15/19 0459   Weight: 45 kg (99 lb 3.3 oz) 45.7 kg (100 lb 12 oz)     Vital Signs  with Ranges  Temp:  [97.3  F (36.3  C)-98.5  F (36.9  C)] 97.3  F (36.3  C)  Pulse:  [93-99] 93  Heart Rate:  [102-116] 110  Resp:  [22-24] 24  BP: (123-167)/(67-98) 126/67  SpO2:  [92 %-100 %] 96 %  I/O last 3 completed shifts:  In: -   Out: 605 [Urine:605]    Peripheral IV 11/19/19 Right;Dorsal Hand (Active)   Site Assessment WDL 11/19/2019 12:00 PM   Line Status Saline locked 11/19/2019 12:00 PM   Phlebitis Scale 0-->no symptoms 11/19/2019 12:00 PM   Infiltration Scale 0 11/19/2019 12:00 PM   Number of days: 0       Urethral Catheter Latex 16 fr (Active)   Tube Description Positional 11/19/2019  7:52 AM   Catheter Care Done;Catheter wipes;Soap and water/perineal cleanser 11/19/2019  7:52 AM   Collection Container Standard 11/19/2019  7:52 AM   Securement Method Securing device (Describe) 11/19/2019  7:52 AM   Rationale for Continued Use Retention 11/18/2019 11:55 PM   Urine Output 200 mL 11/19/2019  6:33 AM   Number of days: 5       Pressure Injury 11/14/19 Posterior;Medial Coccyx Stage 1 (Active)   Wound Base Erythema, blanchable 11/19/2019  7:52 AM   Lakia-wound Assessment Blanchable erythema 11/19/2019  7:52 AM   Drainage Amount None 11/19/2019  7:52 AM   Wound Care/Cleansing Barrier applied  11/19/2019  7:52 AM   Dressing Open to air 11/19/2019  7:52 AM   Number of days: 5       Pressure Injury 11/14/19 Toe (Comment which one) (Active)   Wound Base White;Kilpatrick 11/19/2019  7:52 AM   Lakia-wound Assessment Blanchable erythema 11/19/2019  7:52 AM   Estimated Circumference ( if not measured pea sized 11/19/2019  7:52 AM   Drainage Amount None 11/19/2019  7:52 AM   Dressing Open to air 11/19/2019  7:52 AM   Number of days: 5     Line/device assessment(s) completed for medical necessity    Constitutional: Minimally responds with slightly opening eyes to voice and touch    HEENT: Normocephalic/atraumatic, mouth clear and dry     Cardiovascular: RRR. No murmur, no  rubs, or gallops.      Respiratory:  Course and  diminished breath sounds bilateral    Abdomen: Soft, nontender, nondistended, no organomegaly. Bowel sounds present    Extremities: Cool/dry. No edema    Neuro:  Non focal, cranial nerves intact, Moves all extremities.      Medications       ampicillin-sulbactam (UNASYN) IV  3 g Intravenous Q6H     aspirin  81 mg Oral Daily     brinzolamide-brimonidine  1 drop Right Eye TID     famotidine  20 mg Oral Daily     latanoprost  1 drop Right Eye At Bedtime     QUEtiapine  25 mg Oral At Bedtime     scopolamine  1 patch Transdermal Q72H    And     scopolamine   Transdermal Q8H    And     scopolamine   Transdermal Q72H     senna-docusate  1 tablet Oral Daily     sertraline  25 mg Oral Daily     timolol maleate  1 drop Right Eye BID       Data   Recent Labs   Lab 11/16/19  0550 11/15/19  1855 11/15/19  0450 11/14/19  0752 11/14/19  0231   WBC 14.7*  --  10.4 8.8 13.1*   HGB 15.1  --  11.8 13.2 13.9   MCV 90  --  87 90 89     --  157 175 263     --  147*  --  143   POTASSIUM 3.5 4.2 3.0*  --  3.4   CHLORIDE 114*  --  119*  --  114*   CO2 20  --  21  --  21   BUN 28  --  27  --  33*   CR 0.60  --  0.47*  --  0.61   ANIONGAP 10  --  7  --  8   KEILY 9.4  --  8.8  --  9.5   *  --  82  --  242*   ALBUMIN  --   --   --   --  3.2*   PROTTOTAL  --   --   --   --  7.0   BILITOTAL  --   --   --   --  0.6   ALKPHOS  --   --   --   --  66   ALT  --   --   --   --  26   AST  --   --   --   --  32   TROPI  --   --   --   --  <0.015       No results found for this or any previous visit (from the past 24 hour(s)).

## 2019-11-19 NOTE — PLAN OF CARE
Reason for hospital stay:  resp failure     Most recent vitals: /98 (BP Location: Right arm)   Pulse 90   Temp 98.5  F (36.9  C) (Tympanic)   Resp 24   Wt 45.7 kg (100 lb 12 oz)   SpO2 95%   BMI 16.26 kg/m      Pain Management:  denies     Orientation:  disoriented x4 - mumbling speech incomprehensible     Cardiac:  s1s2    Respiratory:  LS clear throughout     GI:  ABD concave soft nontender - BS hypoactive x4    :  abreu catheter in place patent - yellow clear void - 200 mL out this shift     Nutrition:  NPO - not swallowing     Skin Issues:  callus to right great toe grey, white and scaly no drainage - blanchable redness to coccyx     IVF:  no IV access     ABX:  Rocephin     Mobility:  gabriel lift     Safety:  bed lowest position and locked - room near unit station - alarms on bed - call light in reach     Face to face report given with opportunity to observe patient.    Report given to Esthela Longoria   11/19/2019  7:10 AM

## 2019-11-19 NOTE — PLAN OF CARE
Barely responding to staff today, pupils sluggish and unable to speak. Urine output 200ml for shift. Extremities becoming stiff, RLE very jessie and cool. Weaned to 4lpm of humidified nasal cannula. Did obtain IV access and ABX switched to unasyn q6hrs. VSS and remains afebrile.

## 2019-11-19 NOTE — PLAN OF CARE
Face to face report given with opportunity to observe patient.    Report given to HAMLET anderson RN   11/19/2019  3:26 PM

## 2019-11-19 NOTE — PROGRESS NOTES
"CLINICAL NUTRITION SERVICES  -  ASSESSMENT NOTE    Karey Paulson : LOS    89 yof admitted for acute respiratory failure with hypoxia. Pt has a hx of breast cancer, GERD, hypertension, diverticulosis and COPD. Noted pt's weight trending down over the past year. Intake has been poor this admission. Pt is lethargic. SLP evaluation recommended pt remain NPO as she unsafe to eat at this time. Noted palliative care/hospice may be pursued if pt's medical state declines. Depending on pt's/family's goals, may need to consider nutrition support.    Diet Order: NPO  Intake: A few bites of meals documented      Height: Unsure about height. Height documented on 5/13/19 was 5' 6\", in 2017- 5' 5\" and in 2015- 5' 2\"  Weight: 100 lbs 12 oz  Body mass index is 16.26 kg/m .  Weight Status:  Underweight BMI <18.5- if height is accurate  IBW: 59.4kg- for 5'6\"  Weight History: 24% (32lb) weight loss in 1 year (11/19/18 to 11/15/19)  Wt Readings from Last 10 Encounters:   11/15/19 45.7 kg (100 lb 12 oz)   10/30/19 45.8 kg (101 lb)   09/15/19 50.1 kg (110 lb 7.2 oz)   09/18/19 46.7 kg (103 lb)   08/05/19 50.3 kg (111 lb)   07/02/19 50.3 kg (111 lb)   05/30/19 51 kg (112 lb 6.4 oz)   05/13/19 51.7 kg (114 lb)   04/24/19 51.7 kg (114 lb)   03/20/19 11/19/18 54 kg (119 lb)  60.4 kg (132 lb 12.8 oz)     Calculated using current weight- 45.7kg  Estimated Energy Needs: 5475-1601 kcals (30-35 Kcal/Kg)   Estimated Protein Needs: 45-55 grams protein (1-1.2 g pro/Kg)    MALNUTRITION:  % Weight Loss:  > 20% in 1 year (severe malnutrition)  % Intake:  </= 75% for >/= 1 month (severe malnutrition)    Malnutrition Diagnosis: Severe malnutrition  In Context of:  Chronic illness or disease  Environmental or social circumstances      NUTRITION DIAGNOSIS:  Malnutrition related to inadequate oral in take as evidenced by 24% weight loss in 1 year    NUTRITION RECOMMENDATIONS  - Depending on pt's goal- consider nutrition support    MONITORING AND " EVALUATION:  RD will monitor diet order, intake, weight, labs

## 2019-11-19 NOTE — PLAN OF CARE
Awake/Alert early in shift becoming more somnolent t/o evening. VSS/AF with O2 99% 3L NC. Increased to 5L NC t/o shift to maintain sats >92%. Occasional incoherent soft speech. BLEs pale/cold. Dowling catheter in place for retention with 230 ml UOP this shift. No IV access. Remains NPO Reposition q 2 hours and PRN. Grand daughter at bedside for update, plan of care explained and is in agreement.   Face to face report given with opportunity to observe patient.    Report given to Veronica Jarvis RN   11/18/2019  11:27 PM

## 2019-11-19 NOTE — PHARMACY
[facility]    Pharmacy      Antimicrobial Stewardship Note     Current antimicrobial therapy:  Anti-infectives (From now, onward)    Start     Dose/Rate Route Frequency Ordered Stop    11/19/19 1100  ampicillin-sulbactam (UNASYN) 3 g in sodium chloride 0.9 % 100 mL intermittent infusion      3 g  over 1 Hours Intravenous EVERY 6 HOURS 11/19/19 1015            Indication: aspiration pneumonia, CAP    Days of Therapy: 1     Pertinent labs:  Creatinine   Creatinine   Date Value Ref Range Status   11/16/2019 0.60 0.52 - 1.04 mg/dL Final   11/15/2019 0.47 (L) 0.52 - 1.04 mg/dL Final   11/14/2019 0.61 0.52 - 1.04 mg/dL Final     WBC   WBC   Date Value Ref Range Status   11/16/2019 14.7 (H) 4.0 - 11.0 10e9/L Final   11/15/2019 10.4 4.0 - 11.0 10e9/L Final   11/14/2019 8.8 4.0 - 11.0 10e9/L Final     Procalcitonin   Procalcitonin   Date Value Ref Range Status   11/15/2019 18.95 (HH) ng/ml Final     Comment:     >/= 10.00 ng/ml   Very high likelihood of severe sepsis or septic shock.  Recommendation: Strongly recommend initiating or continuing antibiotics.   Evaluate culture results and clinical features to target antibacterial   therapy. Obtain blood cultures and other relevant cultures if not done.Repeat   PCT in 2 days to guide antibiotic de-escalation. Consider de-escalating   antibiotics when PCT concentration is <80% of peak or abs PCT <1.  Critical Value called to and read back by  WILLIAM TEIXEIRA AT 0529 ON 11/15/19 BY DIXIE.     11/14/2019 12.98 (HH) ng/ml Final     Comment:     >/= 10.00 ng/ml   Very high likelihood of severe sepsis or septic shock.  Recommendation: Strongly recommend initiating or continuing antibiotics.   Evaluate culture results and clinical features to target antibacterial   therapy. Obtain blood cultures and other relevant cultures if not done.Repeat   PCT in 2 days to guide antibiotic de-escalation. Consider de-escalating   antibiotics when PCT concentration is <80% of peak or abs  PCT <1.  Critical Value called to and read back by  WILLIAM TEIXEIRA 0828 11/14/19 JS       CRP   CRP Inflammation   Date Value Ref Range Status   11/14/2019 32.6 (H) 0.0 - 8.0 mg/L Final       Culture Results:   Collected Updated Procedure Result Status    11/14/2019 1218 11/15/2019 0634 Active MRSA Surveillance Culture [[id number]]   Nares from Nasal Swab    Final result Specimen Description Nares   Culture Micro No MRSA isolated           11/14/2019 0242 11/19/2019 0618 Blood culture [[id number]]   Blood    Preliminary result Specimen Description Blood   Special Requests Left Hand P   Culture Micro No growth after 5 days P           11/14/2019 0231 11/19/2019 0618 Blood culture [[id number]]   Blood    Preliminary result Specimen Description Blood   Special Requests Right Arm   IV START   P   Culture Micro No growth after 5 days P             Recommendations/Interventions:  1. None at this time    Breanna Bruce, [facility]  November 19, 2019

## 2019-11-20 NOTE — PLAN OF CARE
Discussed with granddaughter, Brianna, on family preference of giving morphine. They would like to give as a clear as-needed basis with very noticeable signs of pain or respiratory distress.

## 2019-11-20 NOTE — PLAN OF CARE
Here for acute respiratory failure. Somnolent & arousing to voice, but unable to verbally respond. Tachypnea in the 40s with soft BPs, but otherwise VSS. Satting 96% on 6 LPM w/ shallow breaths & abd/accessory muscles in use. LSs are clear, but diminished bilaterally. BLEs are dusky and cold w/ weak pedal pulses. BSs are hypoactive w/ pt remaining NPO. No output out of catheter. Blanchable redness noted to left hip & buttocks w/ barrier cream applied. Pressure injury to toe & coccyx. Both have no drainage and remain open to air. Turned q2 hrs. Unasyn hung x2. Bed in lowest position. Call light in reach. Fall band in place. Room near nurses station. Will continue to closely monitor.    Face to face report given with opportunity to observe patient.    Report given to Moody Ibarra   11/20/2019  7:30 AM

## 2019-11-20 NOTE — PLAN OF CARE
Somnolent with initial assessment rousing to name but unable to engage, VSS/AF O2 96% 5L NC RR 36-40.Dr Hoffman informed , comes to bedside to see patient. Orders received for morphine PRN pain control.She will notify family of decline. RR rate continues unchanged but has occasional increased HR 140s with O2 remaining mid 90s on 5L NC.Appears comfortable.  Dr Carmona informed of change. Family notified at 1815 and arrive at bedside approximately 1830. Periodically desats to 70s-80s then recovers to mid 90s; O2  now at 6L NC, No OUP this shift. BLEs remain pale cold.  Face to face report given with opportunity to observe patient.    Report given to Luis Jarvis RN   11/19/2019  11:49 PM

## 2019-11-20 NOTE — PHARMACY
[facility]    Pharmacy      Antimicrobial Stewardship Note     Current antimicrobial therapy:  Anti-infectives (From now, onward)    Start     Dose/Rate Route Frequency Ordered Stop    11/19/19 1100  ampicillin-sulbactam (UNASYN) 3 g in sodium chloride 0.9 % 100 mL intermittent infusion      3 g  over 1 Hours Intravenous EVERY 6 HOURS 11/19/19 1015          Indication: Aspiration Pneumonia, Community Acquired Pneumonia     Days of Therapy: 2     Pertinent labs:  Creatinine   Creatinine   Date Value Ref Range Status   11/16/2019 0.60 0.52 - 1.04 mg/dL Final   11/15/2019 0.47 (L) 0.52 - 1.04 mg/dL Final   11/14/2019 0.61 0.52 - 1.04 mg/dL Final     WBC   WBC   Date Value Ref Range Status   11/16/2019 14.7 (H) 4.0 - 11.0 10e9/L Final   11/15/2019 10.4 4.0 - 11.0 10e9/L Final   11/14/2019 8.8 4.0 - 11.0 10e9/L Final     Procalcitonin   Procalcitonin   Date Value Ref Range Status   11/15/2019 18.95 (HH) ng/ml Final     Comment:     >/= 10.00 ng/ml   Very high likelihood of severe sepsis or septic shock.  Recommendation: Strongly recommend initiating or continuing antibiotics.   Evaluate culture results and clinical features to target antibacterial   therapy. Obtain blood cultures and other relevant cultures if not done.Repeat   PCT in 2 days to guide antibiotic de-escalation. Consider de-escalating   antibiotics when PCT concentration is <80% of peak or abs PCT <1.  Critical Value called to and read back by  WILLIAM TEIXEIRA AT 0529 ON 11/15/19 BY DIXIE.     11/14/2019 12.98 (HH) ng/ml Final     Comment:     >/= 10.00 ng/ml   Very high likelihood of severe sepsis or septic shock.  Recommendation: Strongly recommend initiating or continuing antibiotics.   Evaluate culture results and clinical features to target antibacterial   therapy. Obtain blood cultures and other relevant cultures if not done.Repeat   PCT in 2 days to guide antibiotic de-escalation. Consider de-escalating   antibiotics when PCT concentration  is <80% of peak or abs PCT <1.  Critical Value called to and read back by  WILLIAM TEIXEIRA 0828 11/14/19 JS       CRP   CRP Inflammation   Date Value Ref Range Status   11/14/2019 32.6 (H) 0.0 - 8.0 mg/L Final       Culture Results:   Collected Updated Procedure Result Status    11/14/2019 1218 11/15/2019 0634 Active MRSA Surveillance Culture [[id number]]   Nares from Nasal Swab    Final result Specimen Description Nares   Culture Micro No MRSA isolated           11/14/2019 0242 11/20/2019 0615 Blood culture [[id number]]   Blood    Final result Specimen Description Blood   Special Requests Left Hand   Culture Micro No growth after 6 days           11/14/2019 0231 11/20/2019 0615 Blood culture [[id number]]   Blood    Final result Specimen Description Blood   Special Requests Right Arm   IV START    Culture Micro No growth after 6 days               Recommendations/Interventions:  1. Consider discontinuing antibiotics if patient hasn't shown improvement in the last 24-48 hours. Per provider, will consider switching to comfort cares if this is the case.     Breanna Bruce, [facility]  November 20, 2019

## 2019-11-20 NOTE — PLAN OF CARE
Grand jacky Arreola at bedside. Dr Hfofman has spoken to her and updated her of patient status. Elba wants to be notified of any decline.   [phone]

## 2019-11-20 NOTE — PLAN OF CARE
Telephone call placed to Elba grand daughter to inform of patients declining status. Family member states that she is on the way to the hospital at this time.

## 2019-11-20 NOTE — PROGRESS NOTES
[facility]    Hospitalist Progress Note      Assessment & Plan   Acute respiratory failure with hypoxia:   Severe, sats 70% on arrival to the ED due to underlying pneumonia. She was placed on Bipap there and then weaned off BiPAP on 11/14 to room air, then has developed a kind of waxing/waning course with now essentially remaining unresponsive. She is increasingly tachypneic with RR 30-40s now, although has weaned to 2L with sats remaining in 90s. Spoke with grand-daughter regarding grave prognosis and unlikely she will survive this hospitalization.   Grand-daughter indicates family is agreeable to moving to comfort care only.      Pneumonia of both lungs due to infectious organism: Bacterial likely due to positive procalcitonin and response to antibiotics. May be due to aspiration event. Patient lost IV access, so changed to rocephin IM and then to parental antibiotics with Unasyn (to cover for possible aspiration event) after IV access again established. She is on day 7 of antibiotics now and continues to show progressive decline, now essentially unresponsive with prognosis very poor. Extremities remain cool. Discontinue antibihotics and transition to comfort care. Anticipate death likely in next 24-48 hours.       Severe sepsis: Presented with lactic acidosis, metabolic encephalopathy, mild leukocytosis, tachycardia, fever, procal 12.98. She was given 1.5 liters of fluid-pressures improved, She was given dose of vanco and zosyn. She was transitioned to Rocephin and doxycycline as she has macrolid allergy.   Cannot rule out aspiration as initial cause though CXR does not have clear signs of acute aspiration event. Will hold off on steroids as she has no wheezing or history of COPD. Her condition is grave, she had prolonged period of hypoxia prior to arrival and severe acidosis on arrival. Now transitioning to comfort care     Hypokalemia: Due to no oral intake and IVF    Severe malnutrition: Related to  inadequate oral in take as evidenced by 24% weight loss in 1 year. Significant/severe loss of body fat and muscle mass.      Bradycardia: Chronic, She has a history of syncope and pauses. In hospital 9/2019 due to syncope and fall that resulted in subdural hematoma. Evaluated by cardiology 10/19 and he recommended a pacemaker to prevent further incidences.     Essential hypertension: No further management     Dementia without behavioral disturbance: Now essentially unresponsive as per above     DVT Prophylaxis: Enoxaparin (Lovenox) subcutaneous     Code Status: DNR/DNI     Disposition: Expected death in next 24-48 hours    Violet Hoffman    Interval History   Continues to have mildly labored breathing. Unresponsive for most part. At times will briefly open eyes. Extremities cold and blue.     -Data reviewed today: I reviewed all new labs and imaging results over the last 24 hours. I personally reviewed no images or EKG's today.    Physical Exam   Temp: 97.2  F (36.2  C) Temp src: Tympanic BP: 128/72   Heart Rate: 87 Resp: (!) 28 SpO2: 93 % O2 Device: Nasal cannula Oxygen Delivery: 2 LPM  Vitals:    11/14/19 0630 11/15/19 0459   Weight: 45 kg (99 lb 3.3 oz) 45.7 kg (100 lb 12 oz)     Vital Signs with Ranges  Temp:  [97.2  F (36.2  C)-98.6  F (37  C)] 97.2  F (36.2  C)  Heart Rate:  [] 87  Resp:  [28-44] 28  BP: ()/(45-72) 128/72  SpO2:  [68 %-99 %] 93 %  I/O last 3 completed shifts:  In: 106 [I.V.:106]  Out: 200 [Urine:200]    Peripheral IV 11/19/19 Right;Dorsal Hand (Active)   Site Assessment WDL 11/20/2019  8:38 AM   Line Status Saline locked 11/20/2019  8:38 AM   Phlebitis Scale 0-->no symptoms 11/20/2019  8:38 AM   Infiltration Scale 0 11/20/2019  8:38 AM   Extravasation? No 11/20/2019  8:38 AM   Number of days: 1       Urethral Catheter Latex 16 fr (Active)   Tube Description Positional 11/20/2019  6:00 AM   Catheter Care Done 11/19/2019 11:45 PM   Collection Container Standard 11/20/2019  6:00 AM    Securement Method Securing device (Describe) 11/20/2019  6:00 AM   Rationale for Continued Use Retention 11/20/2019  6:00 AM   Urine Output 0 mL 11/20/2019  6:00 AM   Number of days: 6       Pressure Injury 11/14/19 Posterior;Medial Coccyx Stage 1 (Active)   Wound Base Erythema, blanchable 11/19/2019 11:45 PM   Lakia-wound Assessment Blanchable erythema 11/19/2019 11:45 PM   Drainage Amount None 11/19/2019 11:45 PM   Wound Care/Cleansing Barrier applied  11/19/2019 11:45 PM   Dressing Open to air 11/19/2019 11:45 PM   Number of days: 6       Pressure Injury 11/14/19 Toe (Comment which one) (Active)   Wound Base White;Kilpatrick 11/19/2019 11:45 PM   Lakia-wound Assessment Blanchable erythema 11/19/2019 11:45 PM   Estimated Circumference ( if not measured pea sized 11/19/2019 11:45 PM   Drainage Amount None 11/19/2019 11:45 PM   Dressing Open to air 11/19/2019 11:45 PM   Number of days: 6     Line/device assessment(s) completed for medical necessity    Constitutional: Minimally responds with slightly opening eyes to voice and touch    HEENT: Normocephalic/atraumatic, mouth clear and dry     Cardiovascular: RRR. No murmur, no  rubs, or gallops.      Respiratory:  Course and diminished breath sounds bilateral    Abdomen: Soft, nontender, nondistended, no organomegaly. Bowel sounds present    Extremities: Cool/dry. No edema    Neuro:  Non focal, cranial nerves intact, Moves all extremities.      Medications         Data   Recent Labs   Lab 11/16/19  0550 11/15/19  1855 11/15/19  0450 11/14/19  0752 11/14/19  0231   WBC 14.7*  --  10.4 8.8 13.1*   HGB 15.1  --  11.8 13.2 13.9   MCV 90  --  87 90 89     --  157 175 263     --  147*  --  143   POTASSIUM 3.5 4.2 3.0*  --  3.4   CHLORIDE 114*  --  119*  --  114*   CO2 20  --  21  --  21   BUN 28  --  27  --  33*   CR 0.60  --  0.47*  --  0.61   ANIONGAP 10  --  7  --  8   KEILY 9.4  --  8.8  --  9.5   *  --  82  --  242*   ALBUMIN  --   --   --   --  3.2*    PROTTOTAL  --   --   --   --  7.0   BILITOTAL  --   --   --   --  0.6   ALKPHOS  --   --   --   --  66   ALT  --   --   --   --  26   AST  --   --   --   --  32   TROPI  --   --   --   --  <0.015       No results found for this or any previous visit (from the past 24 hour(s)).

## 2019-11-20 NOTE — PLAN OF CARE
Reason for hospital stay:  Acute Respiratory Failure with Hypoxia    Most recent vitals: /72 (BP Location: Left arm)   Pulse 93   Temp 97.2  F (36.2  C) (Tympanic)   Resp (!) 28   Wt 45.7 kg (100 lb 12 oz)   SpO2 93%   BMI 16.26 kg/m    Pain Management:  No visual or non-verbal symptoms of pain this shift. PAINAD scale used for asessment  Orientation:  Pt is aroused to voice, but unable to verbally respond  Cardiac:  S1 and S2 sounds present  Respiratory:  LS diminisshed in all lobes bilaterally. HIGH RR of 44. Morphine x2. O2 decresed from 6 l/min to 2 l/min  GI:  Bowel tones hypoactive x4 quads  :  48 mLs clear yellow urine all shift  Diet: NPO  Skin Issues:  Blanchable redness noted to L hip and butt. Pressure injury to toe and coccyx  IVF:  SL  ABX:  Unasyn x1 (now discontinued)  Mobility:  Bedfast, turn and repo q2h  Safety:  Bed in lowest position, call light within reach    Comments: Comfort Cares now ordered    11/20/2019  2:59 PM  Marcel Squires RN     Face to face report given with opportunity to observe patient.  Report given to HAMLET Luna.    Marcel Squires RN  11/20/2019, 3:12 PM

## 2019-11-21 NOTE — PLAN OF CARE
Here for acute respiratory failure w/ comfort cares. Tachypnea in the high 30s. Breathing is shallow with abd/acessory muscle use. LSs are dim w/ rhonchi in upper lobes. 1 mg of morphine given x2. Pt is somnolent & minimally arouses to voice. Will make occasional incomprehensible sounds, but does appear to be calm with no obvious signs of discomfort or distress. Extremities are dusky & pale, especially in the lower right limb. Pulses are weak throughout and BSs are hypoactive. Cath in place with total output of 50 mLs. Blanchable redness noted to coccyx w/ barrier cream applied. Pressure injury to left big toe is gray/white, very dry, and left open to air. Bed in lowest position. Call light in reach. Room near nurses station. Turned q2 hrs. Will continue to frequently monitor for signs of discomfort/distress.    Face to face report given with opportunity to observe patient.    Report given to Veronica Ibarra   11/21/2019  7:15 AM

## 2019-11-21 NOTE — PLAN OF CARE
Reason for hospital stay:  comfort cares     Most recent vitals: BP (!) 86/46 (BP Location: Right arm)   Pulse 101   Temp (!) 102  F (38.9  C) (Tympanic)   Resp (!) 36   Wt 45.7 kg (100 lb 12 oz)   SpO2 (!) 85%   BMI 16.26 kg/m      Pain Management:  morphine IV as needed for restlessness     Orientation:  patient not verbal and somnolent     Cardiac:  s1s2    Respiratory:  tachy shallow     GI:  ABD concave soft - BS hypoactive     :  catheter abreu in place chantal clear void 150 mL out for shift    Nutrition:  NPO     Skin Issues:  right great toe callus grey and white dry - coccyx blanchable reddness - turn q2h     IVF:  none     ABX:  none    Mobility:  gabriel - bedrest     Safety:  near unit station - bed locked in lowest position     Face to face report given with opportunity to observe patient.    Report given to Sarah Longoria   11/21/2019  3:44 PM

## 2019-11-21 NOTE — PLAN OF CARE
Reason for hospital stay: Acute respiratory failure with hypoxia    Most recent vitals: /55   Pulse 93   Temp 97.2  F (36.2  C) (Tympanic)   Resp (!) 36   Wt 45.7 kg (100 lb 12 oz)   SpO2 93%   BMI 16.26 kg/m      Pain Management:  Pain scale score was anywhere from 0-3. Patient was resting comfortably the entire shift, family stated they did not want morphine given if patient appeared comfortable.     Orientation:  Unable to assess. Patient occasionally makes incomprehensible sounds but no eye contact or verbal. Sleeps most of the time.     Cardiac:  Tachycardic     Respiratory:  Tachypnea, shallow breaths. Rhonchi and very diminished. Remains on 2L for comfort.     GI:  Hypoactive     :  Dowling remains in place with adequate output compared to dayshift.     Nutrition:  NPO. Oral cares performed q2-4 hrs per order.     Skin Issues:  Blanchable redness on buttocks, pressure injuries on bilateral greater toes. Gray/white in appearance, no drainage. Scattered bruising. Mottling present on bilateral legs from calves to feet. Cool to the touch.     IVF:  Saline locked    ABX:  None    Mobility:  Bedrest. Turned and repo every 2hr    Safety:  WDL, room near unit station, frequent rounding     Face to face report given with opportunity to observe patient.    Report given to HAMLET Smith   11/20/2019  11:09 PM

## 2019-11-21 NOTE — PLAN OF CARE
Face to face report given with opportunity to observe patient.    Report given to HAMLET Workman   11/20/2019  8:27 PM

## 2019-11-22 NOTE — PLAN OF CARE
1800- Pt appears comfortable, unresponsive. Turned and repositioned, oral cares completed and tylenol suppository given for T 101.5. RR 32 and shallow. motteling to BLE's.   184- Family at bedside with pt. 185- family alerted nurse that they felt pt had stopped breathing. Confirmed with stethoscope pulse and respirations ceased. Dr. Hoffman and Dr. Carmona notified. Family remains at bedside, support provided and bereavement folder given. Family wishes that watch be left on and dentures sent with  home.  Charge nurse and Supervisor notified.

## 2019-11-24 NOTE — DISCHARGE SUMMARY
[facility]    Death Summary  Hospitalist    Date of Admission:  2019  Date and time of death:  2019  6:55 PM  Discharging Provider: Violet Hoffman  Date of Service (when I saw the patient): 19    Diagnoses at time of death  Acute respiratory failure with hypoxia  Pneumonia of both lungs due to infectious organism, likely bacterial  Possible aspiration pneumonia  Severe sepsis  Bradycardia  Essential hypertension  Hypokalemia  Severe malnutrition  Dementia    Death Summary  Presented with acute hypoxic respiratory failure with sats 70% on arrival to ED, lactic acidosis, metabolic encephalopathy, mild leukocytosis, tachycardia, fever, procal 12.98 consistent with severe sepsis. Impression was for underlying bacterial pneumonia or possible aspiration event. She was given 1.5 liters of fluid-pressures improved, She was given dose of vanco and zosyn. She was transitioned to Rocephin and doxycycline as she has macrolid allergy. She had prolonged period of hypoxia prior to arrival and severe acidosis on arrival, with very guarded prognosis for survival. Decision was made by family to try some conservative treatment to see if she would respond. Initially did show some improvement and able to transition of of BiPAP to 6 L nasal canula. However, has since developed waxing and waning course. Lost IV access, so was changed to IM Rocephin for a day. Was placed back on IV Unasyn once IV access re-established, to cover for possible aspiration event as well, but has shown continued decline since then. Although she was able to wean to 2L, she was increasingly less responsive and increasingly tachypneic.  Extremities were persistently cold/blue.  Decision was made yesterday to transition to comfort care. Upon seeing her this morning, was noted that she did have some mottling of extremities, remaining unresponsive and tachypneic. Imminent death was expected. She then later  at 1855

## 2021-07-20 NOTE — PROGRESS NOTES
Patient Information     Patient Name  Karey Paulson MRN  [id number] Sex  Female   10/17/1924      Letter by Byron Huerta MD at      Author:  Byron Huerta MD Service:  (none) Author Type:  (none)    Filed:   Encounter Date:  2017 Status:  (Other)           Karey Paulson  [id number]  [address] [facility] [id number]          2017    Dear Ms. Paulson:    Enclosed is a copy of your laboratory testing which did come back satisfactory. Please call if you should have any questions.  Results for orders placed or performed in visit on 17       COMP METABOLIC PANEL       Result  Value Ref Range Status    SODIUM 137 133 - 143 mmol/L Final    POTASSIUM 4.3 3.5 - 5.1 mmol/L Final    CHLORIDE 103 98 - 107 mmol/L Final    CO2,TOTAL 26 21 - 31 mmol/L Final    ANION GAP 8 5 - 18                 Final    GLUCOSE 109 (H) 70 - 105 mg/dL Final    CALCIUM 10.0 8.6 - 10.3 mg/dL Final    BUN 18 7 - 25 mg/dL Final    CREATININE 0.63 (L) 0.70 - 1.30 mg/dL Final    BUN/CREAT RATIO           29                 Final    GFR if African American >60 >60 ml/min/1.73m2 Final    GFR if not African American >60 >60 ml/min/1.73m2 Final    ALBUMIN 3.8 3.5 - 5.7 g/dL Final    PROTEIN,TOTAL 7.3 6.4 - 8.9 g/dL Final    GLOBULIN                  3.5 2.0 - 3.7 g/dL Final    A/G RATIO 1.1 1.0 - 2.0                 Final    BILIRUBIN,TOTAL 0.6 0.3 - 1.0 mg/dL Final    ALK PHOSPHATASE 54 34 - 104 IU/L Final    ALT (SGPT) 14 7 - 52 IU/L Final    AST (SGOT) 16 13 - 39 IU/L Final   CBC WITH AUTO DIFFERENTIAL       Result  Value Ref Range Status    WHITE BLOOD COUNT         8.4 4.5 - 11.0 thou/cu mm Final    RED BLOOD COUNT           5.18 4.00 - 5.20 mil/cu mm Final    HEMOGLOBIN                14.4 12.0 - 16.0 g/dL Final    HEMATOCRIT                45.1 33.0 - 51.0 % Final    MCV                       87 80 - 100 fL Final    MCH                       27.7 26.0 - 34.0 pg Final    MCHC                      31.9 (L) 32.0  - 36.0 g/dL Final    RDW                       14.0 11.5 - 15.5 % Final    PLATELET COUNT            308 140 - 440 thou/cu mm Final    MPV                       7.1 6.5 - 11.0 fL Final    NEUTROPHILS               67.4 42.0 - 72.0 % Final    LYMPHOCYTES               19.3 (L) 20.0 - 44.0 % Final    MONOCYTES                 8.8 <12.0 % Final    EOSINOPHILS               3.6 <8.0 % Final    BASOPHILS                 1.0 <3.0 % Final    ABSOLUTE NEUTROPHILS      5.7 1.7 - 7.0 thou/cu mm Final    ABSOLUTE LYMPHOCYTES      1.6 0.9 - 2.9 thou/cu mm Final    ABSOLUTE MONOCYTES        0.7 <0.9 thou/cu mm Final    ABSOLUTE EOSINOPHILS      0.3 <0.5 thou/cu mm Final    ABSOLUTE BASOPHILS        0.1 <0.3 thou/cu mm Final     Byron Huerta MD ....................  2/27/2017   4:24 PM           No

## 2022-10-31 NOTE — ED TRIAGE NOTES
EMS Arrival Note  ________________________________  Karey Paulson is a 89 year old Female that arrives via [facility] ambulance service from assisted living [facility]  Pre hospital clinical presentation per patient  and EMS personnel includes pt arrives with emesis on her shirt, diarrhea leaking through her pants, EMS reports she was like that when they picked her up.  Pt has had N,V & diarrhea since waking up today, reports she feels really weak and her stomach hurts right now.  Pt given a bed bath, clothing placed in belongings bag.  Pre hospital personnel report vital signs of:  B/P 120/59; HR 72; SpO2 90% RA  Pre Hospital Cardiac rhythm reported as Normal Sinus  Pre hospital care included: Medication: Zofran and NS:,   Patient arrives with:  GCS Total = 15  Airway intact  Breathing Assessment Normal.    Circulation Assessment Normal.  Patient arrives with a 22 gauge IV at her right hand with 25 ml of normal saline infused upon arrival.    Placed in room 903, gowned, warm blanket provided, side rails up,  ID verified and band placed, and call light within reach.       Previous living situation Nursing Home [facility]  
You can access the FollowMyHealth Patient Portal offered by Health system by registering at the following website: [email or web address]. By joining [employer]’s FollowMyHealth portal, you will also be able to view your health information using other applications (apps) compatible with our system.

## 2023-11-22 NOTE — PROGRESS NOTES
:    Met with patient to discuss SNF and home care options. Patient declined both at this time. She reports that staff at [facility] do exercises with her already. Patient was unsure how she will get home at discharge. She reports that she will need her wheelchair and clothes. She stated that her granddaughter Elba could be called for an update.     Called Katerina Posadas LPN at [facility] and provided discharge update. Anticipated discharge tomorrow. Katerina expressed concern about patient returning to their facility tomorrow, as they do not have an RN scheduled to work tomorrow. Informed Katerina that patient will be discharged when medically ready to leave the hospital, which the MD anticipates will be tomorrow. Katerina seemed agreeable to possible discharge tomorrow. Per Katerina, patient uses [facility]. [facility] will need to  patient's wheelchair prior to picking up patient. Katerina confirmed that patient does get exercises every day from [facility] staff.     Attempted to call patient's granddaughter Elba. A child answered the phone and hung up.      will continue to follow.     CATHERINE Beavers on 12/17/2018 at 1:21 PM     NEUROLOGY FOLLOW-UP - 11/22/2023     REASON FOR VISIT: Fredy Zavala 64 y.o. male presents today for follow-up.     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  Patient has been transitioning onto Xcopri and is currently taking 400 mg nightly, the maximum dose.  Any attempts to wean him off one or more of his other medications have resulted in breakthrough seizures (focal unaware seizures and most recently a few weeks ago focal to bilateral tonic-clonic seizure prolonged lasting several minutes with several hours of postictal confusion).  Because of the difficulty of making medication changes in an outpatient basis and because of his recent prolonged focal to bilateral tonic-clonic seizure which poses a serious risk to his health, he warrants inpatient admission while medication adjustments are made with very close supervision.  Therefore, inpatient admission is a medical necessity as medications are optimized.      Patient feels more sedated since increasing Xcopri to 400 mg QHS and since adding back and increasing vimpat for further antiseizure protection. He has had only 1 focal to bilateral tonic clonic seizure lasting several minutes and with post-ictal confusion lasting hours > 2 month ago in the setting of medication weaning which prompted admission to the EMU this week. If patient is unable wean off his medications and/or continues to have seizures, then we will discuss getting updated MRI Brain w/wo contrast epilepsy protocol and referring to epilepsy level 4 center with consideration for phase II monitoring for epilepsy surgery. Since our last visit in August he has had a couple focal unaware seizures but no  big FBLTC seizure.           Antiseizure regimen upon admisson:  Xcopri 400 mg QHD  Vimpat 100 mg BID  Aptiom 400 mg QHS  Clonazepam 0.25 mg QHS     DIAGNOSTICS  MRI Brain August 2015:  FINDINGS:     There is again seen extraaxial mass effect in the left   supraclinoid region.  There is again seen inferior impression on the left   hypothalamus and subfrontal region.  This mass appears to measure less   than 1 cm in craniocaudad dimension.  This also measures just over 1 cm   in transverse dimension.  This is characterized by hypointense signal in   all sequences with no evidence of enhancement.  The overall size and   signal characteristics are stable from comparison.  There is some   uplifting of the left supraclinoid carotid artery as well as the middle   cerebral artery branches, stable from comparison. There is no evidence of   midline shift or focal edema.  No evidence of abnormal diffusion-weighted   signal.  The CSF spaces are otherwise normal in caliber.  No extraaxial   fluid collections are seen.  There is again noted increased signal within   the medial temporal lobes bilaterally which is stable from comparison.     There is normal flow void seen within the visualized intracranial   vascular structures.  Bilateral maxillary sinus disease is again seen.     Ethmoid and sphenoid sinus disease is seen as well.        Impression  IMPRESSION:     1. 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  THE OVERALL SIZE AND SIGNAL   CHARACTERISTICS APPEAR STABLE FROM COMPARISON AND DIFFERENTIAL REMAINS AS   PREVIOUSLY DISCUSSED.       2. INCREASED FLAIR SIGNAL SEEN WITHIN THE MESIAL TEMPORAL LOBES WHICH CAN   BE SEEN IN THE SETTING OF MESIAL TEMPORAL SCLEROSIS.  THIS IS UNCHANGED   FROM COMPARISON.       3. MAXILLARY, ETHMOID, AND SPHENOID SINUS DISEASE.        EEG March 2022 most recent:  INTERPRETATION:   Abnormal video EEG recording in the awake and  drowsy state(s):  -Intermittent left frontotemporal theta>delta slowing, suggestive of focal dysfunction and/or structural abnormality in this region. Clinical and radiographic correlation recommended.  - No epileptiform discharges seen   - No seizures. Clinical correlation is recommended.  INTERVAL HISTORY:      Patient was admitted to the EMU in early oct 2023 for close supervised withdrawal of his antiseizure medications given the focal to b/l TC seizure he had with efforts to taper in the outpatient setting. No issues with the tapering process. No seizures during admission. His EEG was notable for the following:    Occasional to frequent sleep activated left anterior inferior temporal maximal ( F9T9>F7T3) sharp and slow wave discharges seen while awake, rarely occurring in brief periodic or quasi-periodic 1-1.5 Hz runs. These findings are suggestive of an increased risk for seizures arising from this region and are consistent with prior EEGs.  -rare to occasional bilateral right>left centro-parietal and midline parietal sharply contoured slowing, often shifting in laterality and occurring in brief 1-2 Hz qausi-rhythmic>rhythmic runs with overriding spiky beta and/or midline and left>right parietal maximal low voltage  spikes, at times suspiciously epileptiform . These findings suggest nonspecific focal dysfunction in this region as well as a potential independent deep centro-parietal mesial epileptgoenic focus. Clinical and radiographic correlation recommended. Compared to prior EEGs, this findings is a new finding not previously seen or reported on and the additional inferior temporal electrodes added may account for this new finding. Further monitoring is needed to clarify its significance.   - No definitive seizures.   -No clinical events reported or recorded    Patient referred to ENT for surgical considerations as alternative treatments for sleep apnea -     Patient was slowly tapered of clonazepam at EMU  discharge. Aptiom and vimpat were stopped as well. He was discharged on Xcopri 400 mg QHS.    Patient returns with his wife who assist in private providing details of his interval history.  They have both been keeping track of having seizures he has been having.  Since discharge from the epilepsy monitoring unit in early October he is averaged about 1-2 seizures per week lasting approximately 45 to 60 seconds at most.  No seizures were focal to bilateral tonic-clonic.  There were typical brief focal unaware seizures.  He largely had no recollection of the seizures per usual.  He has had about 4 seizures in November.  The trends in the past 2 months do not suggest that they are increasing.  He also tapered off low-dose clonazepam.  Reports feeling significantly better being off all his medications except for the Xcopri.  Cognitively he is much more sharp, short-term memory is significantly improved, and he has a lot more energy.  He has lost a couple pounds which may be secondary to being on fewer medications.  His wife concurs with these beneficial observations on being few medications.     CURRENT MEDICATIONS AT THE TIME OF THIS ENCOUNTER:  Current Outpatient Medications on File Prior to Visit   Medication Sig Dispense Refill    Cenobamate (XCOPRI) 200 MG Tab Take 2 tablets (400 mg) by mouth at bedtime 60 Tablet 2    terbinafine (LAMISIL) 1 % cream Apply to affected areas on feet nightly 30 g 3    atorvastatin (LIPITOR) 20 MG Tab Take 1 tablet by mouth every evening. 90 Tablet 3    fluticasone-umeclidinium-vilanterol (TRELEGY ELLIPTA) 200-62.5-25 mcg/act inhaler Inhale 1 puff every day. 60 Each 6    Cholecalciferol (VITAMIN D) 2000 UNIT Tab Take 2,000 Units by mouth every day.      albuterol 108 (90 Base) MCG/ACT Aero Soln inhalation aerosol USE 2 INHALATIONS EVERY 6 HOURS AS NEEDED FOR SHORTNESS OF BREATH 8.5 g 10     No current facility-administered medications on file prior to visit.        EXAM:   /64 (BP  "Location: Right arm, Patient Position: Sitting, BP Cuff Size: Adult)   Pulse 80   Temp 36.2 °C (97.1 °F) (Temporal)   Ht 1.727 m (5' 8\")   Wt 74.8 kg (164 lb 14.5 oz)   SpO2 98%                 Physical Exam:  Physical Exam  Constitutional:       General: He is not in acute distress.  HENT:      Head: Normocephalic.      Nose: Nose normal.      Mouth/Throat:      Pharynx: Uvula swelling present.      Comments: Enlarged tongue and marked enlarged and long uvula with minor swelling  Cardiovascular:      Rate and Rhythm: Normal rate.   Neurological:      Coordination: Romberg sign negative.      Deep Tendon Reflexes:      Reflex Scores:       Tricep reflexes are 2+ on the right side and 2+ on the left side.       Bicep reflexes are 2+ on the right side and 2+ on the left side.       Brachioradialis reflexes are 2+ on the right side and 2+ on the left side.       Patellar reflexes are 3+ on the right side and 3+ on the left side.       Achilles reflexes are 2+ on the right side and 2+ on the left side.  Psychiatric:         Speech: Speech normal.        Neurological Exam   Neurological Exam  Mental Status   Oriented only to person, place, time and situation. Recalls 2 of 3 objects immediately. At 3 minutes recalls 2 of 3 objects. Recalls 1 of 3 objects with prompting. Speech is normal. Able to name objects. Follows complex commands. Digit span was 5 forward Fund of knowledge is appropriate for level of education.  Able to name months of years forward and backwards, complex sentence reprtiytion intact, abstract thinking and understanding intact. Speech and thinking is slower and more hesitant than usual.    Motor   No fasciculations present. Normal muscle tone. No abnormal involuntary movements. No pronator drift.    Sensory  Light touch is normal in upper and lower extremities.     Reflexes                                            Right                      Left  Brachioradialis                    2+              "            2+  Biceps                                 2+                         2+  Triceps                                2+                         2+  Patellar                                3+                         3+  Achilles                                2+                         2+    Coordination  Right: Finger-to-nose normal. Rapid alternating movement normal.Left: Finger-to-nose normal. Rapid alternating movement normal.  -finger.    Gait  Casual gait is normal including stance, stride, and arm swing.Normal toe walking. Normal heel walking. Tandem gait abnormality: Romberg is absent.       PERTINENT NOTES AND DIAGNOSTICS DATA PERSONALLY REVIEW:  MRI Brain Aug 2005  1. 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  THE OVERALL SIZE AND SIGNAL   CHARACTERISTICS APPEAR STABLE FROM COMPARISON AND DIFFERENTIAL REMAINS AS   PREVIOUSLY DISCUSSED.    2. INCREASED FLAIR SIGNAL SEEN WITHIN THE MESIAL TEMPORAL LOBES WHICH CAN   BE SEEN IN THE SETTING OF MESIAL TEMPORAL SCLEROSIS.  THIS IS UNCHANGED   FROM COMPARISON.    3. MAXILLARY, ETHMOID, AND SPHENOID SINUS DISEASE.         ASSESSMENT, EDUCATION, AND COUNSELING:  This is a 64 y.o. male patient who presents to the neurology clinic. We had an extensive discussion about the patient's symptoms, signs, and work-up to date, if any. We discussed potential and/or definitive diagnoses, work-up, and potential treatments.       PLAN:  If applicable, the work-up such as labs, imaging, procedures, and/or other testing, referrals, and/or recommended treatment strategies are listed below.    Visit Diagnoses     ICD-10-CM   1. Intractable focal epilepsy with impairment of consciousness ([facility])  G40.219   2. 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 Chronic obstructive pulmonary disease, unspecified COPD type ([facility])   J44.9   6. Hypertrophy of uvula  K13.79   7. Sleep apnea, unspecified type  G47.30   8. Excessive daytime sleepiness  G47.19   9. History of prostate cancer  Z85.46   10. Abnormal brain MRI  R90.89        Orders Placed This Encounter    MR-BRAIN-WITH & W/O      Patient with intractable focal epilepsy left temporal onset.  Did recommend that he get updated MRI brain epilepsy protocol especially in light of some more recent EEG abnormalities that emerged in the last few hours of his recording while in the epilepsy monitoring unit.  Specifically, there was some rhythmic slowing over the right temporoparietal region which may indicate an independent epileptogenic focus.  On review of his old MRI back in August 2005 he does have bilateral mesial temporal hyperintensities left worse than right suggesting of potential left and right mesial temporal sclerosis.  He also has some abnormal architecture and signaling of the hyperal thalamic region that was a bit atypical.  Recommend that he had an updated MRI brain and so I have ordered MRI brain epilepsy protocol.  We had an extensive discussion about neck steps in management as well.  Ultimately we discussed making no changes at the moment and continuing to monitor and journaling his seizures.  He would prefer to continue this regimen as he feels significantly better on Xcopri monotherapy.  His quality life is improved.  We discussed options such as resuming clonazepam and/or adding Briviact which can complement Xcopri.  We also discussed moving towards epilepsy surgery work-up.  Ultimately continued monitoring is what he wants to do for now.  He or his wife will let me know if there is any concerns, specifically if he has any focal to bilateral tonic-clonic seizures or if there is any sort of trend of increased frequency and/or duration of his seizures.  Otherwise we will follow-up in 3 to 4 months at which time we will get updated blood work as well.    BILLING  DOCUMENTATION:     I spent a total of I spent a total of 40 minutes on the day of the visit.   of face-to-face time in this visit. Over 50% of the time of the visit today was spent on counseling and/or coordination of care wtih the patient and/or family, as above in assessment in plan.    Fredy Franklin MD  Department of Neurology at [facility]  Diplomate of the American Board of Psychiatry and Neurology, General Neurology  Diplomate of American Board of Psychiatry and Neurology, a Member Board of the American Board of Medical Subspecialties, Epilepsy  Director of [facility] Level III Comprehensive Epilepsy Program  Professor of Clinical Neurology, [facility].   [address] [facility], [place], [address]  [facility] [id number] [phone]   Fax: [phone]  E-mail: [email or web address]

## (undated) RX ORDER — ONDANSETRON 2 MG/ML
INJECTION INTRAMUSCULAR; INTRAVENOUS
Status: DISPENSED
Start: 2018-01-01

## (undated) RX ORDER — ONDANSETRON 2 MG/ML
INJECTION INTRAMUSCULAR; INTRAVENOUS
Status: DISPENSED
Start: 2018-11-05

## (undated) RX ORDER — POTASSIUM CHLORIDE 1500 MG/1
TABLET, EXTENDED RELEASE ORAL
Status: DISPENSED
Start: 2018-01-01

## (undated) RX ORDER — ALBUTEROL SULFATE 0.83 MG/ML
SOLUTION RESPIRATORY (INHALATION)
Status: DISPENSED
Start: 2018-01-01

## (undated) RX ORDER — SODIUM CHLORIDE 9 MG/ML
INJECTION, SOLUTION INTRAVENOUS
Status: DISPENSED
Start: 2018-01-01

## (undated) RX ORDER — SODIUM CHLORIDE 9 MG/ML
INJECTION, SOLUTION INTRAVENOUS
Status: DISPENSED
Start: 2018-11-05